# Patient Record
Sex: FEMALE | Race: WHITE | Employment: OTHER | URBAN - NONMETROPOLITAN AREA
[De-identification: names, ages, dates, MRNs, and addresses within clinical notes are randomized per-mention and may not be internally consistent; named-entity substitution may affect disease eponyms.]

---

## 2017-08-05 ENCOUNTER — HOSPITAL ENCOUNTER (EMERGENCY)
Age: 67
Discharge: HOME | End: 2017-08-05
Attending: EMERGENCY MEDICINE
Payer: MEDICARE

## 2017-08-05 ENCOUNTER — APPOINTMENT (OUTPATIENT)
Dept: CT IMAGING | Age: 67
End: 2017-08-05
Payer: MEDICARE

## 2017-08-05 ENCOUNTER — APPOINTMENT (OUTPATIENT)
Dept: GENERAL RADIOLOGY | Age: 67
End: 2017-08-05
Payer: MEDICARE

## 2017-08-05 VITALS
RESPIRATION RATE: 20 BRPM | WEIGHT: 271 LBS | DIASTOLIC BLOOD PRESSURE: 78 MMHG | OXYGEN SATURATION: 93 % | HEART RATE: 83 BPM | TEMPERATURE: 98.5 F | SYSTOLIC BLOOD PRESSURE: 167 MMHG | HEIGHT: 60 IN | BODY MASS INDEX: 53.2 KG/M2

## 2017-08-05 DIAGNOSIS — N10 ACUTE PYELONEPHRITIS: Primary | ICD-10-CM

## 2017-08-05 LAB
-: ABNORMAL
ABSOLUTE EOS #: 0.1 K/UL (ref 0–0.4)
ABSOLUTE LYMPH #: 2 K/UL (ref 1–4.8)
ABSOLUTE MONO #: 0.4 K/UL (ref 0–1)
ALBUMIN SERPL-MCNC: 3.8 G/DL (ref 3.5–5.2)
ALBUMIN/GLOBULIN RATIO: 1 (ref 1–2.5)
ALP BLD-CCNC: 162 U/L (ref 35–104)
ALT SERPL-CCNC: 32 U/L (ref 5–33)
AMORPHOUS: ABNORMAL
ANION GAP SERPL CALCULATED.3IONS-SCNC: 16 MMOL/L (ref 9–17)
AST SERPL-CCNC: 29 U/L
BACTERIA: ABNORMAL
BASOPHILS # BLD: 1 %
BASOPHILS ABSOLUTE: 0.1 K/UL (ref 0–0.2)
BILIRUB SERPL-MCNC: 0.38 MG/DL (ref 0.3–1.2)
BILIRUBIN URINE: NEGATIVE
BUN BLDV-MCNC: 25 MG/DL (ref 8–23)
BUN/CREAT BLD: 28 (ref 9–20)
CALCIUM SERPL-MCNC: 8.9 MG/DL (ref 8.6–10.4)
CASTS UA: ABNORMAL /LPF
CHLORIDE BLD-SCNC: 94 MMOL/L (ref 98–107)
CO2: 23 MMOL/L (ref 20–31)
COLOR: YELLOW
COMMENT UA: ABNORMAL
CREAT SERPL-MCNC: 0.9 MG/DL (ref 0.5–0.9)
CRYSTALS, UA: ABNORMAL /HPF
DIFFERENTIAL TYPE: ABNORMAL
EOSINOPHILS RELATIVE PERCENT: 2 %
EPITHELIAL CELLS UA: ABNORMAL /HPF (ref 0–25)
GFR AFRICAN AMERICAN: >60 ML/MIN
GFR NON-AFRICAN AMERICAN: >60 ML/MIN
GFR SERPL CREATININE-BSD FRML MDRD: ABNORMAL ML/MIN/{1.73_M2}
GFR SERPL CREATININE-BSD FRML MDRD: ABNORMAL ML/MIN/{1.73_M2}
GLUCOSE BLD-MCNC: 288 MG/DL (ref 70–99)
GLUCOSE URINE: ABNORMAL
HCT VFR BLD CALC: 35.2 % (ref 36–46)
HEMOGLOBIN: 11.9 G/DL (ref 12–16)
KETONES, URINE: NEGATIVE
LEUKOCYTE ESTERASE, URINE: ABNORMAL
LIPASE: 55 U/L (ref 13–60)
LYMPHOCYTES # BLD: 35 %
MCH RBC QN AUTO: 27.7 PG (ref 26–34)
MCHC RBC AUTO-ENTMCNC: 33.8 G/DL (ref 31–37)
MCV RBC AUTO: 82 FL (ref 80–100)
MONOCYTES # BLD: 7 %
MUCUS: ABNORMAL
NITRITE, URINE: POSITIVE
OTHER OBSERVATIONS UA: ABNORMAL
PDW BLD-RTO: 15.9 % (ref 12.1–15.2)
PH UA: 5.5 (ref 5–9)
PLATELET # BLD: 141 K/UL (ref 140–450)
PLATELET ESTIMATE: ABNORMAL
PMV BLD AUTO: 9.7 FL (ref 6–12)
POTASSIUM SERPL-SCNC: 3.9 MMOL/L (ref 3.7–5.3)
PROTEIN UA: NEGATIVE
RBC # BLD: 4.29 M/UL (ref 4–5.2)
RBC # BLD: ABNORMAL 10*6/UL
RBC UA: ABNORMAL /HPF (ref 0–2)
RENAL EPITHELIAL, UA: ABNORMAL /HPF
SEG NEUTROPHILS: 55 %
SEGMENTED NEUTROPHILS ABSOLUTE COUNT: 3.2 K/UL (ref 1.8–7.7)
SODIUM BLD-SCNC: 133 MMOL/L (ref 135–144)
SPECIFIC GRAVITY UA: 1.01 (ref 1.01–1.02)
TOTAL PROTEIN: 7.7 G/DL (ref 6.4–8.3)
TRICHOMONAS: ABNORMAL
TURBIDITY: CLEAR
URINE HGB: ABNORMAL
UROBILINOGEN, URINE: NORMAL
WBC # BLD: 5.8 K/UL (ref 3.5–11)
WBC # BLD: ABNORMAL 10*3/UL
WBC UA: ABNORMAL /HPF (ref 0–5)
YEAST: ABNORMAL

## 2017-08-05 PROCEDURE — 99284 EMERGENCY DEPT VISIT MOD MDM: CPT

## 2017-08-05 PROCEDURE — 96365 THER/PROPH/DIAG IV INF INIT: CPT

## 2017-08-05 PROCEDURE — 6360000002 HC RX W HCPCS: Performed by: EMERGENCY MEDICINE

## 2017-08-05 PROCEDURE — 80053 COMPREHEN METABOLIC PANEL: CPT

## 2017-08-05 PROCEDURE — 71010 XR CHEST PORTABLE: CPT

## 2017-08-05 PROCEDURE — 87086 URINE CULTURE/COLONY COUNT: CPT

## 2017-08-05 PROCEDURE — 94761 N-INVAS EAR/PLS OXIMETRY MLT: CPT

## 2017-08-05 PROCEDURE — 81001 URINALYSIS AUTO W/SCOPE: CPT

## 2017-08-05 PROCEDURE — 2580000003 HC RX 258: Performed by: EMERGENCY MEDICINE

## 2017-08-05 PROCEDURE — 74176 CT ABD & PELVIS W/O CONTRAST: CPT

## 2017-08-05 PROCEDURE — 36415 COLL VENOUS BLD VENIPUNCTURE: CPT

## 2017-08-05 PROCEDURE — 87186 SC STD MICRODIL/AGAR DIL: CPT

## 2017-08-05 PROCEDURE — 87077 CULTURE AEROBIC IDENTIFY: CPT

## 2017-08-05 PROCEDURE — 85025 COMPLETE CBC W/AUTO DIFF WBC: CPT

## 2017-08-05 PROCEDURE — 83690 ASSAY OF LIPASE: CPT

## 2017-08-05 PROCEDURE — 96375 TX/PRO/DX INJ NEW DRUG ADDON: CPT

## 2017-08-05 PROCEDURE — 93005 ELECTROCARDIOGRAM TRACING: CPT

## 2017-08-05 RX ORDER — KETOROLAC TROMETHAMINE 15 MG/ML
15 INJECTION, SOLUTION INTRAMUSCULAR; INTRAVENOUS ONCE
Status: COMPLETED | OUTPATIENT
Start: 2017-08-05 | End: 2017-08-05

## 2017-08-05 RX ORDER — LOSARTAN POTASSIUM 100 MG/1
100 TABLET ORAL DAILY
Status: ON HOLD | COMMUNITY
End: 2021-11-04 | Stop reason: HOSPADM

## 2017-08-05 RX ORDER — FENOFIBRATE 160 MG/1
160 TABLET ORAL DAILY
COMMUNITY

## 2017-08-05 RX ORDER — OMEPRAZOLE 20 MG/1
20 CAPSULE, DELAYED RELEASE ORAL DAILY
COMMUNITY

## 2017-08-05 RX ORDER — INSULIN GLARGINE 100 [IU]/ML
30 INJECTION, SOLUTION SUBCUTANEOUS NIGHTLY
COMMUNITY

## 2017-08-05 RX ORDER — SULFAMETHOXAZOLE AND TRIMETHOPRIM 800; 160 MG/1; MG/1
1 TABLET ORAL 2 TIMES DAILY
Qty: 10 TABLET | Refills: 0 | Status: SHIPPED | OUTPATIENT
Start: 2017-08-05 | End: 2017-08-15

## 2017-08-05 RX ORDER — CLOPIDOGREL BISULFATE 75 MG/1
75 TABLET ORAL DAILY
COMMUNITY

## 2017-08-05 RX ADMIN — CEFTRIAXONE 1 G: 1 INJECTION, POWDER, FOR SOLUTION INTRAMUSCULAR; INTRAVENOUS at 02:09

## 2017-08-05 RX ADMIN — KETOROLAC TROMETHAMINE 15 MG: 15 INJECTION, SOLUTION INTRAMUSCULAR; INTRAVENOUS at 03:23

## 2017-08-05 ASSESSMENT — ENCOUNTER SYMPTOMS
FACIAL SWELLING: 0
ABDOMINAL PAIN: 1
WHEEZING: 0
EYE PAIN: 0
BLOOD IN STOOL: 0
EYE REDNESS: 0
RESPIRATORY NEGATIVE: 1
SINUS PRESSURE: 0
CONSTIPATION: 0
VOMITING: 0
EYES NEGATIVE: 1
DIARRHEA: 1
COUGH: 0
SHORTNESS OF BREATH: 0
NAUSEA: 1
CHEST TIGHTNESS: 0

## 2017-08-05 ASSESSMENT — PAIN DESCRIPTION - ORIENTATION: ORIENTATION: RIGHT

## 2017-08-05 ASSESSMENT — PAIN DESCRIPTION - LOCATION: LOCATION: FLANK

## 2017-08-05 ASSESSMENT — PAIN SCALES - GENERAL: PAINLEVEL_OUTOF10: 9

## 2017-08-05 ASSESSMENT — PAIN DESCRIPTION - DESCRIPTORS: DESCRIPTORS: ACHING

## 2017-08-05 ASSESSMENT — PAIN DESCRIPTION - PAIN TYPE: TYPE: ACUTE PAIN

## 2017-08-09 LAB
CULTURE: ABNORMAL
EKG ATRIAL RATE: 84 BPM
EKG P AXIS: 44 DEGREES
EKG P-R INTERVAL: 166 MS
EKG Q-T INTERVAL: 408 MS
EKG QRS DURATION: 90 MS
EKG QTC CALCULATION (BAZETT): 482 MS
EKG R AXIS: -10 DEGREES
EKG T AXIS: 75 DEGREES
EKG VENTRICULAR RATE: 84 BPM
Lab: ABNORMAL
Lab: ABNORMAL
ORGANISM: ABNORMAL
ORGANISM: ABNORMAL
SPECIMEN DESCRIPTION: ABNORMAL
SPECIMEN DESCRIPTION: ABNORMAL
STATUS: ABNORMAL

## 2021-10-20 PROBLEM — M00.9 SEPTIC ARTHRITIS OF KNEE, BILATERAL (HCC): Status: ACTIVE | Noted: 2021-10-20

## 2021-10-21 ENCOUNTER — APPOINTMENT (OUTPATIENT)
Dept: GENERAL RADIOLOGY | Age: 71
DRG: 464 | End: 2021-10-21
Attending: STUDENT IN AN ORGANIZED HEALTH CARE EDUCATION/TRAINING PROGRAM
Payer: COMMERCIAL

## 2021-10-21 ENCOUNTER — HOSPITAL ENCOUNTER (INPATIENT)
Age: 71
LOS: 14 days | Discharge: HOME | DRG: 464 | End: 2021-11-04
Attending: STUDENT IN AN ORGANIZED HEALTH CARE EDUCATION/TRAINING PROGRAM | Admitting: INTERNAL MEDICINE
Payer: COMMERCIAL

## 2021-10-21 DIAGNOSIS — M00.062 STAPHYLOCOCCAL ARTHRITIS OF LEFT KNEE (HCC): Primary | ICD-10-CM

## 2021-10-21 LAB
ABSOLUTE EOS #: 0.22 K/UL (ref 0–0.44)
ABSOLUTE IMMATURE GRANULOCYTE: 0.25 K/UL (ref 0–0.3)
ABSOLUTE LYMPH #: 1.32 K/UL (ref 1.1–3.7)
ABSOLUTE MONO #: 0.81 K/UL (ref 0.1–1.2)
ANION GAP SERPL CALCULATED.3IONS-SCNC: 15 MMOL/L (ref 9–17)
BASOPHILS # BLD: 1 % (ref 0–2)
BASOPHILS ABSOLUTE: 0.06 K/UL (ref 0–0.2)
BUN BLDV-MCNC: 40 MG/DL (ref 8–23)
BUN/CREAT BLD: ABNORMAL (ref 9–20)
CALCIUM SERPL-MCNC: 8.3 MG/DL (ref 8.6–10.4)
CHLORIDE BLD-SCNC: 100 MMOL/L (ref 98–107)
CO2: 16 MMOL/L (ref 20–31)
CREAT SERPL-MCNC: 1.57 MG/DL (ref 0.5–0.9)
DIFFERENTIAL TYPE: ABNORMAL
EOSINOPHILS RELATIVE PERCENT: 2 % (ref 1–4)
GFR AFRICAN AMERICAN: 39 ML/MIN
GFR NON-AFRICAN AMERICAN: 33 ML/MIN
GFR SERPL CREATININE-BSD FRML MDRD: ABNORMAL ML/MIN/{1.73_M2}
GFR SERPL CREATININE-BSD FRML MDRD: ABNORMAL ML/MIN/{1.73_M2}
GLUCOSE BLD-MCNC: 115 MG/DL (ref 70–99)
HCT VFR BLD CALC: 28.9 % (ref 36.3–47.1)
HEMOGLOBIN: 8.9 G/DL (ref 11.9–15.1)
IMMATURE GRANULOCYTES: 2 %
LYMPHOCYTES # BLD: 13 % (ref 24–43)
MCH RBC QN AUTO: 27 PG (ref 25.2–33.5)
MCHC RBC AUTO-ENTMCNC: 30.8 G/DL (ref 28.4–34.8)
MCV RBC AUTO: 87.6 FL (ref 82.6–102.9)
MONOCYTES # BLD: 8 % (ref 3–12)
NRBC AUTOMATED: 0 PER 100 WBC
PDW BLD-RTO: 14.8 % (ref 11.8–14.4)
PLATELET # BLD: 207 K/UL (ref 138–453)
PLATELET ESTIMATE: ABNORMAL
PMV BLD AUTO: 10.9 FL (ref 8.1–13.5)
POTASSIUM SERPL-SCNC: 4.7 MMOL/L (ref 3.7–5.3)
RBC # BLD: 3.3 M/UL (ref 3.95–5.11)
RBC # BLD: ABNORMAL 10*6/UL
SEG NEUTROPHILS: 74 % (ref 36–65)
SEGMENTED NEUTROPHILS ABSOLUTE COUNT: 7.75 K/UL (ref 1.5–8.1)
SODIUM BLD-SCNC: 131 MMOL/L (ref 135–144)
WBC # BLD: 10.4 K/UL (ref 3.5–11.3)
WBC # BLD: ABNORMAL 10*3/UL

## 2021-10-21 PROCEDURE — 36415 COLL VENOUS BLD VENIPUNCTURE: CPT

## 2021-10-21 PROCEDURE — 73562 X-RAY EXAM OF KNEE 3: CPT

## 2021-10-21 PROCEDURE — 85025 COMPLETE CBC W/AUTO DIFF WBC: CPT

## 2021-10-21 PROCEDURE — 71045 X-RAY EXAM CHEST 1 VIEW: CPT

## 2021-10-21 PROCEDURE — 87040 BLOOD CULTURE FOR BACTERIA: CPT

## 2021-10-21 PROCEDURE — 80048 BASIC METABOLIC PNL TOTAL CA: CPT

## 2021-10-21 PROCEDURE — 1200000000 HC SEMI PRIVATE

## 2021-10-21 RX ORDER — ONDANSETRON 2 MG/ML
4 INJECTION INTRAMUSCULAR; INTRAVENOUS EVERY 6 HOURS PRN
Status: DISCONTINUED | OUTPATIENT
Start: 2021-10-21 | End: 2021-11-04 | Stop reason: HOSPADM

## 2021-10-21 RX ORDER — HEPARIN SODIUM 5000 [USP'U]/ML
5000 INJECTION, SOLUTION INTRAVENOUS; SUBCUTANEOUS EVERY 8 HOURS SCHEDULED
Status: DISCONTINUED | OUTPATIENT
Start: 2021-10-21 | End: 2021-10-26

## 2021-10-21 RX ORDER — ACETAMINOPHEN 325 MG/1
650 TABLET ORAL EVERY 6 HOURS PRN
Status: DISCONTINUED | OUTPATIENT
Start: 2021-10-21 | End: 2021-11-04 | Stop reason: HOSPADM

## 2021-10-21 RX ORDER — SODIUM CHLORIDE 9 MG/ML
25 INJECTION, SOLUTION INTRAVENOUS PRN
Status: DISCONTINUED | OUTPATIENT
Start: 2021-10-21 | End: 2021-10-29

## 2021-10-21 RX ORDER — SODIUM CHLORIDE 0.9 % (FLUSH) 0.9 %
5-40 SYRINGE (ML) INJECTION PRN
Status: DISCONTINUED | OUTPATIENT
Start: 2021-10-21 | End: 2021-10-29

## 2021-10-21 RX ORDER — SODIUM CHLORIDE 0.9 % (FLUSH) 0.9 %
5-40 SYRINGE (ML) INJECTION EVERY 12 HOURS SCHEDULED
Status: DISCONTINUED | OUTPATIENT
Start: 2021-10-21 | End: 2021-10-27

## 2021-10-21 RX ORDER — ACETAMINOPHEN 650 MG/1
650 SUPPOSITORY RECTAL EVERY 6 HOURS PRN
Status: DISCONTINUED | OUTPATIENT
Start: 2021-10-21 | End: 2021-11-04 | Stop reason: HOSPADM

## 2021-10-21 RX ORDER — MORPHINE SULFATE 2 MG/ML
2 INJECTION, SOLUTION INTRAMUSCULAR; INTRAVENOUS ONCE
Status: DISCONTINUED | OUTPATIENT
Start: 2021-10-21 | End: 2021-10-21

## 2021-10-21 RX ORDER — ONDANSETRON 4 MG/1
4 TABLET, ORALLY DISINTEGRATING ORAL EVERY 8 HOURS PRN
Status: DISCONTINUED | OUTPATIENT
Start: 2021-10-21 | End: 2021-11-04 | Stop reason: HOSPADM

## 2021-10-21 RX ORDER — OXYCODONE HYDROCHLORIDE AND ACETAMINOPHEN 5; 325 MG/1; MG/1
1 TABLET ORAL EVERY 4 HOURS PRN
Status: DISCONTINUED | OUTPATIENT
Start: 2021-10-21 | End: 2021-10-28

## 2021-10-21 ASSESSMENT — PAIN SCALES - GENERAL: PAINLEVEL_OUTOF10: 10

## 2021-10-22 ENCOUNTER — APPOINTMENT (OUTPATIENT)
Dept: GENERAL RADIOLOGY | Age: 71
DRG: 464 | End: 2021-10-22
Attending: STUDENT IN AN ORGANIZED HEALTH CARE EDUCATION/TRAINING PROGRAM
Payer: COMMERCIAL

## 2021-10-22 PROBLEM — Z99.89 OSA ON CPAP: Status: ACTIVE | Noted: 2021-10-22

## 2021-10-22 PROBLEM — E11.9 TYPE 2 DIABETES MELLITUS WITHOUT COMPLICATION, WITHOUT LONG-TERM CURRENT USE OF INSULIN (HCC): Status: ACTIVE | Noted: 2021-10-22

## 2021-10-22 PROBLEM — G47.33 OSA ON CPAP: Status: ACTIVE | Noted: 2021-10-22

## 2021-10-22 PROBLEM — I25.10 CAD (CORONARY ARTERY DISEASE): Status: ACTIVE | Noted: 2021-10-22

## 2021-10-22 PROBLEM — D64.9 NORMOCYTIC NORMOCHROMIC ANEMIA: Status: ACTIVE | Noted: 2021-10-22

## 2021-10-22 PROBLEM — I10 PRIMARY HYPERTENSION: Status: ACTIVE | Noted: 2021-10-22

## 2021-10-22 PROBLEM — J44.9 COPD (CHRONIC OBSTRUCTIVE PULMONARY DISEASE) (HCC): Status: ACTIVE | Noted: 2021-10-22

## 2021-10-22 PROBLEM — M00.062 STAPHYLOCOCCAL ARTHRITIS OF LEFT KNEE (HCC): Status: ACTIVE | Noted: 2021-10-20

## 2021-10-22 PROBLEM — N17.9 ACUTE KIDNEY INJURY SUPERIMPOSED ON CKD (HCC): Status: ACTIVE | Noted: 2021-10-22

## 2021-10-22 PROBLEM — N18.9 ACUTE KIDNEY INJURY SUPERIMPOSED ON CKD (HCC): Status: ACTIVE | Noted: 2021-10-22

## 2021-10-22 LAB
-: NORMAL
AMORPHOUS: NORMAL
BACTERIA: NORMAL
BILIRUBIN URINE: NEGATIVE
CASTS UA: NORMAL /LPF (ref 0–2)
COLOR: YELLOW
COMMENT UA: ABNORMAL
CRYSTALS, UA: NORMAL /HPF
EKG ATRIAL RATE: 90 BPM
EKG P AXIS: 0 DEGREES
EKG P-R INTERVAL: 176 MS
EKG Q-T INTERVAL: 376 MS
EKG QRS DURATION: 102 MS
EKG QTC CALCULATION (BAZETT): 459 MS
EKG R AXIS: -11 DEGREES
EKG T AXIS: 56 DEGREES
EKG VENTRICULAR RATE: 90 BPM
EPITHELIAL CELLS UA: NORMAL /HPF (ref 0–5)
GLUCOSE BLD-MCNC: 116 MG/DL (ref 65–105)
GLUCOSE BLD-MCNC: 132 MG/DL (ref 65–105)
GLUCOSE BLD-MCNC: 140 MG/DL (ref 65–105)
GLUCOSE BLD-MCNC: 192 MG/DL (ref 65–105)
GLUCOSE BLD-MCNC: 200 MG/DL (ref 65–105)
GLUCOSE URINE: NEGATIVE
HCT VFR BLD CALC: 30.6 % (ref 36.3–47.1)
HEMOGLOBIN: 8.9 G/DL (ref 11.9–15.1)
KETONES, URINE: NEGATIVE
LACTIC ACID, SEPSIS WHOLE BLOOD: 0.7 MMOL/L (ref 0.5–1.9)
LACTIC ACID, SEPSIS: NORMAL MMOL/L (ref 0.5–1.9)
LEUKOCYTE ESTERASE, URINE: ABNORMAL
MCH RBC QN AUTO: 27.1 PG (ref 25.2–33.5)
MCHC RBC AUTO-ENTMCNC: 29.1 G/DL (ref 28.4–34.8)
MCV RBC AUTO: 93 FL (ref 82.6–102.9)
MUCUS: NORMAL
NITRITE, URINE: NEGATIVE
NRBC AUTOMATED: 0 PER 100 WBC
OTHER OBSERVATIONS UA: NORMAL
PDW BLD-RTO: 14.7 % (ref 11.8–14.4)
PH UA: 5 (ref 5–8)
PLATELET # BLD: 216 K/UL (ref 138–453)
PMV BLD AUTO: 10.4 FL (ref 8.1–13.5)
PROTEIN UA: ABNORMAL
RBC # BLD: 3.29 M/UL (ref 3.95–5.11)
RBC UA: NORMAL /HPF (ref 0–2)
RENAL EPITHELIAL, UA: NORMAL /HPF
SARS-COV-2, RAPID: NOT DETECTED
SPECIFIC GRAVITY UA: 1.01 (ref 1–1.03)
SPECIMEN DESCRIPTION: NORMAL
TRICHOMONAS: NORMAL
TURBIDITY: CLEAR
URINE HGB: ABNORMAL
UROBILINOGEN, URINE: NORMAL
WBC # BLD: 8.9 K/UL (ref 3.5–11.3)
WBC UA: NORMAL /HPF (ref 0–5)
YEAST: NORMAL

## 2021-10-22 PROCEDURE — 99231 SBSQ HOSP IP/OBS SF/LOW 25: CPT | Performed by: ORTHOPAEDIC SURGERY

## 2021-10-22 PROCEDURE — 93005 ELECTROCARDIOGRAM TRACING: CPT | Performed by: STUDENT IN AN ORGANIZED HEALTH CARE EDUCATION/TRAINING PROGRAM

## 2021-10-22 PROCEDURE — 82947 ASSAY GLUCOSE BLOOD QUANT: CPT

## 2021-10-22 PROCEDURE — 83605 ASSAY OF LACTIC ACID: CPT

## 2021-10-22 PROCEDURE — 2580000003 HC RX 258: Performed by: STUDENT IN AN ORGANIZED HEALTH CARE EDUCATION/TRAINING PROGRAM

## 2021-10-22 PROCEDURE — 2580000003 HC RX 258: Performed by: NURSE PRACTITIONER

## 2021-10-22 PROCEDURE — 99222 1ST HOSP IP/OBS MODERATE 55: CPT | Performed by: FAMILY MEDICINE

## 2021-10-22 PROCEDURE — 73552 X-RAY EXAM OF FEMUR 2/>: CPT

## 2021-10-22 PROCEDURE — 6360000002 HC RX W HCPCS: Performed by: NURSE PRACTITIONER

## 2021-10-22 PROCEDURE — 85027 COMPLETE CBC AUTOMATED: CPT

## 2021-10-22 PROCEDURE — 81001 URINALYSIS AUTO W/SCOPE: CPT

## 2021-10-22 PROCEDURE — 6370000000 HC RX 637 (ALT 250 FOR IP): Performed by: NURSE PRACTITIONER

## 2021-10-22 PROCEDURE — APPSS45 APP SPLIT SHARED TIME 31-45 MINUTES: Performed by: NURSE PRACTITIONER

## 2021-10-22 PROCEDURE — 36415 COLL VENOUS BLD VENIPUNCTURE: CPT

## 2021-10-22 PROCEDURE — 99222 1ST HOSP IP/OBS MODERATE 55: CPT | Performed by: INTERNAL MEDICINE

## 2021-10-22 PROCEDURE — 6360000002 HC RX W HCPCS: Performed by: STUDENT IN AN ORGANIZED HEALTH CARE EDUCATION/TRAINING PROGRAM

## 2021-10-22 PROCEDURE — 87635 SARS-COV-2 COVID-19 AMP PRB: CPT

## 2021-10-22 PROCEDURE — 1200000000 HC SEMI PRIVATE

## 2021-10-22 PROCEDURE — 73590 X-RAY EXAM OF LOWER LEG: CPT

## 2021-10-22 RX ORDER — GLUCAGON 1 MG/ML
1 KIT INJECTION PRN
Status: DISCONTINUED | OUTPATIENT
Start: 2021-10-22 | End: 2021-11-04 | Stop reason: HOSPADM

## 2021-10-22 RX ORDER — FENOFIBRATE 160 MG/1
160 TABLET ORAL DAILY
Status: DISCONTINUED | OUTPATIENT
Start: 2021-10-22 | End: 2021-10-26

## 2021-10-22 RX ORDER — PANTOPRAZOLE SODIUM 40 MG/1
40 TABLET, DELAYED RELEASE ORAL
Status: DISCONTINUED | OUTPATIENT
Start: 2021-10-22 | End: 2021-11-04 | Stop reason: HOSPADM

## 2021-10-22 RX ORDER — DEXTROSE MONOHYDRATE 25 G/50ML
12.5 INJECTION, SOLUTION INTRAVENOUS PRN
Status: DISCONTINUED | OUTPATIENT
Start: 2021-10-22 | End: 2021-11-04 | Stop reason: HOSPADM

## 2021-10-22 RX ORDER — NICOTINE POLACRILEX 4 MG
15 LOZENGE BUCCAL PRN
Status: DISCONTINUED | OUTPATIENT
Start: 2021-10-22 | End: 2021-11-04 | Stop reason: HOSPADM

## 2021-10-22 RX ORDER — SODIUM CHLORIDE 9 MG/ML
INJECTION, SOLUTION INTRAVENOUS CONTINUOUS
Status: DISCONTINUED | OUTPATIENT
Start: 2021-10-22 | End: 2021-10-25

## 2021-10-22 RX ORDER — FENTANYL CITRATE 50 UG/ML
50 INJECTION, SOLUTION INTRAMUSCULAR; INTRAVENOUS
Status: DISCONTINUED | OUTPATIENT
Start: 2021-10-22 | End: 2021-10-27

## 2021-10-22 RX ORDER — DEXTROSE MONOHYDRATE 50 MG/ML
100 INJECTION, SOLUTION INTRAVENOUS PRN
Status: DISCONTINUED | OUTPATIENT
Start: 2021-10-22 | End: 2021-11-04 | Stop reason: HOSPADM

## 2021-10-22 RX ORDER — LOSARTAN POTASSIUM 50 MG/1
100 TABLET ORAL DAILY
Status: DISCONTINUED | OUTPATIENT
Start: 2021-10-22 | End: 2021-11-04 | Stop reason: HOSPADM

## 2021-10-22 RX ORDER — INSULIN GLARGINE 100 [IU]/ML
50 INJECTION, SOLUTION SUBCUTANEOUS NIGHTLY
Status: DISCONTINUED | OUTPATIENT
Start: 2021-10-22 | End: 2021-10-24

## 2021-10-22 RX ORDER — CLOPIDOGREL BISULFATE 75 MG/1
75 TABLET ORAL DAILY
Status: DISCONTINUED | OUTPATIENT
Start: 2021-10-22 | End: 2021-10-26

## 2021-10-22 RX ORDER — INSULIN GLARGINE 100 [IU]/ML
50 INJECTION, SOLUTION SUBCUTANEOUS NIGHTLY
Status: DISCONTINUED | OUTPATIENT
Start: 2021-10-22 | End: 2021-10-22

## 2021-10-22 RX ADMIN — HEPARIN SODIUM 5000 UNITS: 5000 INJECTION INTRAVENOUS; SUBCUTANEOUS at 23:10

## 2021-10-22 RX ADMIN — SODIUM CHLORIDE: 9 INJECTION, SOLUTION INTRAVENOUS at 05:16

## 2021-10-22 RX ADMIN — INSULIN GLARGINE 50 UNITS: 100 INJECTION, SOLUTION SUBCUTANEOUS at 23:08

## 2021-10-22 RX ADMIN — SODIUM CHLORIDE, PRESERVATIVE FREE 10 ML: 5 INJECTION INTRAVENOUS at 00:11

## 2021-10-22 RX ADMIN — INSULIN LISPRO 1 UNITS: 100 INJECTION, SOLUTION INTRAVENOUS; SUBCUTANEOUS at 23:06

## 2021-10-22 RX ADMIN — OXYCODONE HYDROCHLORIDE AND ACETAMINOPHEN 1 TABLET: 5; 325 TABLET ORAL at 00:11

## 2021-10-22 RX ADMIN — OXYCODONE HYDROCHLORIDE AND ACETAMINOPHEN 1 TABLET: 5; 325 TABLET ORAL at 19:56

## 2021-10-22 RX ADMIN — OXYCODONE HYDROCHLORIDE AND ACETAMINOPHEN 1 TABLET: 5; 325 TABLET ORAL at 05:23

## 2021-10-22 RX ADMIN — OXYCODONE HYDROCHLORIDE AND ACETAMINOPHEN 1 TABLET: 5; 325 TABLET ORAL at 23:59

## 2021-10-22 RX ADMIN — CEFTRIAXONE SODIUM 2000 MG: 2 INJECTION, POWDER, FOR SOLUTION INTRAMUSCULAR; INTRAVENOUS at 08:23

## 2021-10-22 ASSESSMENT — PAIN DESCRIPTION - ONSET: ONSET: ON-GOING

## 2021-10-22 ASSESSMENT — PAIN SCALES - GENERAL
PAINLEVEL_OUTOF10: 10
PAINLEVEL_OUTOF10: 10
PAINLEVEL_OUTOF10: 6
PAINLEVEL_OUTOF10: 8
PAINLEVEL_OUTOF10: 4
PAINLEVEL_OUTOF10: 8
PAINLEVEL_OUTOF10: 5

## 2021-10-22 ASSESSMENT — PAIN DESCRIPTION - PROGRESSION
CLINICAL_PROGRESSION: NOT CHANGED

## 2021-10-22 ASSESSMENT — PAIN DESCRIPTION - LOCATION: LOCATION: LEG

## 2021-10-22 ASSESSMENT — PAIN DESCRIPTION - ORIENTATION: ORIENTATION: LEFT

## 2021-10-22 ASSESSMENT — PAIN DESCRIPTION - FREQUENCY: FREQUENCY: CONTINUOUS

## 2021-10-22 ASSESSMENT — PAIN DESCRIPTION - PAIN TYPE: TYPE: ACUTE PAIN

## 2021-10-22 ASSESSMENT — PAIN DESCRIPTION - DESCRIPTORS: DESCRIPTORS: ACHING;DISCOMFORT

## 2021-10-22 NOTE — CONSULTS
Orthopedic Surgery Consult  (Dr. Yandy Caraballo)    CC/Reason for consult: Concern for left knee periprosthetic infection    HPI:      The patient is a 80 y.o. female with the above complaint being consulted for further evaluation. Patient reports that she had a left total knee arthroplasty performed 14 years ago by Dr. Patrick Dumont.  Patient states she has a history of infection and has had 2 additional surgeries to her left knee performed by Dr. Antonio Lopez at dates she is unsure of. Patient reports that last Friday she had increased pain started on the left knee to the point that she is unable to ambulate. Patient reports that she has always had a stiff knee to the left side but has been able to ambulate. Patient initially showed up to [facility] where an aspiration was performed of the left knee demonstrating 140,000 white blood cell count with a 94% PMNs. Currently, patient denies any fever/chills. In terms of medical history patient has COPD, diabetes, hypertension, hyperlipidemia denies numb/tingling peer otherwise, patient has no orthopedic complaints at this time. Theadora Aures were reviewed. Patient does not complain of any other orthopedic issues. Past Medical History:    Past Medical History:   Diagnosis Date    Arthritis     Diabetes mellitus ([address]     Hyperlipidemia     Hypertension        Past Surgical History:    Past Surgical History:   Procedure Laterality Date    HYSTERECTOMY         Medications Prior to Admission:   Prior to Admission medications    Medication Sig Start Date End Date Taking?  Authorizing Provider   metFORMIN (GLUCOPHAGE) 1000 MG tablet Take 1,000 mg by mouth 2 times daily (with meals)  Patient not taking: Reported on 10/21/2021    Historical Provider, MD   losartan (COZAAR) 100 MG tablet Take 100 mg by mouth daily    Historical Provider, MD   insulin lispro (HUMALOG) 100 UNIT/ML injection vial Inject into the skin 3 times daily (before meals)    Historical Provider, MD   fenofibrate 160 MG tablet Take 160 mg by mouth daily    Historical Provider, MD   omeprazole (PRILOSEC) 20 MG delayed release capsule Take 20 mg by mouth daily    Historical Provider, MD   clopidogrel (PLAVIX) 75 MG tablet Take 75 mg by mouth daily    Historical Provider, MD   insulin glargine (LANTUS) 100 UNIT/ML injection vial Inject 50 Units into the skin nightly    Historical Provider, MD       Allergies:    Bactrim [sulfamethoxazole-trimethoprim], Codeine, and Lisinopril    Social History:   Social History     Socioeconomic History    Marital status:      Spouse name: Not on file    Number of children: Not on file    Years of education: Not on file    Highest education level: Not on file   Occupational History    Not on file   Tobacco Use    Smoking status: Never Smoker   Substance and Sexual Activity    Alcohol use: No    Drug use: No    Sexual activity: Not on file   Other Topics Concern    Not on file   Social History Narrative    Not on file     Social Determinants of Health     Financial Resource Strain:     Difficulty of Paying Living Expenses:    Food Insecurity:     Worried About Running Out of Food in the Last Year:     [address] in the Last Year:    Transportation Needs:     Lack of Transportation (Medical):  Lack of Transportation (Non-Medical):    Physical Activity:     Days of Exercise per Week:     Minutes of Exercise per Session:    Stress:     Feeling of Stress :    Social Connections:     Frequency of Communication with Friends and Family:     Frequency of Social Gatherings with Friends and Family:     Attends [employer] Services:     Active Member of Clubs or Organizations:     Attends Club or Organization Meetings:     Marital Status:    Intimate Partner Violence:     Fear of Current or Ex-Partner:     Emotionally Abused:     Physically Abused:     Sexually Abused:        Family History:  No family history on file.     REVIEW OF SYSTEMS: Constitutional: Negative for fever and chills. Cardiovascular: Negative for chest pain and palpitations. Musculoskeletal: As described in the HPI. Skin: Negative for itching and rash. PHYSICAL EXAM:  Blood pressure (!) 151/70, pulse 92, temperature 98.5 °F (36.9 °C), temperature source Oral, resp. rate 18, height 5' 4\" (1.626 m), weight 293 lb 12.8 oz (133.3 kg). Gen: -Alert, cooperative      Chest: Non labored breathing. Cardiovascular: Regular rate, no dependent edema, distal pulses 2+    Respiratory: Chest symmetric, no accessory muscle use, normal respirations    LLE: Edema noted diffusely along the left leg. Erythema noted along the distal portion of the leg with warmth to touch. Tenderness palpation along the left knee and the leg diffusely. Minimal range of motion to the knee secondary to pain. No open lesion is present. Difficult to palpate for effusion secondary to patient body habitus. Compartments soft. EHL/FHL/TA/GS complex motor intact. Sural, saphenous, superificial/deep peroneal, and plantar nerve distribution SILT. Dorsalis pedis/posterior tibial pulses 2+ with BCR. LABS:  Recent Labs     10/21/21  2310   WBC 10.4   HGB 8.9*   HCT 28.9*      *   K 4.7   BUN 40*   CREATININE 1.57*   GLUCOSE 115*        Radiology:   X-ray of the left knee demonstrating soft tissue swelling and previous total knee arthroplasty hardware placement. No obvious signs of loosening is seen. Impression 80 y.o. female being seen after consultation for the following problems:  1) left knee periprosthetic joint infection    Plan  - Aspiration performed at [address] [place] [address] facility demonstrates a WBC count of 140 2K which is consistent with a left knee periprosthetic infection. Patient require operative intervention in the form of an explant and static spacer placement.  -Follow-up knee aspirate culture performed at [address] [place] [address] which initially shows group B strep.   - Patient is scheduled to go to the OR on 10/25/2021 with Dr. Eloisa Tamez.  - Weight bearing status: Weight-bear as tolerated to left lower extremity  - Informed consent obtained, operative extremity marked  -N.p.o. at midnight on the day of surgery  -Appreciate clearance from primary team  - Pain control per primary  - Ice (20 minutes on and off 1 hour) and elevate above the level of the heart  to reduce swelling and throbbing pain.   - DVT ppx: Please hold the night before surgery  - Please page Ortho with any questions or concerns    Serafin Adhikari DO  Orthopedic Surgery Resident, PGY-3  [address]

## 2021-10-22 NOTE — CONSULTS
[facility] - Initial Consult Note  Today's Date and Time: 10/22/2021, 3:35 PM    Impression :   Lt knee periprosthetic infection with Streptococcus agalactiae  Prior Lt TKA 2007  Prior Lt knee infections x 2  DM 2  Essential HTN  COPD   Leg edema    Recommendations:   Ceftriaxone 2 gm IV q 24 hr  Surgical intervention at the discretion of Orthopedics    Medical Decision Making/Summary/Discussion:10/22/2021       Infection Control Recommendations   [place] Precautions      Antimicrobial Stewardship Recommendations     Simplification of therapy    Coordination of Outpatient Care:   Estimated Length of IV antimicrobials:4 weeks  Patient will need Midline Catheter Insertion: Yes  Patient will need PICC line Insertion:No  Patient will need: Home IV , [place],  SNF,  LTAC: TBD  Patient will need outpatient wound care:Yes    Chief complaint/reason for consultation:   Lt knee pyogenic arthritis      History of Present Illness:   Tahira Villasenor is a 80y.o.-year-old female who was initially admitted on 10/21/2021. Patient seen at the request of . INITIAL HISTORY:    Patient with a Hx of prior Lt TKA  In 2007 at [facility] under Dr Dominguez Moura, She subsequently experienced  Lt knee infections which required additional surgeries x 2. She has underlying DM 2, Essential HTN, CKD,PITA and COPD. [facility] because of acute onset of pain with ambulation. The knee joint was aspirated and grew Strep. Agalactiae. She was started on antibiotics (ceftriaxone) and was awaiting evaluation at tertiary care center because of her Hx of prior surgeries and infections. [facility] refused transfer. She presented to [facility] because of ongoing severe pain, erythema and inflammation. Ortho has evaluated and plan intervention on 10-25-21.     Labs, X rays reviewed: 10/22/2021    BUN: 40  Cr: 1.57    WBC: 10.4  Hb:8.9  Plat: 207    Cultures:  Urine:    Blood:    Sputum :    Wound:  Knee aspirate: Strep agalactiae        Discussed with patient, RN, IM. I have personally reviewed the past medical history, past surgical history, medications, social history, and family history, and I have updated the database accordingly. Past Medical History:     Past Medical History:   Diagnosis Date    Arthritis     Diabetes mellitus ([facility] [address]     Hyperlipidemia     Hypertension        Past Surgical  History:     Past Surgical History:   Procedure Laterality Date    HYSTERECTOMY         Medications:      [Held by provider] clopidogrel  75 mg Oral Daily    [Held by provider] fenofibrate  160 mg Oral Daily    [Held by provider] losartan  100 mg Oral Daily    [Held by provider] pantoprazole  40 mg Oral QAM AC    [Held by provider] insulin glargine  50 Units SubCUTAneous Nightly    insulin lispro  0-12 Units SubCUTAneous TID WC    cefTRIAXone (ROCEPHIN) IV  2,000 mg IntraVENous Q24H    ceFAZolin  2,000 mg IntraVENous On Call to OR    sodium chloride flush  5-40 mL IntraVENous 2 times per day    heparin (porcine)  5,000 Units SubCUTAneous 3 times per day       Social History:     Social History     Socioeconomic History    Marital status:      Spouse name: Not on file    Number of children: Not on file    Years of education: Not on file    Highest education level: Not on file   Occupational History    Not on file   Tobacco Use    Smoking status: Never Smoker   Substance and Sexual Activity    Alcohol use: No    Drug use: No    Sexual activity: Not on file   Other Topics Concern    Not on file   Social History Narrative    Not on file     Social Determinants of Health     Financial Resource Strain:     Difficulty of Paying Living Expenses:    Food Insecurity:     Worried About Running Out of Food in the Last Year:     [address] in the Last Year:    Transportation Needs:     Lack of Transportation (Medical):      Lack of Transportation (Non-Medical):    Physical Activity:     Days of Exercise per Week:     Minutes of Exercise per Session:    Stress:     Feeling of Stress :    Social Connections:     Frequency of Communication with Friends and Family:     Frequency of Social Gatherings with Friends and Family:     Attends [employer] Services:     Active Member of Clubs or Organizations:     Attends Club or Organization Meetings:     Marital Status:    Intimate Partner Violence:     Fear of Current or Ex-Partner:     Emotionally Abused:     Physically Abused:     Sexually Abused:        Family History:   No family history on file. Allergies:   Bactrim [sulfamethoxazole-trimethoprim], Codeine, and Lisinopril     Review of Systems:   Constitutional: No fevers or chills. No systemic complaints  Head: No headaches  Eyes: No double vision or blurry vision. No conjunctival inflammation. ENT: No sore throat or runny nose. . No hearing loss, tinnitus or vertigo. Cardiovascular: No chest pain or palpitations. No shortness of breath. No FRANCES  Lung: No shortness of breath or cough. No sputum production  Abdomen: No nausea, vomiting, diarrhea, or abdominal pain. Maria De Jesus Fanning No cramps. Genitourinary: No increased urinary frequency, or dysuria. No hematuria. No suprapubic or CVA pain  Musculoskeletal: No muscle aches or pains. No joint effusions, swelling or deformities. Lt knee effusion  Hematologic: No bleeding or bruising. Neurologic: No headache, weakness, numbness, or tingling. Integument: No rash, no ulcers. Lt knee erythema  Psychiatric: No depression. Endocrine: No polyuria, no polydipsia, no polyphagia. Physical Examination :   No data found. General Appearance: Awake, alert, and in no apparent distress  Head:  Normocephalic, no trauma  Eyes: Pupils equal, round, reactive to light and accommodation; extraocular movements intact; sclera anicteric; conjunctivae pink. No embolic phenomena.   ENT: Oropharynx clear, without erythema, exudate, or thrush. No tenderness of sinuses. Mouth/throat: mucosa pink and moist. No lesions. Dentition in good repair. Neck:Supple, without lymphadenopathy. Thyroid normal, No bruits. Pulmonary/Chest: Clear to auscultation, without wheezes, rales, or rhonchi. No dullness to percussion. Cardiovascular: Regular rate and rhythm without murmurs, rubs, or gallops. Abdomen: Soft, non tender. Bowel sounds normal. No organomegaly  All four Extremities: No cyanosis, clubbing, edema. Lt knee effusion and erythema. Neurologic: No gross sensory or motor deficits. Skin: Warm and dry with good turgor. No signs of peripheral arterial or venous insufficiency. No ulcerations. No open wounds. Medical Decision Making -Laboratory:   I have independently reviewed/ordered the following labs:    CBC with Differential:   Recent Labs     10/21/21  2310   WBC 10.4   HGB 8.9*   HCT 28.9*      LYMPHOPCT 13*   MONOPCT 8     BMP:   Recent Labs     10/21/21  2310   *   K 4.7      CO2 16*   BUN 40*   CREATININE 1.57*     Hepatic Function Panel: No results for input(s): PROT, LABALBU, BILIDIR, IBILI, BILITOT, ALKPHOS, ALT, AST in the last 72 hours. No results for input(s): RPR in the last 72 hours. No results for input(s): HIV in the last 72 hours. No results for input(s): BC in the last 72 hours. Lab Results   Component Value Date    MUCUS NOT REPORTED 10/22/2021    RBC 3.30 10/21/2021    TRICHOMONAS NOT REPORTED 10/22/2021    WBC 10.4 10/21/2021    YEAST NOT REPORTED 10/22/2021    TURBIDITY Clear 10/22/2021     Lab Results   Component Value Date    CREATININE 1.57 10/21/2021    GLUCOSE 115 10/21/2021       Medical Decision Making-Imaging:     EXAMINATION:   2 X-RAY VIEWS OF THE LEFT TIBIA AND FIBULA; 2 X-RAY VIEWS OF THE LEFT FEMUR.    RULER WAS USED.       10/22/2021 9:46 am       COMPARISON:   Left knee radiographs dated 10/21/2021       HISTORY:   ORDERING SYSTEM PROVIDED HISTORY: leg length eval TECHNOLOGIST PROVIDED HISTORY:   leg length eval   Reason for Exam: leg length eval; Best possible images at this time due to pt   condition . -JEK/GW       FINDINGS:   Left femur: Left femoral head is not visualized due to overlying soft tissue   attenuation.  The superior tip of the greater trochanter is used as a   landmark.  Femoral length from the superior tip of the greater trochanter to   the medial femoral condyle is approximately 39.2 cm.       Left total knee arthroplasty is in place.  No acute fracture of the left   femur.  No evidence of hardware loosening.       Left leg: Distance from the medial femoral condyle to the mid tibial plafond   is 36 cm, which includes the polyethylene liner in the knee arthroplasty. (Total leg length of approximately 75.2 cm from superior tip of greater   trochanter to mid tibial plafond).  The liner measures up to 2 cm in   thickness.       No acute fracture involving the tibia or fibula.  No periprosthetic lucency   in the proximal tibia.  Ankle mortise alignment is preserved.  Scattered   dystrophic calcifications in the leg.           Impression   1.  No acute osseous abnormality in the left femur or tibia/fibula.       2.  Left knee arthroplasty in place without evidence for hardware loosening.       3.  Leg length measurements provided above.  Please note limitations and   landmarks used.         CT extremity lower left with contrast    Result Date: 10/18/2021  History: Acute left lower leg and ankle redness Exam/Technique: Thin axial images through the left lower extremity were obtained from the superior aspect of the acetabulum to the left foot following the intravenous demonstration of 100 mL Omnipaque 300. Study was supplemented by sagittal and coronal reconstructed images. Comparison: None Findings: There is a left knee arthroplasty in place. Full evaluation of the area of the knee is compromised by extensive metallic artifact.  There is a joint effusion of the left knee with fluid seen in the supra patellar bursa. There is no evidence for an acute osseous abnormality. No lytic or blastic processes are seen. No evidence of osteolysis demonstrated. No soft tissue masses or fluid collections are identified. IMPRESSION: 1. Left knee joint effusion with a left knee arthroplasty in place. 2. Otherwise normal CT of the left lower extremity. Workstation:[id number] Finalized by Hernán Velasco MD on 10/18/2021     Medical Decision Valtff-Gthohaxx-Kivqu:   Microbiology Results   Procedure Component Value Units Date/Time   Body fluid culture includes gram stain [[id number]] (Abnormal) Collected: 10/18/21 1210   Specimen: Fluid Updated: 10/19/21 1252   Gram Stain Result WHITE BLOOD CELLS PRESENT   FEW GRAM POSITIVE COCCI   QUANTITY NOT SUFFICIENT TO CONCENTRATE INTERPRET RESULTS WITH CAUTION   A Negative report does not exclude the possibility of infection because results are dependent on adequate specimen collection. Culture RARE STREPTOCOCCUS AGALACTIAE (GROUP B)   SARS COV 2 (COVID-19) [employer] ID Onsite Test [[id number]] Collected: 10/18/21 0253   Specimen: Nasopharynx Updated: 10/18/21 0335   Specimen Naso Pharynx   Sent to Testing to be performed at [address] [place] [address]. COVID-19 [facility] Labs Report to Follow. SARS CoV 2 [[id number]] Collected: 10/18/21 0253   Specimen: Nasopharynx Updated: 10/18/21 0336   First Test? UNKNOWN   Employed in Healthcare? UNKNOWN   Symptoms Defined by CDC? NO   Symptom Onset? U^UNKNOWN   Hospitalized for Covid? UNKNOWN   ICU for Covid? UNKNOWN   Congregate Setting? UNKNOWN   Pregnant?  NO   Specimen Type Naso Pharynx   SARS COV 2 Presumptive Negative     Medical Decision Making-Other:     Note:  Labs, medications, radiologic studies were reviewed with personal review of films  Moderate Large amounts of data were reviewed  Discussed with nursing Staff, Discharge planner  Infection Control and Prevention measures reviewed  All prior entries were reviewed  Administer medications as ordered  Prognosis: Guarded  Discharge planning reviewed  Follow up as outpatient. Thank you for allowing us to participate in the care of this patient. Please call with questions.     Jennifer Cazares MD  Pager: [phone] - Office: [phone]

## 2021-10-22 NOTE — PROGRESS NOTES
Patient/ family concerned that she has not gone to surgery yet. Writer to contact Ortho service and get an updated plan. Message sent to Orthopedic resident, Dr. Sofia Jimenez, to determine if patient is having surgery today as she is NPO and consent was done earlier. 1540  Received reply that no surgery planned today, patient is ok for a diet. Patient and daughter, June given this update. Opal  Message sent to Ortho requesting  Clarification if patient needs a Cardiology consult for surgical clearance as patient and daughter express frustration that they were told that cardiology needed to see her and have not been in.  Awaiting response from Ortho

## 2021-10-22 NOTE — PROGRESS NOTES
Oxygen Weaning Assessment     Starting O2: 3L  Starting SpO2: 98%     Weaned O2: 2L  Weaned SpO2: 97%     Pt placed on 2L after weaning assessment. Pt's home O2 level is 2L.

## 2021-10-22 NOTE — PROGRESS NOTES
Michael Yusuf radiology called about the PICC placement and said PICC needs repositioned. JOSE LUIS Montero notified, told to leave PICC in and have PICC team assess tomorrow.  Currently using the left AC peripheral IV

## 2021-10-22 NOTE — CARE COORDINATION
Case Management Initial Discharge Plan  Pascual Singh,             Met with:patient and dtr at bedside to discuss discharge plans. Information verified: address, contacts, phone number, , insurance Yes  Insurance Provider: [facility] Dual    Emergency Contact/Next of Kin name & number: Tate Montoya [phone]  Who are involved in patient's support system? dtr     PCP: Katherin Aggarwal  Date of last visit: yesterday      Discharge Planning    Living Arrangements:  Alone     Home has 1 stories  5 stairs to climb to get into front door, 0stairs to climb to reach second floor, also has a ramped entrance  Location of bedroom/bathroom in home main level    Patient able to perform ADL's:Independent    Current Services (outpatient & in home)    DME equipment: walker, cane, CPAP, transport chair, electric scooter  DME provider:      Is patient receiving oral anticoagulation therapy? No    If indicated:   Physician managing anticoagulation treatment:    Where does patient obtain lab work for ATC treatment? Potential Assistance Needed:  N/A    Patient agreeable to home care: No  [place] of choice provided:  n/a    Prior SNF/Rehab Placement and Facility:    Agreeable to SNF/Rehab: Yes  [place] of choice provided: yes     Evaluation: no    Expected Discharge date:  10/23/21    Patient expects to be discharged to: If home: is the family and/or caregiver wiling & able to provide support at home? yes  Who will be providing this support? dtr     Follow Up Appointment: Best Day/ Time: Monday AM    Transportation provider: has a ride  Transportation arrangements needed for discharge: No     Readmission Risk              Risk of Unplanned Readmission:  17             Does patient have a readmission risk score greater than 14?: Yes  If yes, follow-up appointment must be made within 7 days of discharge.      Goals of Care:       Educated patient and dtr on transitional options, provided freedom of choice and are agreeable with plan      Discharge Plan: ARU vs SNf, gave lists and will need to get choices          Electronically signed by Rita Gerber RN on 10/22/21 at 9:33 AM EDT

## 2021-10-22 NOTE — PROGRESS NOTES
[facility])  Occupational Therapy Not Seen Note    DATE: 10/22/2021    NAME: Aneta Lemon  MRN: [id number]   : 1950      Patient not seen this date for Occupational Therapy due to:    Surgery/Procedure: \"Patient require operative intervention in the form of an explant and static spacer placement\" scheduled 10/25/21 with Dr. Kalia Choi.     Next Scheduled Treatment: 10/25/21    Electronically signed by Mahin Garza OT on 10/22/2021 at 11:56 AM

## 2021-10-22 NOTE — H&P
[facility]  Office: [address], DO, Bing Monterroso DO, Sabina Valdez, DO, Maribell Lyons, DO, Wendy Oglesby MD, Ania Patel MD, Mark Castillo MD, Ana Funk MD, Arturo Bustamante MD, Nash Mays MD, Monse Michael MD, Sudhakar Caicedo MD, Mat Montes, DO, Shelby Strickland DO, Keira Sosa MD,  Sunitha Royal, DO, Rudolph Burns MD, Raiza Mckinnon MD, Sri Pugh MD, Columba Moctezuma MD, Maine Ryan MD, Abhi Victroia MD, Emeterio Hong [facility], [facility], CNP, Good Cotto, CNP, Thomas Lira, CNS, Aggie Weaver, CNP, Сергей Finley, CNP, Gloria Montano, CNP, Rocio Villalpando, CNP, Amparo Romero, CNP, Lulú Gavin, CNP, Vernon Quesada PA-C, Luiz Edwards, [facility], Delilah Ferreira, CNP, Samantha Casey, CNP, Johan Lynn, CNP, Skyler Mcnair, CNP, Zelda Brown, CNP, Goldy Weber, CNP, Srinath Pride, CNP         [address]    HISTORY AND PHYSICAL EXAMINATION            Date:   10/22/2021  Patient name:  Cameron Romero  Date of admission:  10/21/2021 10:51 PM  MRN:   [id number]  Account:  [de-identified]  YOB: 1950  PCP:    Rianna Diaz  Room:   4488/7674-25  Code Status:    Full Code    Chief Complaint:     Knee pain    History Obtained From:     patient, electronic medical record    History of Present Illness:     Cameron Romero is a 80 y.o. Non- / non  female who presents with No chief complaint on file. and is admitted to the hospital for the management of Staphylococcal arthritis of left knee ([address] This is a 80 yr old female with underlying history of HTN, CAD, CKD, COPD, Hx: DVT, PITA, GERD DMII and HLD who presents initially to outside hospital on 10/18 with acute onset left LE pain and burning sensation to the knee with ambulating/bearing weight. Pain has become so severe she has infact been bed bound 2 days prior to seeking medical attention.  Of note, patient does have history of TKA x2 (last in 2007) to the left knee as well done at [facility]. Imaging of the affected extremity demonstrated: No evidence of DVT, left knee joint effusion with left knee arthroplasty in place. Patient was started on broad spectrum antibiotics and ID was consulted at the outlying facility. Initial cultures of synovial fluid growing strep agalactiae-- titrated to 2g Ceftriaxone QD. Patient has been awaiting transfer to tertiary center for several days for further orthopedic evaluation. On my evaluation, patient is in obvious discomfort and Ortho is at bedside. RLE is erythematous and warm to the touch from the left ankle up to the left knee. Discomfort noted on light palpation. Patient rates pain 10/10 and mild improvement with pain medication. She denies any CP, SOB, ABD pain, n/v/d, dysuria/hematuria, HA/light headedness or fevers. Past Medical History:     Past Medical History:   Diagnosis Date    Arthritis     Diabetes mellitus ([facility]     Hyperlipidemia     Hypertension         Past Surgical History:     Past Surgical History:   Procedure Laterality Date    HYSTERECTOMY          Medications Prior to Admission:     Prior to Admission medications    Medication Sig Start Date End Date Taking?  Authorizing Provider   metFORMIN (GLUCOPHAGE) 1000 MG tablet Take 1,000 mg by mouth 2 times daily (with meals)  Patient not taking: Reported on 10/21/2021    Historical Provider, MD   losartan (COZAAR) 100 MG tablet Take 100 mg by mouth daily    Historical Provider, MD   insulin lispro (HUMALOG) 100 UNIT/ML injection vial Inject into the skin 3 times daily (before meals)    Historical Provider, MD   fenofibrate 160 MG tablet Take 160 mg by mouth daily    Historical Provider, MD   omeprazole (PRILOSEC) 20 MG delayed release capsule Take 20 mg by mouth daily    Historical Provider, MD   clopidogrel (PLAVIX) 75 MG tablet Take 75 mg by mouth daily    Historical Provider, MD   insulin glargine (LANTUS) 100 UNIT/ML injection vial Inject 50 Units into the skin nightly    Historical Provider, MD        Allergies:     Bactrim [sulfamethoxazole-trimethoprim], Codeine, and Lisinopril    Social History:     Tobacco:    reports that she has never smoked. She does not have any smokeless tobacco history on file. Alcohol:      reports no history of alcohol use. Drug Use:  reports no history of drug use. Family History:     No family history on file. Review of Systems:     Positive and Negative as described in HPI. Review of Systems   Unable to perform ROS: Other (patient in significant discomfort)       Physical Exam:   BP (!) 151/70   Pulse 92   Temp 98.5 °F (36.9 °C) (Oral)   Resp 18   Ht 5' 4\" (1.626 m)   Wt 293 lb 8 oz (133.1 kg)   BMI 50.38 kg/m²   Temp (24hrs), Av.5 °F (36.9 °C), Min:98.5 °F (36.9 °C), Max:98.5 °F (36.9 °C)    No results for input(s): POCGLU in the last 72 hours. No intake or output data in the 24 hours ending 10/22/21 0506    Physical Exam  Vitals and nursing note reviewed. Constitutional:       General: She is in acute distress. Appearance: She is obese. She is ill-appearing. She is not toxic-appearing or diaphoretic. HENT:      Head: Normocephalic and atraumatic. Right Ear: External ear normal.      Left Ear: External ear normal.      Nose: Nose normal. No congestion or rhinorrhea. Mouth/Throat:      Mouth: Mucous membranes are dry. Pharynx: Oropharynx is clear. Eyes:      Extraocular Movements: Extraocular movements intact. Conjunctiva/sclera: Conjunctivae normal.      Pupils: Pupils are equal, round, and reactive to light. Cardiovascular:      Rate and Rhythm: Normal rate and regular rhythm. Pulses: Normal pulses. Heart sounds: Normal heart sounds. No murmur heard. No friction rub. No gallop. Pulmonary:      Effort: Pulmonary effort is normal. No respiratory distress. Breath sounds: Normal breath sounds. No wheezing or rales.    Abdominal: General: Bowel sounds are normal. There is no distension. Palpations: Abdomen is soft. There is no mass. Tenderness: There is no abdominal tenderness. Musculoskeletal:         General: Swelling and tenderness present. Cervical back: Normal range of motion and neck supple. No rigidity or tenderness. Right lower leg: Edema present. Left lower leg: Edema present. Skin:     General: Skin is warm. Coloration: Skin is not jaundiced. Findings: Erythema present. No bruising or lesion. Neurological:      General: No focal deficit present. Mental Status: She is alert and oriented to person, place, and time. Mental status is at baseline. Cranial Nerves: No cranial nerve deficit. Sensory: No sensory deficit. Motor: No weakness. Psychiatric:         Mood and Affect: Mood normal.         Behavior: Behavior normal.         Thought Content: Thought content normal.         Judgment: Judgment normal.         Investigations:      Laboratory Testing:  Recent Results (from the past 24 hour(s))   Culture, Blood 1    Collection Time: 10/21/21 11:00 PM    Specimen: Blood   Result Value Ref Range    Specimen Description . BLOOD     Special Requests RT 4ML     Culture NO GROWTH 1 HOUR    CBC auto differential    Collection Time: 10/21/21 11:10 PM   Result Value Ref Range    WBC 10.4 3.5 - 11.3 k/uL    RBC 3.30 (L) 3.95 - 5.11 m/uL    Hemoglobin 8.9 (L) 11.9 - 15.1 g/dL    Hematocrit 28.9 (L) 36.3 - 47.1 %    MCV 87.6 82.6 - 102.9 fL    MCH 27.0 25.2 - 33.5 pg    MCHC 30.8 28.4 - 34.8 g/dL    RDW 14.8 (H) 11.8 - 14.4 %    Platelets 691 901 - 751 k/uL    MPV 10.9 8.1 - 13.5 fL    NRBC Automated 0.0 0.0 per 100 WBC    Differential Type NOT REPORTED     Seg Neutrophils 74 (H) 36 - 65 %    Lymphocytes 13 (L) 24 - 43 %    Monocytes 8 3 - 12 %    Eosinophils % 2 1 - 4 %    Basophils 1 0 - 2 %    Immature Granulocytes 2 (H) 0 %    Segs Absolute 7.75 1.50 - 8.10 k/uL    Absolute Lymph # 1.32 1.10 - 3.70 k/uL    Absolute Mono # 0.81 0.10 - 1.20 k/uL    Absolute Eos # 0.22 0.00 - 0.44 k/uL    Basophils Absolute 0.06 0.00 - 0.20 k/uL    Absolute Immature Granulocyte 0.25 0.00 - 0.30 k/uL    WBC Morphology NOT REPORTED     RBC Morphology ANISOCYTOSIS PRESENT     Platelet Estimate NOT REPORTED    Basic Metabolic Panel w/ Reflex to MG    Collection Time: 10/21/21 11:10 PM   Result Value Ref Range    Glucose 115 (H) 70 - 99 mg/dL    BUN 40 (H) 8 - 23 mg/dL    CREATININE 1.57 (H) 0.50 - 0.90 mg/dL    Bun/Cre Ratio NOT REPORTED 9 - 20    Calcium 8.3 (L) 8.6 - 10.4 mg/dL    Sodium 131 (L) 135 - 144 mmol/L    Potassium 4.7 3.7 - 5.3 mmol/L    Chloride 100 98 - 107 mmol/L    CO2 16 (L) 20 - 31 mmol/L    Anion Gap 15 9 - 17 mmol/L    GFR Non-African American 33 (L) >60 mL/min    GFR  39 (L) >60 mL/min    GFR Comment          GFR Staging NOT REPORTED    EKG 12 lead    Collection Time: 10/22/21  1:35 AM   Result Value Ref Range    Ventricular Rate 90 BPM    Atrial Rate 90 BPM    P-R Interval 176 ms    QRS Duration 102 ms    Q-T Interval 376 ms    QTc Calculation (Bazett) 459 ms    P Axis 0 degrees    R Axis -11 degrees    T Axis 56 degrees   Urinalysis, Routine    Collection Time: 10/22/21  1:54 AM   Result Value Ref Range    Color, UA Yellow Yellow    Turbidity UA Clear Clear    Glucose, Ur NEGATIVE NEGATIVE    Bilirubin Urine NEGATIVE NEGATIVE    Ketones, Urine NEGATIVE NEGATIVE    Specific Gravity, UA 1.013 1.005 - 1.030    Urine Hgb SMALL (A) NEGATIVE    pH, UA 5.0 5.0 - 8.0    Protein, UA 2+ (A) NEGATIVE    Urobilinogen, Urine Normal Normal    Nitrite, Urine NEGATIVE NEGATIVE    Leukocyte Esterase, Urine TRACE (A) NEGATIVE    Urinalysis Comments NOT REPORTED        Imaging/Diagnostics:  XR KNEE LEFT (3 VIEWS)    Result Date: 10/22/2021  1. Soft tissue edema and effusion. 2. No acute osseous abnormality. XR CHEST PORTABLE    Result Date: 10/22/2021  1.  Aberrant positioning of the right upper extremity PICC line. Tip is either within the distal left subclavian vein, or directed into the azygous vein. Repositioning is recommended 2.  Report was marked to be called to a licensed caregiver       Assessment :      Hospital Problems         Last Modified POA    * (Principal) Staphylococcal arthritis of left knee ([address] 10/22/2021 Yes    Type 2 diabetes mellitus without complication, without long-term current use of insulin ([address] 10/22/2021 Yes    PITA on CPAP 10/22/2021 Yes    CAD (coronary artery disease) 10/22/2021 Yes    COPD (chronic obstructive pulmonary disease) ([address] 10/22/2021 Yes    Primary hypertension 10/22/2021 Yes    Acute kidney injury superimposed on CKD ([address] 10/22/2021 Yes    Normocytic normochromic anemia 10/22/2021 Yes          Plan:     Patient status inpatient in the Progressive Unit/Step down    Suspected septic arthritis: Ortho and ID consulted and appreciate further evaluation and recommendations, will resume IV Ceftriaxone 2gm QD as previously ordered otherwise adjustments at ID discretion, prn pain control  DHRUV on CKD: creatinine remains elevated but is down trending, continue gentle IV fluids and follow daily labs, avoid nephrotoxic medications and pharmacy consult for renal dosing of medications  Hyponatremia: check urine na and urine osm  NCNC Anemia: HGb stable, check iron studies and trend hgb with daily cbc  Type II DM: resume home dose lantus at HS and ISS, check glucose ACHS and monitor hypoglycemia protocol  COPD: no evidence of acute exacerbation  HTN: currently controlled, resume antihypertensives  CAD s/p CABG: on plavix, no acute ischemic changes on EKG  Routine labs, home medications resumed but held for NPO status  DVT prophylaxis: SC Heparin  Full Code    Consultations:   IP CONSULT TO SOCIAL WORK  IP CONSULT TO INFECTIOUS DISEASES  IP CONSULT TO ORTHOPEDIC SURGERY    Patient is admitted as inpatient status because of co-morbidities listed above, severity of signs and symptoms as outlined, requirement for current medical therapies and most importantly because of direct risk to patient if care not provided in a hospital setting. Expected length of stay > 48 hours.     DINESH Lara NP  10/22/2021  5:06 AM    Copy sent to Dr. Desiree Mcintosh

## 2021-10-23 LAB
ABSOLUTE EOS #: 0.19 K/UL (ref 0–0.44)
ABSOLUTE IMMATURE GRANULOCYTE: 0.28 K/UL (ref 0–0.3)
ABSOLUTE LYMPH #: 0.99 K/UL (ref 1.1–3.7)
ABSOLUTE MONO #: 0.53 K/UL (ref 0.1–1.2)
ANION GAP SERPL CALCULATED.3IONS-SCNC: 14 MMOL/L (ref 9–17)
BASOPHILS # BLD: 1 % (ref 0–2)
BASOPHILS ABSOLUTE: 0.04 K/UL (ref 0–0.2)
BUN BLDV-MCNC: 33 MG/DL (ref 8–23)
BUN/CREAT BLD: ABNORMAL (ref 9–20)
CALCIUM SERPL-MCNC: 7.9 MG/DL (ref 8.6–10.4)
CHLORIDE BLD-SCNC: 103 MMOL/L (ref 98–107)
CO2: 17 MMOL/L (ref 20–31)
CREAT SERPL-MCNC: 1.15 MG/DL (ref 0.5–0.9)
DIFFERENTIAL TYPE: ABNORMAL
EOSINOPHILS RELATIVE PERCENT: 2 % (ref 1–4)
FERRITIN: 372 UG/L (ref 13–150)
GFR AFRICAN AMERICAN: 57 ML/MIN
GFR NON-AFRICAN AMERICAN: 47 ML/MIN
GFR SERPL CREATININE-BSD FRML MDRD: ABNORMAL ML/MIN/{1.73_M2}
GFR SERPL CREATININE-BSD FRML MDRD: ABNORMAL ML/MIN/{1.73_M2}
GLUCOSE BLD-MCNC: 159 MG/DL (ref 70–99)
GLUCOSE BLD-MCNC: 171 MG/DL (ref 65–105)
GLUCOSE BLD-MCNC: 175 MG/DL (ref 65–105)
GLUCOSE BLD-MCNC: 201 MG/DL (ref 65–105)
GLUCOSE BLD-MCNC: 228 MG/DL (ref 65–105)
GLUCOSE BLD-MCNC: 242 MG/DL (ref 65–105)
HCT VFR BLD CALC: 28.9 % (ref 36.3–47.1)
HEMOGLOBIN: 8.8 G/DL (ref 11.9–15.1)
IMMATURE GRANULOCYTES: 4 %
IRON SATURATION: 9 % (ref 20–55)
IRON: 15 UG/DL (ref 37–145)
LYMPHOCYTES # BLD: 13 % (ref 24–43)
MCH RBC QN AUTO: 28.4 PG (ref 25.2–33.5)
MCHC RBC AUTO-ENTMCNC: 30.4 G/DL (ref 28.4–34.8)
MCV RBC AUTO: 93.2 FL (ref 82.6–102.9)
MONOCYTES # BLD: 7 % (ref 3–12)
NRBC AUTOMATED: 0 PER 100 WBC
OSMOLALITY URINE: 403 MOSM/KG (ref 80–1300)
PDW BLD-RTO: 14.8 % (ref 11.8–14.4)
PLATELET # BLD: 318 K/UL (ref 138–453)
PLATELET ESTIMATE: ABNORMAL
PMV BLD AUTO: 11 FL (ref 8.1–13.5)
POTASSIUM SERPL-SCNC: 4.7 MMOL/L (ref 3.7–5.3)
RBC # BLD: 3.1 M/UL (ref 3.95–5.11)
RBC # BLD: ABNORMAL 10*6/UL
SEG NEUTROPHILS: 73 % (ref 36–65)
SEGMENTED NEUTROPHILS ABSOLUTE COUNT: 5.81 K/UL (ref 1.5–8.1)
SODIUM BLD-SCNC: 134 MMOL/L (ref 135–144)
SODIUM,UR: 63 MMOL/L
TOTAL IRON BINDING CAPACITY: 168 UG/DL (ref 250–450)
UNSATURATED IRON BINDING CAPACITY: 153 UG/DL (ref 112–347)
WBC # BLD: 7.8 K/UL (ref 3.5–11.3)
WBC # BLD: ABNORMAL 10*3/UL

## 2021-10-23 PROCEDURE — 6360000002 HC RX W HCPCS: Performed by: NURSE PRACTITIONER

## 2021-10-23 PROCEDURE — 99232 SBSQ HOSP IP/OBS MODERATE 35: CPT | Performed by: INTERNAL MEDICINE

## 2021-10-23 PROCEDURE — 83540 ASSAY OF IRON: CPT

## 2021-10-23 PROCEDURE — 82947 ASSAY GLUCOSE BLOOD QUANT: CPT

## 2021-10-23 PROCEDURE — 82728 ASSAY OF FERRITIN: CPT

## 2021-10-23 PROCEDURE — 6370000000 HC RX 637 (ALT 250 FOR IP): Performed by: STUDENT IN AN ORGANIZED HEALTH CARE EDUCATION/TRAINING PROGRAM

## 2021-10-23 PROCEDURE — 2580000003 HC RX 258: Performed by: NURSE PRACTITIONER

## 2021-10-23 PROCEDURE — 6370000000 HC RX 637 (ALT 250 FOR IP): Performed by: NURSE PRACTITIONER

## 2021-10-23 PROCEDURE — 83935 ASSAY OF URINE OSMOLALITY: CPT

## 2021-10-23 PROCEDURE — 99232 SBSQ HOSP IP/OBS MODERATE 35: CPT | Performed by: FAMILY MEDICINE

## 2021-10-23 PROCEDURE — 85025 COMPLETE CBC W/AUTO DIFF WBC: CPT

## 2021-10-23 PROCEDURE — 36415 COLL VENOUS BLD VENIPUNCTURE: CPT

## 2021-10-23 PROCEDURE — 1200000000 HC SEMI PRIVATE

## 2021-10-23 PROCEDURE — 83550 IRON BINDING TEST: CPT

## 2021-10-23 PROCEDURE — 6360000002 HC RX W HCPCS: Performed by: STUDENT IN AN ORGANIZED HEALTH CARE EDUCATION/TRAINING PROGRAM

## 2021-10-23 PROCEDURE — 80048 BASIC METABOLIC PNL TOTAL CA: CPT

## 2021-10-23 PROCEDURE — 6370000000 HC RX 637 (ALT 250 FOR IP)

## 2021-10-23 PROCEDURE — 84300 ASSAY OF URINE SODIUM: CPT

## 2021-10-23 RX ORDER — POLYETHYLENE GLYCOL 3350 17 G/17G
POWDER, FOR SOLUTION ORAL
Status: COMPLETED
Start: 2021-10-23 | End: 2021-10-23

## 2021-10-23 RX ADMIN — HEPARIN SODIUM 5000 UNITS: 5000 INJECTION INTRAVENOUS; SUBCUTANEOUS at 22:14

## 2021-10-23 RX ADMIN — OXYCODONE HYDROCHLORIDE AND ACETAMINOPHEN 1 TABLET: 5; 325 TABLET ORAL at 04:40

## 2021-10-23 RX ADMIN — OXYCODONE HYDROCHLORIDE AND ACETAMINOPHEN 1 TABLET: 5; 325 TABLET ORAL at 17:33

## 2021-10-23 RX ADMIN — HEPARIN SODIUM 5000 UNITS: 5000 INJECTION INTRAVENOUS; SUBCUTANEOUS at 13:22

## 2021-10-23 RX ADMIN — MAGNESIUM HYDROXIDE 30 ML: 400 SUSPENSION ORAL at 19:17

## 2021-10-23 RX ADMIN — INSULIN GLARGINE 50 UNITS: 100 INJECTION, SOLUTION SUBCUTANEOUS at 22:14

## 2021-10-23 RX ADMIN — OXYCODONE HYDROCHLORIDE AND ACETAMINOPHEN 1 TABLET: 5; 325 TABLET ORAL at 09:02

## 2021-10-23 RX ADMIN — INSULIN LISPRO 2 UNITS: 100 INJECTION, SOLUTION INTRAVENOUS; SUBCUTANEOUS at 22:16

## 2021-10-23 RX ADMIN — CEFTRIAXONE SODIUM 2000 MG: 2 INJECTION, POWDER, FOR SOLUTION INTRAMUSCULAR; INTRAVENOUS at 09:02

## 2021-10-23 RX ADMIN — POLYETHYLENE GLYCOL 3350 17 G: 17 POWDER, FOR SOLUTION ORAL at 17:33

## 2021-10-23 RX ADMIN — OXYCODONE HYDROCHLORIDE AND ACETAMINOPHEN 1 TABLET: 5; 325 TABLET ORAL at 22:14

## 2021-10-23 RX ADMIN — HEPARIN SODIUM 5000 UNITS: 5000 INJECTION INTRAVENOUS; SUBCUTANEOUS at 06:20

## 2021-10-23 RX ADMIN — ONDANSETRON 4 MG: 2 INJECTION INTRAMUSCULAR; INTRAVENOUS at 19:12

## 2021-10-23 RX ADMIN — OXYCODONE HYDROCHLORIDE AND ACETAMINOPHEN 1 TABLET: 5; 325 TABLET ORAL at 13:21

## 2021-10-23 ASSESSMENT — PAIN SCALES - GENERAL
PAINLEVEL_OUTOF10: 6
PAINLEVEL_OUTOF10: 7
PAINLEVEL_OUTOF10: 3
PAINLEVEL_OUTOF10: 3
PAINLEVEL_OUTOF10: 9
PAINLEVEL_OUTOF10: 9
PAINLEVEL_OUTOF10: 3
PAINLEVEL_OUTOF10: 6
PAINLEVEL_OUTOF10: 6
PAINLEVEL_OUTOF10: 5
PAINLEVEL_OUTOF10: 6

## 2021-10-23 ASSESSMENT — PAIN DESCRIPTION - FREQUENCY: FREQUENCY: CONTINUOUS

## 2021-10-23 ASSESSMENT — PAIN DESCRIPTION - PROGRESSION

## 2021-10-23 ASSESSMENT — PAIN DESCRIPTION - ORIENTATION: ORIENTATION: LEFT

## 2021-10-23 ASSESSMENT — ENCOUNTER SYMPTOMS
EYE PAIN: 0
SHORTNESS OF BREATH: 0
APNEA: 0
ABDOMINAL DISTENTION: 0
COLOR CHANGE: 1

## 2021-10-23 ASSESSMENT — PAIN DESCRIPTION - LOCATION: LOCATION: LEG

## 2021-10-23 ASSESSMENT — PAIN DESCRIPTION - PAIN TYPE: TYPE: ACUTE PAIN

## 2021-10-23 ASSESSMENT — PAIN DESCRIPTION - DESCRIPTORS: DESCRIPTORS: ACHING;DISCOMFORT

## 2021-10-23 ASSESSMENT — PAIN DESCRIPTION - ONSET: ONSET: ON-GOING

## 2021-10-23 NOTE — CARE COORDINATION
Transitional Planning    Spoke to patient about plan for discharge. Okay to send referrals to [facility] and [facility] in [place]. Talked about ARU, patient is not sure she could do 3 hours of therapy per day.  Referrals sent to SNFs

## 2021-10-23 NOTE — PROGRESS NOTES
[facility]  Office: [address], DO, Veronica Castle, DO, Daisy Jeffries, DO, Lesli Montero Blood, DO, Rivka Spangler MD, Hosea De Guzman MD, Toni Hammond MD, Claudean Millard, MD, Sonia Ricks MD, Marcelle Archer MD, Nisha Burgos MD, Artem Segura MD, Emir Alcazar, DO, Sandra Saucedo DO, Lizzie Dixon MD,  Mitch Truong DO, Migel Morales MD, Carissa Sin MD, Janel Villaseñor MD, Deyanira Roberts MD, Eder Chand MD, Precious Montiel MD, Melanie Garcia, [facility], [facility], CNP, Radha Vieyra, CNP, Juventino Contreras, CNS, Bree Katz, CNP, Ondina Hood, CNP, Emma Ahuja, CNP, Mc David, CNP, Mackenzie Pittman, CNP, Alisson Garcia, CNP, Nora Wilson PA-C, Parris Zhou, [facility], Kayy Hubbard, CNP, Moris Blanton, CNP, Brenda Ibrahim CNP, Abdias Louis CNP, Kassandra Cleary CNP, Renuka Wise, [facility], Tim Saint, [address] [place] [address]    Progress Note    10/23/2021    7:03 AM    Name:   Shazia Salas  MRN:     [id number]     Acct:      [de-identified]   Room:   [address] [place] [address] Day:  2  Admit Date:  10/21/2021 10:51 PM    PCP:   Desiree Main  Code Status:  Full Code    Subjective:     C/C: No chief complaint on file. Interval History Status: not changed. Pt was seen and examined this mroning  Complaints of left knee pain   Otherwise resting at this time   No acute events overnight          Review of Systems:     12 point ROS performed and negative for anything other than what was stated in subjective    Medications: Allergies:     Allergies   Allergen Reactions    Bactrim [Sulfamethoxazole-Trimethoprim]     Codeine     Lisinopril Other (See Comments)     cough       Current Meds:   Scheduled Meds:    [Held by provider] clopidogrel  75 mg Oral Daily    [Held by provider] fenofibrate  160 mg Oral Daily    [Held by provider] losartan  100 mg Oral Daily    [Held by provider] pantoprazole  40 mg Oral QAM AC    insulin lispro 0-12 Units SubCUTAneous TID WC    cefTRIAXone (ROCEPHIN) IV  2,000 mg IntraVENous Q24H    insulin lispro  0-6 Units SubCUTAneous Nightly    insulin glargine  50 Units SubCUTAneous Nightly    sodium chloride flush  5-40 mL IntraVENous 2 times per day    heparin (porcine)  5,000 Units SubCUTAneous 3 times per day     Continuous Infusions:    sodium chloride 75 mL/hr at 10/22/21 0516    dextrose      sodium chloride       PRN Meds: fentanNYL, glucose, dextrose, glucagon (rDNA), dextrose, sodium chloride flush, sodium chloride, ondansetron **OR** ondansetron, magnesium hydroxide, acetaminophen **OR** acetaminophen, oxyCODONE-acetaminophen    Data:     Past Medical History:   has a past medical history of Arthritis, Diabetes mellitus ([address] Hyperlipidemia, and Hypertension. Social History:   reports that she has never smoked. She does not have any smokeless tobacco history on file. She reports that she does not drink alcohol and does not use drugs. Family History: No family history on file. Vitals:  BP (!) 157/56   Pulse 82   Temp 98.2 °F (36.8 °C) (Oral)   Resp 18   Ht 5' 4\" (1.626 m)   Wt 296 lb 4.8 oz (134.4 kg)   SpO2 99%   BMI 50.86 kg/m²   Temp (24hrs), Av.5 °F (36.9 °C), Min:98.2 °F (36.8 °C), Max:98.8 °F (37.1 °C)    Recent Labs     10/22/21  1250 10/22/21  1624 10/22/21  2042 10/22/21  2259   POCGLU 140* 132* 200* 192*       I/O (24Hr):   No intake or output data in the 24 hours ending 10/23/21 0703    Labs:  Hematology:  Recent Labs     10/21/21  2310 10/22/21  2228 10/23/21  0504   WBC 10.4 8.9 7.8   RBC 3.30* 3.29* 3.10*   HGB 8.9* 8.9* 8.8*   HCT 28.9* 30.6* 28.9*   MCV 87.6 93.0 93.2   MCH 27.0 27.1 28.4   MCHC 30.8 29.1 30.4   RDW 14.8* 14.7* 14.8*    216 318   MPV 10.9 10.4 11.0     Chemistry:  Recent Labs     10/21/21  2310 10/23/21  0504   * 134*   K 4.7 4.7    103   CO2 16* 17*   GLUCOSE 115* 159*   BUN 40* 33*   CREATININE 1.57* 1.15*   ANIONGAP 15 14 LABGLOM 33* 47*   GFRAA 39* 57*   CALCIUM 8.3* 7.9*     Recent Labs     10/22/21  0631 10/22/21  1250 10/22/21  1624 10/22/21  2042 10/22/21  2259   POCGLU 116* 140* 132* 200* 192*     ABG:No results found for: POCPH, PHART, PH, POCPCO2, WNB1OXW, PCO2, POCPO2, PO2ART, PO2, POCHCO3, GZQ9QLU, HCO3, NBEA, PBEA, BEART, BE, THGBART, THB, XNY7ZCX, SJKS5XXD, L3FPUZTK, O2SAT, FIO2  Lab Results   Component Value Date/Time    SPECIAL RT 4ML 10/21/2021 11:00 PM     Lab Results   Component Value Date/Time    CULTURE NO GROWTH 1 DAY 10/21/2021 11:00 PM       Radiology:  XR FEMUR LEFT (MIN 2 VIEWS)    Result Date: 10/22/2021  1. No acute osseous abnormality in the left femur or tibia/fibula. 2.  Left knee arthroplasty in place without evidence for hardware loosening. 3.  Leg length measurements provided above. Please note limitations and landmarks used. XR KNEE LEFT (3 VIEWS)    Result Date: 10/22/2021  1. Soft tissue edema and effusion. 2. No acute osseous abnormality. XR TIBIA FIBULA LEFT (2 VIEWS)    Result Date: 10/22/2021  1. No acute osseous abnormality in the left femur or tibia/fibula. 2.  Left knee arthroplasty in place without evidence for hardware loosening. 3.  Leg length measurements provided above. Please note limitations and landmarks used. XR CHEST PORTABLE    Result Date: 10/22/2021  1. Aberrant positioning of the right upper extremity PICC line. Tip is either within the distal left subclavian vein, or directed into the azygous vein. Repositioning is recommended 2.  Report was marked to be called to a licensed caregiver       Physical Examination:        General appearance:  alert, cooperative and no distress  Mental Status:  oriented to person, place and time and normal affect  Lungs:  clear to auscultation bilaterally, normal effort  Heart:  regular rate and rhythm, no murmur  Abdomen:  soft, nontender, nondistended, normal bowel sounds  Extremities: left knee edema and TTP  Skin:  no gross lesions, rashes, induration    Assessment:        Hospital Problems         Last Modified POA    * (Principal) Staphylococcal arthritis of left knee ([facility] 10/22/2021 Yes    Type 2 diabetes mellitus without complication, without long-term current use of insulin ([facility] 10/22/2021 Yes    PITA on CPAP 10/22/2021 Yes    CAD (coronary artery disease) 10/22/2021 Yes    COPD (chronic obstructive pulmonary disease) ([facility] 10/22/2021 Yes    Primary hypertension 10/22/2021 Yes    Acute kidney injury superimposed on CKD ([facility] [facility] 10/22/2021 Yes    Normocytic normochromic anemia 10/22/2021 Yes          Plan:        1. Suspected septic arthritis:   - Ortho and ID consulted    - will resume IV Ceftriaxone 2gm QD   - per ortho, Aspiration performed at [address] [place] [address] demonstrates a WBC count of 140 2K which is consistent with a left knee periprosthetic infection.    - Patient require operative intervention in the form of an explant and static spacer placement. - plan for OR on 10/25  - cardiology consulted for cardiac clearance    2. DHRUV on CKD:   - renal function improved  - renally dose all meds  - avoid nephrotoxic agents    3. Hyponatremia:   - improved  - continue to monitor     4. NCNC Anemia:   - HGb stable    5. Type II DM:   - accu checks ac/hs with ISS  - continuie home lantus     6. COPD:  - no evidence of acute exacerbation    7. HTN:   - controlled  - v/s per unit protocol   - continue home anti hypertensives    8. CAD s/p CABG:  - on plavix, no acute ischemic changes on EKG     9.  DVT prophylaxis:   - SC Heparin    Jena Mei MD  10/23/2021  7:03 AM

## 2021-10-23 NOTE — CONSULTS
[facility]  CONSULT NOTE                  Date:   10/23/2021  Patient name: Ge Bartholomew  Date of admission:  10/21/2021 10:51 PM  MRN:   [id number]  YOB: 1950    Reason for Admission: preop risk stratification     CHIEF COMPLAINT:   Left knee periprosthetic infection     History Obtained From:  Patient and chart review     HISTORY OF PRESENT ILLNESS:      30-year-old female with no prior cardiac history, admitted with left periprosthetic knee infection, scheduled for explant and spacer placement on Monday. Cardiology consulted for preoperative stratification. Patient has no prior history of CAD/CHF or arrhythmia however she is on insulin insulin therapy and previous history of TIA. Her functional ability is limited because of COPD. She is oxygen dependent at home. Unable to achieve more than 4 metabolic declines on daily basis. Did not have any cardiac work-up in the recent past.      Past Medical History:   has a past medical history of Arthritis, Diabetes mellitus ([address] Hyperlipidemia, and Hypertension. Past Surgical History:   has a past surgical history that includes Hysterectomy. Home Medications:    Prior to Admission medications    Medication Sig Start Date End Date Taking?  Authorizing Provider   metFORMIN (GLUCOPHAGE) 1000 MG tablet Take 1,000 mg by mouth 2 times daily (with meals)  Patient not taking: Reported on 10/21/2021    Historical Provider, MD   losartan (COZAAR) 100 MG tablet Take 100 mg by mouth daily    Historical Provider, MD   insulin lispro (HUMALOG) 100 UNIT/ML injection vial Inject into the skin 3 times daily (before meals)    Historical Provider, MD   fenofibrate 160 MG tablet Take 160 mg by mouth daily    Historical Provider, MD   omeprazole (PRILOSEC) 20 MG delayed release capsule Take 20 mg by mouth daily    Historical Provider, MD   clopidogrel (PLAVIX) 75 MG tablet Take 75 mg by mouth daily    Historical Provider, MD   insulin glargine (LANTUS) 100 UNIT/ML injection vial Inject 50 Units into the skin nightly    Historical Provider, MD       Allergies:  Bactrim [sulfamethoxazole-trimethoprim], Codeine, and Lisinopril    Social History:   reports that she has never smoked. She does not have any smokeless tobacco history on file. She reports that she does not drink alcohol and does not use drugs. Family History:    for early CAD    REVIEW OF SYSTEMS:    · Constitutional: there has been no unanticipated weight loss. No change in functional capacity. · Eyes: No visual changes or diplopia. · ENT: No Headaches, hearing loss or vertigo. No mouth sores or sore throat. · Cardiovascular: No chest pain, positive for exertional dyspnea, negative for orthopnea, positive for lower extremity edema  · Respiratory: No hx of productive cough, pleuritic chest pain   · Gastrointestinal: No abdominal pain, appetite loss, blood in stools. No change in bowel habits. · Genitourinary: No dysuria, trouble voiding, or hematuria. · Musculoskeletal: Positive for left knee pain  · Integumentary: No rash or pruritis. · Neurological: No headache, weakness, numbness or tingling. No change in gait, balance, coordination. · Psychiatric: No anxiety, or depression. · Endocrine: No temperature intolerance. No excessive thirst, fluid intake, or urination. No tremor. · Hematologic/Lymphatic: No abnormal bruising or bleeding, blood clots or swollen lymph nodes. · Allergic/Immunologic: No nasal congestion or hives. PHYSICAL EXAM:    Physical Examination:    BP (!) 144/97   Pulse 82   Temp 98.1 °F (36.7 °C) (Oral)   Resp 18   Ht 5' 4\" (1.626 m)   Wt 296 lb 4.8 oz (134.4 kg)   SpO2 98%   BMI 50.86 kg/m²    Constitutional and General Appearance: alert, cooperative, in no distress   HEENT: PERRL, no cervical lymphadenopathy.  Normal oral mucosa  Respiratory:  · Normal excursion and expansion without use of accessory muscles  · Resp Auscultation: Good respiratory pressure control      Lucy Gross MD, [facility]

## 2021-10-23 NOTE — PROGRESS NOTES
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  · Left nee cellulitis  · Prior Lt TKA 2007  · Prior Lt knee infections x 2  · DM 2  · Essential HTN  · COPD   · Leg edema    Recommendations:   · Ceftriaxone 2 gm IV q 24 hr  · Surgical intervention at the discretion of Orthopedics- 10/25  · Watch ;left knee redness     Medical Decision Making/Summary/Discussion:10/23/2021     ·   Infection Control Recommendations   · Universal Precautions  ·     Antimicrobial Stewardship Recommendations     · Simplification of therapy    Coordination of Outpatient Care:   · Estimated Length of IV antimicrobials:4 weeks  · Patient will need Midline Catheter Insertion: Yes  · Patient will need PICC line Insertion:No  · Patient will need: Home IV , [place],  SNF,  LTAC: TBD  · Patient will need outpatient wound care:Yes    Chief complaint/reason for consultation:   · Lt knee pyogenic arthritis      History of Present Illness:   Susana Hinojosa is a 80y.o.-year-old female who was initially admitted on 10/21/2021. Patient seen at the request of . INITIAL HISTORY:    Patient with a Hx of prior Lt TKA  In 2007 at [facility] under Dr Oliver Crain, She subsequently experienced  Lt knee infections which required additional surgeries x 2. She has underlying DM 2, Essential HTN, CKD,PITA and COPD. [facility] because of acute onset of pain with ambulation. The knee joint was aspirated and grew Strep. Agalactiae. She was started on antibiotics (ceftriaxone) and was awaiting evaluation at tertiary care center because of her Hx of prior surgeries and infections. [facility] refused transfer. She presented to [facility] because of ongoing severe pain, erythema and inflammation. Ortho has evaluated and plan intervention on 10-25-21.     interval changes 10/23/21 No Fever chills  Left knee still red and very lightly tender  No abd  Pain   X-ray of the neck showed no osteomyelitis    Labs reviewed. Labs, X rays reviewed: 10/23/2021  Cr: 1.57  WBC: 10.4 - 7.8   Plat: 207    Cultures:  · Knee aspirate: Strep agalactiae    Xray leg  1. No acute osseous abnormality in the left femur or tibia/fibula. 2.  Left knee arthroplasty in place without evidence for hardware loosening. 3.  Leg length measurements provided above. Please note limitations and landmarks used. I have personally reviewed the past medical history, past surgical history, medications, social history, and family history, and I have updated the database accordingly. Past Medical History:     Past Medical History:   Diagnosis Date    Arthritis     Diabetes mellitus ([facility] [address]     Hyperlipidemia     Hypertension        Past Surgical  History:     Past Surgical History:   Procedure Laterality Date    HYSTERECTOMY         Medications:      [Held by provider] clopidogrel  75 mg Oral Daily    [Held by provider] fenofibrate  160 mg Oral Daily    [Held by provider] losartan  100 mg Oral Daily    [Held by provider] pantoprazole  40 mg Oral QAM AC    insulin lispro  0-12 Units SubCUTAneous TID WC    cefTRIAXone (ROCEPHIN) IV  2,000 mg IntraVENous Q24H    insulin lispro  0-6 Units SubCUTAneous Nightly    insulin glargine  50 Units SubCUTAneous Nightly    sodium chloride flush  5-40 mL IntraVENous 2 times per day    heparin (porcine)  5,000 Units SubCUTAneous 3 times per day       Social History:     Social History     Socioeconomic History    Marital status:       Spouse name: Not on file    Number of children: Not on file    Years of education: Not on file    Highest education level: Not on file   Occupational History    Not on file   Tobacco Use    Smoking status: Never Smoker   Substance and Sexual Activity    Alcohol use: No    Drug use: No    Sexual activity: Not on file   Other Topics Concern    Not on file   Social History Narrative    Not on file     Social Determinants of Health     Financial Resource Strain:     Difficulty of Paying Living Expenses:    Food Insecurity:     Worried About Running Out of Food in the Last Year:     [address] in the Last Year:    Transportation Needs:     Lack of Transportation (Medical):  Lack of Transportation (Non-Medical):    Physical Activity:     Days of Exercise per Week:     Minutes of Exercise per Session:    Stress:     Feeling of Stress :    Social Connections:     Frequency of Communication with Friends and Family:     Frequency of Social Gatherings with Friends and Family:     Attends [employer] Services:     Active Member of Clubs or Organizations:     Attends Club or Organization Meetings:     Marital Status:    Intimate Partner Violence:     Fear of Current or Ex-Partner:     Emotionally Abused:     Physically Abused:     Sexually Abused:        Family History:   No family history on file. Allergies:   Bactrim [sulfamethoxazole-trimethoprim], Codeine, and Lisinopril     Review of Systems:     Review of Systems   Constitutional: Positive for activity change. HENT: Negative for congestion. Eyes: Negative for pain. Respiratory: Negative for apnea and shortness of breath. Cardiovascular: Negative for chest pain. Gastrointestinal: Negative for abdominal distention. Endocrine: Negative for polydipsia. Genitourinary: Negative for dysuria. Musculoskeletal: Positive for arthralgias. Skin: Positive for color change. Allergic/Immunologic: Negative for immunocompromised state. Neurological: Negative for dizziness. Hematological: Negative for adenopathy. Psychiatric/Behavioral: Negative for agitation.           Physical Examination :     BP (!) 144/97   Pulse 82   Temp 98.1 °F (36.7 °C) (Oral)   Resp 18   Ht 5' 4\" (1.626 m)   Wt 296 lb 4.8 oz (134.4 kg)   SpO2 98%   BMI 50.86 kg/m² Physical Exam  Constitutional:       Appearance: Normal appearance. She is obese. HENT:      Head: Normocephalic and atraumatic. Nose: Nose normal.      Mouth/Throat:      Mouth: Mucous membranes are moist.   Eyes:      General: No scleral icterus. Conjunctiva/sclera: Conjunctivae normal.   Cardiovascular:      Rate and Rhythm: Normal rate and regular rhythm. Heart sounds: Normal heart sounds. No murmur heard. Pulmonary:      Effort: No respiratory distress. Breath sounds: Normal breath sounds. Abdominal:      General: There is no distension. Palpations: Abdomen is soft. Genitourinary:     Comments: No arce  Musculoskeletal:         General: No swelling or deformity. Cervical back: Neck supple. No rigidity. Skin:     General: Skin is dry. Coloration: Skin is not jaundiced. Neurological:      General: No focal deficit present. Mental Status: She is alert and oriented to person, place, and time. Psychiatric:         Mood and Affect: Mood normal.         Thought Content: Thought content normal.          Medical Decision Making -Laboratory:   I have independently reviewed/ordered the following labs:    CBC with Differential:   Recent Labs     10/21/21  2310 10/21/21  2310 10/22/21  2228 10/23/21  0504   WBC 10.4   < > 8.9 7.8   HGB 8.9*   < > 8.9* 8.8*   HCT 28.9*   < > 30.6* 28.9*      < > 216 318   LYMPHOPCT 13*  --   --  13*   MONOPCT 8  --   --  7    < > = values in this interval not displayed. BMP:   Recent Labs     10/21/21  2310 10/23/21  0504   * 134*   K 4.7 4.7    103   CO2 16* 17*   BUN 40* 33*   CREATININE 1.57* 1.15*     Hepatic Function Panel: No results for input(s): PROT, LABALBU, BILIDIR, IBILI, BILITOT, ALKPHOS, ALT, AST in the last 72 hours. No results for input(s): RPR in the last 72 hours. No results for input(s): HIV in the last 72 hours. No results for input(s): BC in the last 72 hours.   Lab Results   Component Value Date    MUCUS NOT REPORTED 10/22/2021    RBC 3.10 10/23/2021    TRICHOMONAS NOT REPORTED 10/22/2021    WBC 7.8 10/23/2021    YEAST NOT REPORTED 10/22/2021    TURBIDITY Clear 10/22/2021     Lab Results   Component Value Date    CREATININE 1.15 10/23/2021    GLUCOSE 159 10/23/2021         Tana Jain MD. Infectious Diseases

## 2021-10-23 NOTE — PROGRESS NOTES
Orthopedic Progress Note    Patient:  Grant Elliott  YOB: 1950     80 y.o. female    Subjective:  Patient seen and examined  No complaints or concerns  No issue overnight  Pain controlled  Denies fever, HA, CP, SOB    Vitals reviewed, afebrile    Objective:   Vitals:    10/23/21 0709   BP: (!) 144/97   Pulse: 82   Resp: 18   Temp: 98.1 °F (36.7 °C)   SpO2: 98%     Gen: NAD, cooperative     Cardiovascular: regular rate, no dependent edema    Respiratory: No acute respiratory distress    LLE: Edema throughout the leg. Erythema and warmth at the distal aspect of the tibia with associated TTP. Knee effusion noted; with limited ROM secondary to pain. Compartments soft. 2+ DP pulse. TA/EHL/FHL/GS motor intact. Deep and Superficial Peroneal/Saphenous/Sural SILT.     Recent Labs     10/23/21  0504   WBC 7.8   HGB 8.8*   HCT 28.9*      *   K 4.7   BUN 33*   CREATININE 1.15*   GLUCOSE 159*      Meds: Rocephin   See rec for complete list    Impression 80 y.o. female being seen for    - Left knee periprosthetic joint infection    Plan    - Plan for OR 10/25 for explant and static spacer placement  - Appreciate cardiac and primary clearance for surgery  - F/u cultures (GBS)   - WBAT to LLE  - Encourage IS and mobilization  - Continue with abx per primary recs  - DVT ppx; please hold the night prior to surgery    Electronically signed by Blayne Nunez DO 7:55 AM 10/23/2021

## 2021-10-24 LAB
ABSOLUTE EOS #: 0.13 K/UL (ref 0–0.44)
ABSOLUTE IMMATURE GRANULOCYTE: 0.25 K/UL (ref 0–0.3)
ABSOLUTE LYMPH #: 1.22 K/UL (ref 1.1–3.7)
ABSOLUTE MONO #: 0.58 K/UL (ref 0.1–1.2)
ANION GAP SERPL CALCULATED.3IONS-SCNC: 12 MMOL/L (ref 9–17)
BASOPHILS # BLD: 0 % (ref 0–2)
BASOPHILS ABSOLUTE: 0.03 K/UL (ref 0–0.2)
BUN BLDV-MCNC: 30 MG/DL (ref 8–23)
BUN/CREAT BLD: ABNORMAL (ref 9–20)
C-REACTIVE PROTEIN: 176.1 MG/L (ref 0–5)
CALCIUM SERPL-MCNC: 8.5 MG/DL (ref 8.6–10.4)
CHLORIDE BLD-SCNC: 104 MMOL/L (ref 98–107)
CO2: 21 MMOL/L (ref 20–31)
CREAT SERPL-MCNC: 1 MG/DL (ref 0.5–0.9)
DIFFERENTIAL TYPE: ABNORMAL
EOSINOPHILS RELATIVE PERCENT: 1 % (ref 1–4)
GFR AFRICAN AMERICAN: >60 ML/MIN
GFR NON-AFRICAN AMERICAN: 55 ML/MIN
GFR SERPL CREATININE-BSD FRML MDRD: ABNORMAL ML/MIN/{1.73_M2}
GFR SERPL CREATININE-BSD FRML MDRD: ABNORMAL ML/MIN/{1.73_M2}
GLUCOSE BLD-MCNC: 136 MG/DL (ref 65–105)
GLUCOSE BLD-MCNC: 163 MG/DL (ref 70–99)
GLUCOSE BLD-MCNC: 172 MG/DL (ref 65–105)
GLUCOSE BLD-MCNC: 194 MG/DL (ref 65–105)
GLUCOSE BLD-MCNC: 249 MG/DL (ref 65–105)
HCT VFR BLD CALC: 29.1 % (ref 36.3–47.1)
HEMOGLOBIN: 8.7 G/DL (ref 11.9–15.1)
IMMATURE GRANULOCYTES: 3 %
LYMPHOCYTES # BLD: 13 % (ref 24–43)
MAGNESIUM: 2.2 MG/DL (ref 1.6–2.6)
MCH RBC QN AUTO: 26.9 PG (ref 25.2–33.5)
MCHC RBC AUTO-ENTMCNC: 29.9 G/DL (ref 28.4–34.8)
MCV RBC AUTO: 90.1 FL (ref 82.6–102.9)
MONOCYTES # BLD: 6 % (ref 3–12)
NRBC AUTOMATED: 0 PER 100 WBC
PDW BLD-RTO: 14.8 % (ref 11.8–14.4)
PLATELET # BLD: 256 K/UL (ref 138–453)
PLATELET ESTIMATE: ABNORMAL
PMV BLD AUTO: 10.4 FL (ref 8.1–13.5)
POTASSIUM SERPL-SCNC: 5 MMOL/L (ref 3.7–5.3)
RBC # BLD: 3.23 M/UL (ref 3.95–5.11)
RBC # BLD: ABNORMAL 10*6/UL
SEG NEUTROPHILS: 77 % (ref 36–65)
SEGMENTED NEUTROPHILS ABSOLUTE COUNT: 7.14 K/UL (ref 1.5–8.1)
SODIUM BLD-SCNC: 137 MMOL/L (ref 135–144)
WBC # BLD: 9.4 K/UL (ref 3.5–11.3)
WBC # BLD: ABNORMAL 10*3/UL

## 2021-10-24 PROCEDURE — 1200000000 HC SEMI PRIVATE

## 2021-10-24 PROCEDURE — 94761 N-INVAS EAR/PLS OXIMETRY MLT: CPT

## 2021-10-24 PROCEDURE — 99232 SBSQ HOSP IP/OBS MODERATE 35: CPT | Performed by: FAMILY MEDICINE

## 2021-10-24 PROCEDURE — 2700000000 HC OXYGEN THERAPY PER DAY

## 2021-10-24 PROCEDURE — 6370000000 HC RX 637 (ALT 250 FOR IP): Performed by: NURSE PRACTITIONER

## 2021-10-24 PROCEDURE — 36415 COLL VENOUS BLD VENIPUNCTURE: CPT

## 2021-10-24 PROCEDURE — 82947 ASSAY GLUCOSE BLOOD QUANT: CPT

## 2021-10-24 PROCEDURE — 80048 BASIC METABOLIC PNL TOTAL CA: CPT

## 2021-10-24 PROCEDURE — 6360000002 HC RX W HCPCS: Performed by: NURSE PRACTITIONER

## 2021-10-24 PROCEDURE — 2580000003 HC RX 258: Performed by: NURSE PRACTITIONER

## 2021-10-24 PROCEDURE — 6360000002 HC RX W HCPCS: Performed by: STUDENT IN AN ORGANIZED HEALTH CARE EDUCATION/TRAINING PROGRAM

## 2021-10-24 PROCEDURE — 85025 COMPLETE CBC W/AUTO DIFF WBC: CPT

## 2021-10-24 PROCEDURE — 86140 C-REACTIVE PROTEIN: CPT

## 2021-10-24 PROCEDURE — 83735 ASSAY OF MAGNESIUM: CPT

## 2021-10-24 PROCEDURE — 6370000000 HC RX 637 (ALT 250 FOR IP): Performed by: FAMILY MEDICINE

## 2021-10-24 RX ORDER — INSULIN GLARGINE 100 [IU]/ML
30 INJECTION, SOLUTION SUBCUTANEOUS NIGHTLY
Status: DISCONTINUED | OUTPATIENT
Start: 2021-10-24 | End: 2021-10-28

## 2021-10-24 RX ORDER — AMLODIPINE BESYLATE 5 MG/1
5 TABLET ORAL DAILY
Status: DISCONTINUED | OUTPATIENT
Start: 2021-10-24 | End: 2021-10-25

## 2021-10-24 RX ADMIN — HEPARIN SODIUM 5000 UNITS: 5000 INJECTION INTRAVENOUS; SUBCUTANEOUS at 21:43

## 2021-10-24 RX ADMIN — OXYCODONE HYDROCHLORIDE AND ACETAMINOPHEN 1 TABLET: 5; 325 TABLET ORAL at 06:15

## 2021-10-24 RX ADMIN — HEPARIN SODIUM 5000 UNITS: 5000 INJECTION INTRAVENOUS; SUBCUTANEOUS at 14:55

## 2021-10-24 RX ADMIN — OXYCODONE HYDROCHLORIDE AND ACETAMINOPHEN 1 TABLET: 5; 325 TABLET ORAL at 14:55

## 2021-10-24 RX ADMIN — AMLODIPINE BESYLATE 5 MG: 5 TABLET ORAL at 10:48

## 2021-10-24 RX ADMIN — OXYCODONE HYDROCHLORIDE AND ACETAMINOPHEN 1 TABLET: 5; 325 TABLET ORAL at 19:11

## 2021-10-24 RX ADMIN — HEPARIN SODIUM 5000 UNITS: 5000 INJECTION INTRAVENOUS; SUBCUTANEOUS at 06:16

## 2021-10-24 RX ADMIN — OXYCODONE HYDROCHLORIDE AND ACETAMINOPHEN 1 TABLET: 5; 325 TABLET ORAL at 10:47

## 2021-10-24 RX ADMIN — CEFTRIAXONE SODIUM 2000 MG: 2 INJECTION, POWDER, FOR SOLUTION INTRAMUSCULAR; INTRAVENOUS at 10:48

## 2021-10-24 RX ADMIN — INSULIN GLARGINE 30 UNITS: 100 INJECTION, SOLUTION SUBCUTANEOUS at 21:42

## 2021-10-24 RX ADMIN — INSULIN LISPRO 2 UNITS: 100 INJECTION, SOLUTION INTRAVENOUS; SUBCUTANEOUS at 21:42

## 2021-10-24 ASSESSMENT — PAIN SCALES - GENERAL
PAINLEVEL_OUTOF10: 5
PAINLEVEL_OUTOF10: 6
PAINLEVEL_OUTOF10: 3
PAINLEVEL_OUTOF10: 4
PAINLEVEL_OUTOF10: 6
PAINLEVEL_OUTOF10: 6

## 2021-10-24 ASSESSMENT — PAIN DESCRIPTION - LOCATION: LOCATION: KNEE

## 2021-10-24 ASSESSMENT — PAIN DESCRIPTION - PROGRESSION
CLINICAL_PROGRESSION: NOT CHANGED

## 2021-10-24 ASSESSMENT — PAIN DESCRIPTION - DESCRIPTORS: DESCRIPTORS: ACHING;DISCOMFORT

## 2021-10-24 ASSESSMENT — PAIN DESCRIPTION - PAIN TYPE: TYPE: ACUTE PAIN

## 2021-10-24 ASSESSMENT — PAIN DESCRIPTION - ORIENTATION: ORIENTATION: LEFT

## 2021-10-24 NOTE — PROGRESS NOTES
Orthopedic Progress Note    Patient:  Analilia Pike  YOB: 1950     80 y.o. female    Subjective:  Patient seen and examined  No complaints or concerns  No issue overnight  Pain controlled  Denies fever, HA, CP, SOB    Vitals reviewed, afebrile    Objective:   Vitals:    10/24/21 0707   BP: (!) 171/75   Pulse: 88   Resp: 18   Temp: 98.9 °F (37.2 °C)   SpO2: 100%     Gen: NAD, cooperative     Cardiovascular: regular rate, no dependent edema    Respiratory: No acute respiratory distress    LLE: Edema throughout the leg. Erythema and warmth at the distal aspect of the tibia with associated TTP, erythema appears worsened. Knee effusion noted; with limited ROM secondary to pain. Compartments soft. 2+ DP pulse. TA/EHL/FHL/GS motor intact. Deep and Superficial Peroneal/Saphenous/Sural SILT.     Recent Labs     10/24/21  0455   WBC 9.4   HGB 8.7*   HCT 29.1*         K 5.0   BUN 30*   CREATININE 1.00*   GLUCOSE 163*      Meds: Rocephin   See rec for complete list    Impression 80 y.o. female being seen for    - Left knee periprosthetic joint infection    Plan    - Plan for OR 10/25 for explant and static spacer placement  - Appreciate cardiac and primary clearance for surgery  - F/u cultures (GBS)   - WBAT to LLE  - Encourage IS and mobilization  - Continue with abx per primary recs  - DVT ppx; please hold the night prior to surgery    Del , DO  Orthopedic Surgery Resident, PGY-3  [address]

## 2021-10-24 NOTE — PROGRESS NOTES
[facility]  Office: [address], DO, Diego Paz, DO, Denise Fine, DO, Pedro Klein [facility], DO, Sulma Hyman MD, Ebonie Christianson MD, Kennis Osler, MD, Alena Barahona MD, Graham Hensley MD, Irving Caicedo MD, Epifanio Glynn MD, Felicia Christie MD, Annabel Foss, DO, Dirk Becker DO, Cheli Wells MD,  Mark Colón DO, Rj Hung MD, Cruz Robison MD, Rick Jaimes MD, Maria L Best MD, Kya Lambert MD, Michael Leung MD, Ediat Spence [facility], [facility], CNP, Veronica Ho, CNP, Kayleigh Carvajal, CNS, Miguel A Fernandes, CNP, Micah Cervantes, CNP, Dali Taveras, CNP, Ashley Good, CNP, Ben Stokes, CNP, Raj Hernandez, CNP, Mihir Vaca PA-C, Tima Quigley, [facility], Chuck Agustin, CNP, Shae Amado, CNP, Valeria Hope, CNP, Ally Whitley, CNP, Sailaja Bautista, CNP, Je Neves, CNP, Sagrario Espinoza, [address] [place] [address]    Progress Note    10/24/2021    7:33 AM    Name:   Cl Mcdonald  MRN:     [id number]     Acct:      [de-identified]   Room:   [address] [place] [address] Day:  3  Admit Date:  10/21/2021 10:51 PM    PCP:   Adrián Johnson  Code Status:  Full Code    Subjective:     C/C: left knee pain      Interval History Status: not changed. Pt was seen and examined this mroning  No acute events overnight   No new complaints at this time     Review of Systems:     12 point ROS performed and negative for anything other than what was stated in subjective    Medications: Allergies:     Allergies   Allergen Reactions    Bactrim [Sulfamethoxazole-Trimethoprim]     Codeine     Lisinopril Other (See Comments)     cough       Current Meds:   Scheduled Meds:    insulin glargine  30 Units SubCUTAneous Nightly    [Held by provider] clopidogrel  75 mg Oral Daily    [Held by provider] fenofibrate  160 mg Oral Daily    [Held by provider] losartan  100 mg Oral Daily    [Held by provider] pantoprazole  40 mg Oral QAM AC    insulin lispro  0-12 Units SubCUTAneous TID WC    cefTRIAXone (ROCEPHIN) IV  2,000 mg IntraVENous Q24H    insulin lispro  0-6 Units SubCUTAneous Nightly    sodium chloride flush  5-40 mL IntraVENous 2 times per day    heparin (porcine)  5,000 Units SubCUTAneous 3 times per day     Continuous Infusions:    sodium chloride 75 mL/hr at 10/22/21 0516    dextrose      sodium chloride       PRN Meds: fentanNYL, glucose, dextrose, glucagon (rDNA), dextrose, sodium chloride flush, sodium chloride, ondansetron **OR** ondansetron, magnesium hydroxide, acetaminophen **OR** acetaminophen, oxyCODONE-acetaminophen    Data:     Past Medical History:   has a past medical history of Arthritis, Diabetes mellitus ([address] Hyperlipidemia, and Hypertension. Social History:   reports that she has never smoked. She does not have any smokeless tobacco history on file. She reports that she does not drink alcohol and does not use drugs. Family History: No family history on file. Vitals:  BP (!) 171/75   Pulse 88   Temp 98.9 °F (37.2 °C) (Oral)   Resp 18   Ht 5' 4\" (1.626 m)   Wt 298 lb (135.2 kg)   SpO2 100%   BMI 51.15 kg/m²   Temp (24hrs), Av.8 °F (37.1 °C), Min:98.6 °F (37 °C), Max:98.9 °F (37.2 °C)    Recent Labs     10/23/21  1558 10/23/21  2028 10/23/21  2213 10/24/21  0713   POCGLU 201* 228* 242* 136*       I/O (24Hr):     Intake/Output Summary (Last 24 hours) at 10/24/2021 0733  Last data filed at 10/24/2021 0719  Gross per 24 hour   Intake --   Output 1300 ml   Net -1300 ml       Labs:  Hematology:  Recent Labs     10/22/21  2228 10/23/21  0504 10/24/21  0455   WBC 8.9 7.8 9.4   RBC 3.29* 3.10* 3.23*   HGB 8.9* 8.8* 8.7*   HCT 30.6* 28.9* 29.1*   MCV 93.0 93.2 90.1   MCH 27.1 28.4 26.9   MCHC 29.1 30.4 29.9   RDW 14.7* 14.8* 14.8*    318 256   MPV 10.4 11.0 10.4     Chemistry:  Recent Labs     10/21/21  2310 10/23/21  0504 10/24/21  0455   * 134* 137   K 4.7 4.7 5.0    103 104   CO2 16* 17* 21 GLUCOSE 115* 159* 163*   BUN 40* 33* 30*   CREATININE 1.57* 1.15* 1.00*   ANIONGAP 15 14 12   LABGLOM 33* 47* 55*   GFRAA 39* 57* >60   CALCIUM 8.3* 7.9* 8.5*     Recent Labs     10/23/21  0701 10/23/21  1126 10/23/21  1558 10/23/21  2028 10/23/21  2213 10/24/21  0713   POCGLU 171* 175* 201* 228* 242* 136*     ABG:No results found for: POCPH, PHART, PH, POCPCO2, KNT8RGY, PCO2, POCPO2, PO2ART, PO2, POCHCO3, ZEX0FRO, HCO3, NBEA, PBEA, BEART, BE, THGBART, THB, VDV3NEJ, TOBM1NEK, A7MLEPDT, O2SAT, FIO2  Lab Results   Component Value Date/Time    SPECIAL RT 4ML 10/21/2021 11:00 PM     Lab Results   Component Value Date/Time    CULTURE NO GROWTH 2 DAYS 10/21/2021 11:00 PM       Radiology:  XR FEMUR LEFT (MIN 2 VIEWS)    Result Date: 10/22/2021  1. No acute osseous abnormality in the left femur or tibia/fibula. 2.  Left knee arthroplasty in place without evidence for hardware loosening. 3.  Leg length measurements provided above. Please note limitations and landmarks used. XR KNEE LEFT (3 VIEWS)    Result Date: 10/22/2021  1. Soft tissue edema and effusion. 2. No acute osseous abnormality. XR TIBIA FIBULA LEFT (2 VIEWS)    Result Date: 10/22/2021  1. No acute osseous abnormality in the left femur or tibia/fibula. 2.  Left knee arthroplasty in place without evidence for hardware loosening. 3.  Leg length measurements provided above. Please note limitations and landmarks used. XR CHEST PORTABLE    Result Date: 10/22/2021  1. Aberrant positioning of the right upper extremity PICC line. Tip is either within the distal left subclavian vein, or directed into the azygous vein. Repositioning is recommended 2.  Report was marked to be called to a licensed caregiver       Physical Examination:        General appearance:  alert, cooperative and no distress  Mental Status:  oriented to person, place and time and normal affect  Lungs:  clear to auscultation bilaterally, normal effort  Heart:  regular rate and rhythm, no murmur  Abdomen:  soft, nontender, nondistended, normal bowel sounds  Extremities: left knee edema and TTP  Skin:  no gross lesions, rashes, induration    Assessment:        Hospital Problems         Last Modified POA    * (Principal) Staphylococcal arthritis of left knee ([facility] [address] 10/22/2021 Yes    Type 2 diabetes mellitus without complication, without long-term current use of insulin ([address] 10/22/2021 Yes    PITA on CPAP 10/22/2021 Yes    CAD (coronary artery disease) 10/22/2021 Yes    COPD (chronic obstructive pulmonary disease) ([address] 10/22/2021 Yes    Primary hypertension 10/22/2021 Yes    Acute kidney injury superimposed on CKD ([address] 10/22/2021 Yes    Normocytic normochromic anemia 10/22/2021 Yes          Plan:        1. Suspected septic arthritis:   - Ortho and ID consulted    - will resume IV Ceftriaxone 2gm QD   - per ortho, Aspiration performed at [address] [place] [address] demonstrates a WBC count of 140 2K which is consistent with a left knee periprosthetic infection.    - Patient require operative intervention in the form of an explant and static spacer placement. - plan for OR on 10/25 for explant and static spacer placement  - cardiology consulted for cardiac clearance. Recommend stress test for further risk stratification      2. DHRUV on CKD:   - renal function improved  - renally dose all meds  - avoid nephrotoxic agents     3. Hyponatremia:   - improved  - continue to monitor      4. NCNC Anemia:   - HGb stable     5. Type II DM:   - accu checks ac/hs with ISS  - continuie home lantus      6. COPD:  - no evidence of acute exacerbation     7. HTN:   - controlled  - v/s per unit protocol   - continue home anti hypertensives  - losartan held due to DHRUV   - started on norvasc      8. CAD s/p CABG:  - on plavix, no acute ischemic changes on EKG     9.  DVT prophylaxis:   - SC Heparin    Migdalia Bang MD  10/24/2021  7:33 AM

## 2021-10-24 NOTE — PROGRESS NOTES
[facility]  Progress Note                   Date:   10/24/2021  Patient name: Cl Mcdonald  Date of admission:  10/21/2021 10:51 PM  MRN:   [id number]  YOB: 1950  PCP: Adrián Johnson    Reason for Admission: Septic arthritis of knee, bilateral ([facility] [address] [M00.9]    Subjective:       Clinical Changes /Abnormalities:  Patient seen and examined in bed in room with family present. Discussed with RN. Denies chest pain. Ongoing SOB at baseline on 2 L NC at home with use of BiPaP. Cardiac risk stratification requested per ortho for possible surgery tomorrow. STRESS recommended per Dr Satish Paulino. Patient agrees to proceed. NPO at midnight for stress in AM.  Not on tele   Will order tele. Review of Systems    Medications:   Scheduled Meds:   insulin glargine  30 Units SubCUTAneous Nightly    [Held by provider] clopidogrel  75 mg Oral Daily    [Held by provider] fenofibrate  160 mg Oral Daily    [Held by provider] losartan  100 mg Oral Daily    [Held by provider] pantoprazole  40 mg Oral QAM AC    insulin lispro  0-12 Units SubCUTAneous TID WC    cefTRIAXone (ROCEPHIN) IV  2,000 mg IntraVENous Q24H    insulin lispro  0-6 Units SubCUTAneous Nightly    sodium chloride flush  5-40 mL IntraVENous 2 times per day    heparin (porcine)  5,000 Units SubCUTAneous 3 times per day     Continuous Infusions:   sodium chloride 75 mL/hr at 10/22/21 0516    dextrose      sodium chloride       CBC:   Recent Labs     10/22/21  2228 10/23/21  0504 10/24/21  0455   WBC 8.9 7.8 9.4   HGB 8.9* 8.8* 8.7*    318 256     BMP:    Recent Labs     10/21/21  2310 10/23/21  0504 10/24/21  0455   * 134* 137   K 4.7 4.7 5.0    103 104   CO2 16* 17* 21   BUN 40* 33* 30*   CREATININE 1.57* 1.15* 1.00*   GLUCOSE 115* 159* 163*     Hepatic:No results for input(s): AST, ALT, ALB, BILITOT, ALKPHOS in the last 72 hours. Troponin: No results for input(s): TROPHS in the last 72 hours.   BNP: No results for input(s): BNP in the last 72 hours. Lipids: No results for input(s): CHOL, HDL in the last 72 hours. Invalid input(s): LDLCALCU  INR: No results for input(s): INR in the last 72 hours. DATA:    Diagnostics:       EKG: NSR, NL ECG     STRESS ORDERED/PENDING      Objective:   Vitals: BP (!) 171/75   Pulse 88   Temp 98.9 °F (37.2 °C) (Oral)   Resp 18   Ht 5' 4\" (1.626 m)   Wt 298 lb (135.2 kg)   SpO2 95%   BMI 51.15 kg/m²   General appearance: alert and cooperative with exam  HEENT: Head: Normocephalic, no lesions, without obvious abnormality. Neck:no JVD, trachea midline, no adenopathy  Lungs: Clear to auscultation  NC oxygen 1.5-2 L  Heart: Regular rate and rhythm, s1/s2 auscultated, no murmurs. Not on tele. 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  · Hx of Lt TKA 2007  · Prior Lt knee infections x 2  · DM 2  · Essential HTN  · COPD     Patient Active Problem List:     Staphylococcal arthritis of left knee (HCC)     Type 2 diabetes mellitus without complication, without long-term current use of insulin (HCC)     PITA on CPAP     CAD (coronary artery disease)     COPD (chronic obstructive pulmonary disease) ([facility] [address]     Primary hypertension     Acute kidney injury superimposed on CKD ([facility] [address]     Normocytic normochromic anemia      Plan of Treatment:   1. Stress on Monday. NPO at midnight. Preop cardiovascular evaluation for Ortho surgery. 2. HTN elevated prior to AM medications. Continue Norvasc. Losartan on hold d/t elevated creatinine   3. Not on Tele. Will order telemetry. 4. LE edema. Will order bilateral LE ace wraps. 5. Keep K > 4.0 and Mag > 2.0 K 5.0 and Mag 2.2 today.    6. Rest of care managed per Primary Team.     Electronically signed by DINESH Whitaker NP on 10/24/2021 at 9:23 AM  [address]  [phone]

## 2021-10-25 ENCOUNTER — APPOINTMENT (OUTPATIENT)
Dept: NUCLEAR MEDICINE | Age: 71
DRG: 464 | End: 2021-10-25
Attending: STUDENT IN AN ORGANIZED HEALTH CARE EDUCATION/TRAINING PROGRAM
Payer: COMMERCIAL

## 2021-10-25 ENCOUNTER — APPOINTMENT (OUTPATIENT)
Dept: CARDIAC CATH/INVASIVE PROCEDURES | Age: 71
DRG: 464 | End: 2021-10-25
Attending: STUDENT IN AN ORGANIZED HEALTH CARE EDUCATION/TRAINING PROGRAM
Payer: COMMERCIAL

## 2021-10-25 LAB
ABSOLUTE EOS #: 0.19 K/UL (ref 0–0.44)
ABSOLUTE IMMATURE GRANULOCYTE: 0.28 K/UL (ref 0–0.3)
ABSOLUTE LYMPH #: 1.38 K/UL (ref 1.1–3.7)
ABSOLUTE MONO #: 0.55 K/UL (ref 0.1–1.2)
ANION GAP SERPL CALCULATED.3IONS-SCNC: 8 MMOL/L (ref 9–17)
BASOPHILS # BLD: 1 % (ref 0–2)
BASOPHILS ABSOLUTE: 0.05 K/UL (ref 0–0.2)
BLOOD BANK SPECIMEN: NORMAL
BUN BLDV-MCNC: 23 MG/DL (ref 8–23)
BUN/CREAT BLD: ABNORMAL (ref 9–20)
CALCIUM SERPL-MCNC: 8.2 MG/DL (ref 8.6–10.4)
CHLORIDE BLD-SCNC: 99 MMOL/L (ref 98–107)
CO2: 21 MMOL/L (ref 20–31)
CREAT SERPL-MCNC: 0.91 MG/DL (ref 0.5–0.9)
DIFFERENTIAL TYPE: ABNORMAL
EOSINOPHILS RELATIVE PERCENT: 2 % (ref 1–4)
GFR AFRICAN AMERICAN: >60 ML/MIN
GFR NON-AFRICAN AMERICAN: >60 ML/MIN
GFR SERPL CREATININE-BSD FRML MDRD: ABNORMAL ML/MIN/{1.73_M2}
GFR SERPL CREATININE-BSD FRML MDRD: ABNORMAL ML/MIN/{1.73_M2}
GLUCOSE BLD-MCNC: 128 MG/DL (ref 65–105)
GLUCOSE BLD-MCNC: 130 MG/DL (ref 70–99)
GLUCOSE BLD-MCNC: 181 MG/DL (ref 65–105)
HCT VFR BLD CALC: 29.8 % (ref 36.3–47.1)
HEMOGLOBIN: 8.9 G/DL (ref 11.9–15.1)
IMMATURE GRANULOCYTES: 4 %
LV EF: 55 %
LVEF MODALITY: NORMAL
LYMPHOCYTES # BLD: 18 % (ref 24–43)
MCH RBC QN AUTO: 26.9 PG (ref 25.2–33.5)
MCHC RBC AUTO-ENTMCNC: 29.9 G/DL (ref 28.4–34.8)
MCV RBC AUTO: 90 FL (ref 82.6–102.9)
MONOCYTES # BLD: 7 % (ref 3–12)
NRBC AUTOMATED: 0 PER 100 WBC
PDW BLD-RTO: 14.6 % (ref 11.8–14.4)
PLATELET # BLD: 266 K/UL (ref 138–453)
PLATELET ESTIMATE: ABNORMAL
PMV BLD AUTO: 9.9 FL (ref 8.1–13.5)
POTASSIUM SERPL-SCNC: 5.2 MMOL/L (ref 3.7–5.3)
RBC # BLD: 3.31 M/UL (ref 3.95–5.11)
RBC # BLD: ABNORMAL 10*6/UL
SEG NEUTROPHILS: 68 % (ref 36–65)
SEGMENTED NEUTROPHILS ABSOLUTE COUNT: 5.44 K/UL (ref 1.5–8.1)
SODIUM BLD-SCNC: 128 MMOL/L (ref 135–144)
WBC # BLD: 7.9 K/UL (ref 3.5–11.3)
WBC # BLD: ABNORMAL 10*3/UL

## 2021-10-25 PROCEDURE — 2709999900 HC NON-CHARGEABLE SUPPLY

## 2021-10-25 PROCEDURE — 4A023N8 MEASUREMENT OF CARDIAC SAMPLING AND PRESSURE, BILATERAL, PERCUTANEOUS APPROACH: ICD-10-PCS | Performed by: INTERNAL MEDICINE

## 2021-10-25 PROCEDURE — 93017 CV STRESS TEST TRACING ONLY: CPT

## 2021-10-25 PROCEDURE — 86850 RBC ANTIBODY SCREEN: CPT

## 2021-10-25 PROCEDURE — 6360000002 HC RX W HCPCS: Performed by: NURSE PRACTITIONER

## 2021-10-25 PROCEDURE — 82947 ASSAY GLUCOSE BLOOD QUANT: CPT

## 2021-10-25 PROCEDURE — C1894 INTRO/SHEATH, NON-LASER: HCPCS

## 2021-10-25 PROCEDURE — 93458 L HRT ARTERY/VENTRICLE ANGIO: CPT | Performed by: INTERNAL MEDICINE

## 2021-10-25 PROCEDURE — 86901 BLOOD TYPING SEROLOGIC RH(D): CPT

## 2021-10-25 PROCEDURE — 6360000004 HC RX CONTRAST MEDICATION

## 2021-10-25 PROCEDURE — 99232 SBSQ HOSP IP/OBS MODERATE 35: CPT | Performed by: FAMILY MEDICINE

## 2021-10-25 PROCEDURE — 86920 COMPATIBILITY TEST SPIN: CPT

## 2021-10-25 PROCEDURE — 86900 BLOOD TYPING SEROLOGIC ABO: CPT

## 2021-10-25 PROCEDURE — B2111ZZ FLUOROSCOPY OF MULTIPLE CORONARY ARTERIES USING LOW OSMOLAR CONTRAST: ICD-10-PCS | Performed by: INTERNAL MEDICINE

## 2021-10-25 PROCEDURE — 99232 SBSQ HOSP IP/OBS MODERATE 35: CPT | Performed by: INTERNAL MEDICINE

## 2021-10-25 PROCEDURE — 6370000000 HC RX 637 (ALT 250 FOR IP): Performed by: NURSE PRACTITIONER

## 2021-10-25 PROCEDURE — 1200000000 HC SEMI PRIVATE

## 2021-10-25 PROCEDURE — B2151ZZ FLUOROSCOPY OF LEFT HEART USING LOW OSMOLAR CONTRAST: ICD-10-PCS | Performed by: INTERNAL MEDICINE

## 2021-10-25 PROCEDURE — 85025 COMPLETE CBC W/AUTO DIFF WBC: CPT

## 2021-10-25 PROCEDURE — 78452 HT MUSCLE IMAGE SPECT MULT: CPT

## 2021-10-25 PROCEDURE — 10701147 HC NON-CHARGEABLE SUPPLY

## 2021-10-25 PROCEDURE — 2580000003 HC RX 258: Performed by: NURSE PRACTITIONER

## 2021-10-25 PROCEDURE — 80048 BASIC METABOLIC PNL TOTAL CA: CPT

## 2021-10-25 PROCEDURE — A9500 TC99M SESTAMIBI: HCPCS | Performed by: NURSE PRACTITIONER

## 2021-10-25 PROCEDURE — 6370000000 HC RX 637 (ALT 250 FOR IP): Performed by: FAMILY MEDICINE

## 2021-10-25 PROCEDURE — 2500000003 HC RX 250 WO HCPCS

## 2021-10-25 PROCEDURE — 6360000002 HC RX W HCPCS

## 2021-10-25 PROCEDURE — 3430000000 HC RX DIAGNOSTIC RADIOPHARMACEUTICAL: Performed by: NURSE PRACTITIONER

## 2021-10-25 PROCEDURE — 76937 US GUIDE VASCULAR ACCESS: CPT

## 2021-10-25 PROCEDURE — B2131ZZ FLUOROSCOPY OF MULTIPLE CORONARY ARTERY BYPASS GRAFTS USING LOW OSMOLAR CONTRAST: ICD-10-PCS | Performed by: INTERNAL MEDICINE

## 2021-10-25 PROCEDURE — 36415 COLL VENOUS BLD VENIPUNCTURE: CPT

## 2021-10-25 RX ORDER — ATROPINE SULFATE 0.1 MG/ML
0.5 INJECTION INTRAVENOUS EVERY 5 MIN PRN
Status: DISCONTINUED | OUTPATIENT
Start: 2021-10-25 | End: 2021-10-25

## 2021-10-25 RX ORDER — SODIUM CHLORIDE 0.9 % (FLUSH) 0.9 %
5-40 SYRINGE (ML) INJECTION PRN
Status: DISCONTINUED | OUTPATIENT
Start: 2021-10-25 | End: 2021-10-25

## 2021-10-25 RX ORDER — SODIUM CHLORIDE 9 MG/ML
25 INJECTION, SOLUTION INTRAVENOUS PRN
Status: DISCONTINUED | OUTPATIENT
Start: 2021-10-25 | End: 2021-10-29

## 2021-10-25 RX ORDER — AMLODIPINE BESYLATE 10 MG/1
10 TABLET ORAL DAILY
Status: DISCONTINUED | OUTPATIENT
Start: 2021-10-25 | End: 2021-11-04 | Stop reason: HOSPADM

## 2021-10-25 RX ORDER — SODIUM CHLORIDE 0.9 % (FLUSH) 0.9 %
5-40 SYRINGE (ML) INJECTION PRN
Status: DISCONTINUED | OUTPATIENT
Start: 2021-10-25 | End: 2021-10-29

## 2021-10-25 RX ORDER — SODIUM CHLORIDE 0.9 % (FLUSH) 0.9 %
5-40 SYRINGE (ML) INJECTION EVERY 12 HOURS SCHEDULED
Status: DISCONTINUED | OUTPATIENT
Start: 2021-10-25 | End: 2021-11-04 | Stop reason: HOSPADM

## 2021-10-25 RX ORDER — ALBUTEROL SULFATE 90 UG/1
2 AEROSOL, METERED RESPIRATORY (INHALATION) PRN
Status: DISCONTINUED | OUTPATIENT
Start: 2021-10-25 | End: 2021-10-25

## 2021-10-25 RX ORDER — SODIUM CHLORIDE 9 MG/ML
500 INJECTION, SOLUTION INTRAVENOUS CONTINUOUS PRN
Status: DISCONTINUED | OUTPATIENT
Start: 2021-10-25 | End: 2021-10-25

## 2021-10-25 RX ORDER — METOPROLOL TARTRATE 5 MG/5ML
5 INJECTION INTRAVENOUS EVERY 5 MIN PRN
Status: DISCONTINUED | OUTPATIENT
Start: 2021-10-25 | End: 2021-10-25

## 2021-10-25 RX ORDER — SODIUM CHLORIDE 9 MG/ML
INJECTION, SOLUTION INTRAVENOUS PRN
Status: COMPLETED | OUTPATIENT
Start: 2021-10-25 | End: 2021-10-26

## 2021-10-25 RX ORDER — SODIUM CHLORIDE 0.9 % (FLUSH) 0.9 %
10 SYRINGE (ML) INJECTION PRN
Status: DISCONTINUED | OUTPATIENT
Start: 2021-10-25 | End: 2021-10-29

## 2021-10-25 RX ORDER — AMINOPHYLLINE DIHYDRATE 25 MG/ML
50 INJECTION, SOLUTION INTRAVENOUS PRN
Status: DISCONTINUED | OUTPATIENT
Start: 2021-10-25 | End: 2021-10-25

## 2021-10-25 RX ORDER — CARVEDILOL 3.12 MG/1
3.12 TABLET ORAL 2 TIMES DAILY WITH MEALS
Status: DISCONTINUED | OUTPATIENT
Start: 2021-10-25 | End: 2021-11-04 | Stop reason: HOSPADM

## 2021-10-25 RX ORDER — SODIUM CHLORIDE 9 MG/ML
INJECTION, SOLUTION INTRAVENOUS CONTINUOUS
Status: DISCONTINUED | OUTPATIENT
Start: 2021-10-25 | End: 2021-10-27

## 2021-10-25 RX ORDER — NITROGLYCERIN 0.4 MG/1
0.4 TABLET SUBLINGUAL EVERY 5 MIN PRN
Status: DISCONTINUED | OUTPATIENT
Start: 2021-10-25 | End: 2021-10-25

## 2021-10-25 RX ORDER — ACETAMINOPHEN 325 MG/1
650 TABLET ORAL EVERY 4 HOURS PRN
Status: DISCONTINUED | OUTPATIENT
Start: 2021-10-25 | End: 2021-11-04 | Stop reason: HOSPADM

## 2021-10-25 RX ADMIN — CARVEDILOL 3.12 MG: 3.12 TABLET, FILM COATED ORAL at 17:00

## 2021-10-25 RX ADMIN — SODIUM CHLORIDE, PRESERVATIVE FREE 10 ML: 5 INJECTION INTRAVENOUS at 07:36

## 2021-10-25 RX ADMIN — OXYCODONE HYDROCHLORIDE AND ACETAMINOPHEN 1 TABLET: 5; 325 TABLET ORAL at 18:16

## 2021-10-25 RX ADMIN — FENTANYL CITRATE 50 MCG: 50 INJECTION, SOLUTION INTRAMUSCULAR; INTRAVENOUS at 17:00

## 2021-10-25 RX ADMIN — SODIUM CHLORIDE, PRESERVATIVE FREE 10 ML: 5 INJECTION INTRAVENOUS at 10:47

## 2021-10-25 RX ADMIN — SODIUM CHLORIDE, PRESERVATIVE FREE 10 ML: 5 INJECTION INTRAVENOUS at 10:51

## 2021-10-25 RX ADMIN — SODIUM CHLORIDE, PRESERVATIVE FREE 10 ML: 5 INJECTION INTRAVENOUS at 10:46

## 2021-10-25 RX ADMIN — LOSARTAN POTASSIUM 100 MG: 50 TABLET, FILM COATED ORAL at 17:06

## 2021-10-25 RX ADMIN — AMLODIPINE BESYLATE 10 MG: 10 TABLET ORAL at 17:06

## 2021-10-25 RX ADMIN — SODIUM CHLORIDE, PRESERVATIVE FREE 10 ML: 5 INJECTION INTRAVENOUS at 10:33

## 2021-10-25 RX ADMIN — REGADENOSON 0.4 MG: 0.08 INJECTION, SOLUTION INTRAVENOUS at 10:45

## 2021-10-25 RX ADMIN — OXYCODONE HYDROCHLORIDE AND ACETAMINOPHEN 1 TABLET: 5; 325 TABLET ORAL at 00:57

## 2021-10-25 RX ADMIN — INSULIN GLARGINE 30 UNITS: 100 INJECTION, SOLUTION SUBCUTANEOUS at 20:36

## 2021-10-25 RX ADMIN — OXYCODONE HYDROCHLORIDE AND ACETAMINOPHEN 1 TABLET: 5; 325 TABLET ORAL at 06:51

## 2021-10-25 RX ADMIN — SODIUM CHLORIDE: 9 INJECTION, SOLUTION INTRAVENOUS at 01:00

## 2021-10-25 RX ADMIN — TETRAKIS(2-METHOXYISOBUTYLISOCYANIDE)COPPER(I) TETRAFLUOROBORATE 44 MILLICURIE: 1 INJECTION, POWDER, LYOPHILIZED, FOR SOLUTION INTRAVENOUS at 10:46

## 2021-10-25 RX ADMIN — AMINOPHYLLINE 50 MG: 25 INJECTION, SOLUTION INTRAVENOUS at 10:49

## 2021-10-25 RX ADMIN — TETRAKIS(2-METHOXYISOBUTYLISOCYANIDE)COPPER(I) TETRAFLUOROBORATE 19.8 MILLICURIE: 1 INJECTION, POWDER, LYOPHILIZED, FOR SOLUTION INTRAVENOUS at 07:36

## 2021-10-25 RX ADMIN — CEFTRIAXONE SODIUM 2000 MG: 2 INJECTION, POWDER, FOR SOLUTION INTRAMUSCULAR; INTRAVENOUS at 17:13

## 2021-10-25 RX ADMIN — OXYCODONE HYDROCHLORIDE AND ACETAMINOPHEN 1 TABLET: 5; 325 TABLET ORAL at 22:50

## 2021-10-25 RX ADMIN — INSULIN LISPRO 1 UNITS: 100 INJECTION, SOLUTION INTRAVENOUS; SUBCUTANEOUS at 20:36

## 2021-10-25 ASSESSMENT — PAIN SCALES - GENERAL
PAINLEVEL_OUTOF10: 6
PAINLEVEL_OUTOF10: 6
PAINLEVEL_OUTOF10: 8
PAINLEVEL_OUTOF10: 3
PAINLEVEL_OUTOF10: 10
PAINLEVEL_OUTOF10: 6
PAINLEVEL_OUTOF10: 6
PAINLEVEL_OUTOF10: 5

## 2021-10-25 ASSESSMENT — PAIN DESCRIPTION - ORIENTATION: ORIENTATION: LEFT

## 2021-10-25 ASSESSMENT — PAIN DESCRIPTION - PROGRESSION: CLINICAL_PROGRESSION: NOT CHANGED

## 2021-10-25 ASSESSMENT — PAIN DESCRIPTION - PAIN TYPE: TYPE: ACUTE PAIN

## 2021-10-25 ASSESSMENT — PAIN DESCRIPTION - FREQUENCY: FREQUENCY: CONTINUOUS

## 2021-10-25 ASSESSMENT — PAIN DESCRIPTION - LOCATION: LOCATION: LEG

## 2021-10-25 ASSESSMENT — PAIN DESCRIPTION - DESCRIPTORS: DESCRIPTORS: ACHING;DISCOMFORT

## 2021-10-25 ASSESSMENT — PAIN DESCRIPTION - ONSET: ONSET: ON-GOING

## 2021-10-25 NOTE — PROGRESS NOTES
[facility]  Office: [address], DO, Link Benne, DO, Grace Coco, DO, Joseph Palmaer Blood, DO, Freedom De León MD, Earnest Ramires MD, Trice Antoine MD, Fritz Salgado MD, Dianna Sellers MD, Ashley Valentino MD, Penny James MD, Glo Moritz, MD, [place], DO, Husam Fontaine DO, Ector Martin MD,  Yue Umaña, DO, Sacha Quiroz MD, Contreras English MD, José Antonio Malik MD, Cristóbal Martinez MD, Marguerite Da Silva MD, Lurdes Delatorre MD, Paola Aranda, [employer], [facility], CNP, Lanny Portillo, CNP, Bird Balderrama, CNS, Marcos Farias, CNP, Eliseo Gentile, CNP, Venita Lara, CNP, Dilcia Thomas, CNP, Reanna Musa, CNP, Aleja Hayden, CNP, Marta Foss PA-C, Killian White, [facility], Onel Recio, CNP, Tate Arrieta, CNP, Rosenda Lr, CNP, Colonel Go, CNP, Susan So, CNP, Familia Nava, CNP, Homa Linares, [address] [place] [address]    Progress Note    10/25/2021    7:31 AM    Name:   Melvin Dobson  MRN:     [id number]     Acct:      [de-identified]   Room:   [address] [place] [address] Day:  4  Admit Date:  10/21/2021 10:51 PM    PCP:   Jassi Goode  Code Status:  Full Code    Subjective:     C/C: No chief complaint on file. Interval History Status: not changed. Pt seen and examined this morning  No acute events overnight  No new complaints     Brief History:     80 y.o. female being seen for a left knee periprosthetic joint infection. Patient overall feeling well aside from pain in left knee. Plan for OR today for explantation and antibiotic spacer. Review of Systems:     12 point ROS performed and negative for anything other than what was stated in subjective    Medications: Allergies:     Allergies   Allergen Reactions    Bactrim [Sulfamethoxazole-Trimethoprim]     Codeine     Lisinopril Other (See Comments)     cough       Current Meds:   Scheduled Meds:    amLODIPine  10 mg Oral Daily    insulin glargine  30 Units SubCUTAneous Nightly    ceFAZolin  2,000 mg IntraVENous On Call to [address] [place] [address] Avenue by provider] clopidogrel  75 mg Oral Daily    [Held by provider] fenofibrate  160 mg Oral Daily    losartan  100 mg Oral Daily    pantoprazole  40 mg Oral QAM AC    insulin lispro  0-12 Units SubCUTAneous TID WC    cefTRIAXone (ROCEPHIN) IV  2,000 mg IntraVENous Q24H    insulin lispro  0-6 Units SubCUTAneous Nightly    sodium chloride flush  5-40 mL IntraVENous 2 times per day    heparin (porcine)  5,000 Units SubCUTAneous 3 times per day     Continuous Infusions:    sodium chloride      sodium chloride      dextrose      sodium chloride       PRN Meds: regadenoson, sodium chloride flush, sodium chloride, albuterol sulfate HFA, atropine, nitroGLYCERIN, metoprolol, aminophylline, sodium chloride, fentanNYL, glucose, dextrose, glucagon (rDNA), dextrose, sodium chloride flush, sodium chloride, ondansetron **OR** ondansetron, magnesium hydroxide, acetaminophen **OR** acetaminophen, oxyCODONE-acetaminophen    Data:     Past Medical History:   has a past medical history of Arthritis, Diabetes mellitus ([address] Hyperlipidemia, and Hypertension. Social History:   reports that she has never smoked. She does not have any smokeless tobacco history on file. She reports that she does not drink alcohol and does not use drugs. Family History: No family history on file. Vitals:  BP (!) 188/77   Pulse 97   Temp 98.2 °F (36.8 °C) (Oral)   Resp 18   Ht 5' 4\" (1.626 m)   Wt 298 lb (135.2 kg)   SpO2 94%   BMI 51.15 kg/m²   Temp (24hrs), Av.5 °F (36.9 °C), Min:98.2 °F (36.8 °C), Max:98.7 °F (37.1 °C)    Recent Labs     10/24/21  0713 10/24/21  1118 10/24/21  1528 10/24/21  2130   POCGLU 136* 172* 194* 249*       I/O (24Hr):     Intake/Output Summary (Last 24 hours) at 10/25/2021 0731  Last data filed at 10/25/2021 0659  Gross per 24 hour   Intake --   Output 1250 ml   Net -1250 ml       Labs:  Hematology:  Recent Labs 10/23/21  0504 10/24/21  0455 10/25/21  0504   WBC 7.8 9.4 7.9   RBC 3.10* 3.23* 3.31*   HGB 8.8* 8.7* 8.9*   HCT 28.9* 29.1* 29.8*   MCV 93.2 90.1 90.0   MCH 28.4 26.9 26.9   MCHC 30.4 29.9 29.9   RDW 14.8* 14.8* 14.6*    256 266   MPV 11.0 10.4 9.9   CRP  --  176.1*  --      Chemistry:  Recent Labs     10/23/21  0504 10/24/21  0455 10/25/21  0504   * 137 128*   K 4.7 5.0 5.2    104 99   CO2 17* 21 21   GLUCOSE 159* 163* 130*   BUN 33* 30* 23   CREATININE 1.15* 1.00* 0.91*   MG  --  2.2  --    ANIONGAP 14 12 8*   LABGLOM 47* 55* >60   GFRAA 57* >60 >60   CALCIUM 7.9* 8.5* 8.2*     Recent Labs     10/23/21  2028 10/23/21  2213 10/24/21  0713 10/24/21  1118 10/24/21  1528 10/24/21  2130   POCGLU 228* 242* 136* 172* 194* 249*     ABG:No results found for: POCPH, PHART, PH, POCPCO2, TFO8RHT, PCO2, POCPO2, PO2ART, PO2, POCHCO3, JTO3PTJ, HCO3, NBEA, PBEA, BEART, BE, THGBART, THB, EUL5PMR, AGRO6WIP, M4ITJRNF, O2SAT, FIO2  Lab Results   Component Value Date/Time    SPECIAL RT 4ML 10/21/2021 11:00 PM     Lab Results   Component Value Date/Time    CULTURE NO GROWTH 4 DAYS 10/21/2021 11:00 PM       Radiology:  XR FEMUR LEFT (MIN 2 VIEWS)    Result Date: 10/22/2021  1. No acute osseous abnormality in the left femur or tibia/fibula. 2.  Left knee arthroplasty in place without evidence for hardware loosening. 3.  Leg length measurements provided above. Please note limitations and landmarks used. XR KNEE LEFT (3 VIEWS)    Result Date: 10/22/2021  1. Soft tissue edema and effusion. 2. No acute osseous abnormality. XR TIBIA FIBULA LEFT (2 VIEWS)    Result Date: 10/22/2021  1. No acute osseous abnormality in the left femur or tibia/fibula. 2.  Left knee arthroplasty in place without evidence for hardware loosening. 3.  Leg length measurements provided above. Please note limitations and landmarks used. XR CHEST PORTABLE    Result Date: 10/22/2021  1.  Aberrant positioning of the right upper extremity PICC line. Tip is either within the distal left subclavian vein, or directed into the azygous vein. Repositioning is recommended 2. Report was marked to be called to a licensed caregiver       Physical Examination:        General appearance:  alert, cooperative and no distress  Mental Status:  oriented to person, place and time and normal affect  Lungs:  clear to auscultation bilaterally, normal effort  Heart:  regular rate and rhythm, no murmur  Abdomen:  soft, nontender, nondistended, normal bowel sounds  Extremities: left knee edema and TTP  Skin:  no gross lesions, rashes, induration    Assessment:        Hospital Problems         Last Modified POA    * (Principal) Staphylococcal arthritis of left knee ([address] 10/22/2021 Yes    Type 2 diabetes mellitus without complication, without long-term current use of insulin ([address] 10/22/2021 Yes    PITA on CPAP 10/22/2021 Yes    CAD (coronary artery disease) 10/22/2021 Yes    COPD (chronic obstructive pulmonary disease) ([address] 10/22/2021 Yes    Primary hypertension 10/22/2021 Yes    Acute kidney injury superimposed on CKD ([address] 10/22/2021 Yes    Normocytic normochromic anemia 10/22/2021 Yes          Plan:        1. Suspected septic arthritis:   - Ortho and ID consulted    - will resume IV Ceftriaxone 2gm QD   - per ortho, Aspiration performed at [address] [place] [address] demonstrates a WBC count of 140 2K which is consistent with a left knee periprosthetic infection.    - Patient require operative intervention in the form of an explant and static spacer placement. - plan for OR today for explant and static spacer placement  - cardiology consulted for cardiac clearance.  Recommend stress test for further risk stratification      2. DHRUV on CKD:   - renal function improved  - renally dose all meds  - avoid nephrotoxic agents     3. Hyponatremia:   - trending down this morning  - pt does have chronic hyponatremia  - will continue to monitor      4. NCNC Anemia:   - HGb stable     5. Type II DM:   - accu checks ac/hs with ISS  - continuie home lantus      6. COPD:  - no evidence of acute exacerbation     7.  HTN:   - elevated  - v/s per unit protocol   - continue home anti hypertensives  - losartan held due to DHRUV, will restart today   - started on norvasc, will increased dose     8.  CAD s/p CABG:  - on plavix, no acute ischemic changes on EKG     9. DVT prophylaxis:   - SC Heparin     Cat Roland MD  10/25/2021  7:31 AM

## 2021-10-25 NOTE — PROGRESS NOTES
Orthopedic Progress Note    Patient:  Jacob Johnson  YOB: 1950     80 y.o. female    Subjective:  Patient seen and examined at bedside  No complaints or concerns  No issue overnight  Pain controlled  Denies fever, HA, CP, SOB, N/V, dysuria    Vitals reviewed, afebrile    Objective:   Vitals:    10/25/21 0312   BP:    Pulse:    Resp: 18   Temp:    SpO2: 95%     Gen: NAD, cooperative      Cardiovascular: regular rate, no dependent edema     Respiratory: No acute respiratory distress     LLE: Edema throughout the leg. Erythema and warmth at the distal aspect of the tibia with associated TTP, with erythema spreading proximally and distally. Knee effusion noted; with limited ROM secondary to pain. Compartments soft. 2+ DP/PT pulse. TA/EHL/FHL/GS motor intact. Deep and Superficial Peroneal/Saphenous/Sural SILT.     Recent Labs     10/24/21  0455   WBC 9.4   HGB 8.7*   HCT 29.1*         K 5.0   BUN 30*   CREATININE 1.00*   GLUCOSE 163*      Chemical AC: Heparin, Plavix  ABx: Ceftriaxone, Ancef  See rec for complete list    Impression/plan: 80 y.o. female with a L TKA 14 years ago being seen for:     - Left knee periprosthetic joint infection    -OR today (10/25) with Dr. Ifrah Landis for explant/spacer placement  -WB status: WBAT LLE  -Please hold chemical AC for surgical intervention today  -Okay to continue chemical AC POD1  -NPO  -Follow-up cultures  -Patient consented and boarded  -Ice (20 min, 1 hour off) for edema/pain control  -Encourage deep breathing and incentive spirometry use  -Please page ortho with any questions    Alena Christine DO  Orthopedic Surgery Resident, PGY-1  [place] [address], [place]        PGY-2 Addendum    Patient seen and examined. Agree with above. 80 y.o. female being seen for a left knee periprosthetic joint infection. Patient overall feeling well aside from pain in left knee. Denies numbness/tingling in the LLE.  Pain with palpation along the knee and distal to the knee. Plan for OR today for explantation and antibiotic spacer. Please page ortho with questions.        Ata Teran DO, PGY-2  Orthopedic Surgery Resident  [phone] \[facility] [place] [facility]\""

## 2021-10-25 NOTE — PROGRESS NOTES
hours.    DATA:    Diagnostics:       EKG: NSR, NL ECG     STRESS ORDERED/PENDING      Objective:   Vitals: BP (!) 187/77   Pulse 97   Temp 98.2 °F (36.8 °C) (Oral)   Resp 18   Ht 5' 4\" (1.626 m)   Wt 298 lb (135.2 kg)   SpO2 94%   BMI 51.15 kg/m²   General appearance: alert and cooperative with exam  HEENT: Head: Normocephalic, no lesions, without obvious abnormality. Neck:no JVD, trachea midline, no adenopathy  Lungs: Clear to auscultation  NC oxygen 1.5-2 L  Heart: Regular rate and rhythm, s1/s2 auscultated, no murmurs. Not on tele. 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  · Hx of Lt TKA 2007  · Prior Lt knee infections x 2  · DM 2  · Essential HTN  · COPD     Patient Active Problem List:     Staphylococcal arthritis of left knee (HCC)     Type 2 diabetes mellitus without complication, without long-term current use of insulin (HCC)     PITA on CPAP     CAD (coronary artery disease)     COPD (chronic obstructive pulmonary disease) ([facility]     Primary hypertension     Acute kidney injury superimposed on CKD ([facility]     Normocytic normochromic anemia      Plan of Treatment:   1. Stress today for pre-op risk stratification. 2. HTN - remains elevated. Continue Norvasc & Losartan and will add Coreg 3.125mg PO BID.     Electronically signed by DINESH Hooper CNP on 10/25/2021 at 9:07 [address] [place] [address].  [phone]

## 2021-10-25 NOTE — PROGRESS NOTES
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 Scheduled explantation and antibiotic spacer device placement 10-25-21. Medical Decision Making/Summary/Discussion:10/25/2021       Infection Control Recommendations   [place] Precautions      Antimicrobial Stewardship Recommendations     Simplification of therapy    Coordination of Outpatient Care:   Estimated Length of IV antimicrobials:4 weeks  Patient will need Midline Catheter Insertion: Yes  Patient will need PICC line Insertion:No  Patient will need: Home IV , [place],  SNF,  LTAC: TBD  Patient will need outpatient wound care:Yes    Chief complaint/reason for consultation:   Lt knee pyogenic arthritis      History of Present Illness:   Tahira Villasenor is a 80y.o.-year-old female who was initially admitted on 10/21/2021. Patient seen at the request of . INITIAL HISTORY:    Patient with a Hx of prior Lt TKA  In 2007 at [facility] under Dr Dominguez Moura, She subsequently experienced  Lt knee infections which required additional surgeries x 2. She has underlying DM 2, Essential HTN, CKD,PITA and COPD. Presented to [facility] because of acute onset of pain with ambulation. The knee joint was aspirated and grew Strep. Agalactiae. She was started on antibiotics (ceftriaxone) and was awaiting evaluation at tertiary care center because of her Hx of prior surgeries and infections. [facility] refused transfer. She presented to [facility] because of ongoing severe pain, erythema and inflammation. Ortho has evaluated and plan intervention on 10-25-21.     CURRENT EVALUATION : 10/25/2021    Afebrile  VS stable    Had pre-op evaluation by Cardiology including stress test.  Presently in Cath lab. Knee explantation depending on Cath lab findings    Labs, X rays reviewed: 10/25/2021    BUN: 40  Cr: 1.57    WBC: 10.4  Hb:8.9  Plat: 207    Cultures:  Urine:    Blood:    Sputum :    Wound:  Knee aspirate: Strep agalactiae        Discussed with patient, RN, MEMO. I have personally reviewed the past medical history, past surgical history, medications, social history, and family history, and I have updated the database accordingly. Past Medical History:     Past Medical History:   Diagnosis Date    Arthritis     Diabetes mellitus ([facility] [address]     Hyperlipidemia     Hypertension        Past Surgical  History:     Past Surgical History:   Procedure Laterality Date    HYSTERECTOMY         Medications:      amLODIPine  10 mg Oral Daily    carvedilol  3.125 mg Oral BID WC    sodium chloride flush  5-40 mL IntraVENous 2 times per day    insulin glargine  30 Units SubCUTAneous Nightly    ceFAZolin  2,000 mg IntraVENous On Call to OR    [Held by provider] clopidogrel  75 mg Oral Daily    [Held by provider] fenofibrate  160 mg Oral Daily    losartan  100 mg Oral Daily    pantoprazole  40 mg Oral QAM AC    insulin lispro  0-12 Units SubCUTAneous TID WC    cefTRIAXone (ROCEPHIN) IV  2,000 mg IntraVENous Q24H    insulin lispro  0-6 Units SubCUTAneous Nightly    sodium chloride flush  5-40 mL IntraVENous 2 times per day    heparin (porcine)  5,000 Units SubCUTAneous 3 times per day       Social History:     Social History     Socioeconomic History    Marital status:       Spouse name: Not on file    Number of children: Not on file    Years of education: Not on file    Highest education level: Not on file   Occupational History    Not on file   Tobacco Use    Smoking status: Never Smoker   Substance and Sexual Activity    Alcohol use: No    Drug use: No    Sexual activity: Not on file   Other Topics Concern  Not on file   Social History Narrative    Not on file     Social Determinants of Health     Financial Resource Strain:     Difficulty of Paying Living Expenses:    Food Insecurity:     Worried About Running Out of Food in the Last Year:     [address] in the Last Year:    Transportation Needs:     Lack of Transportation (Medical):  Lack of Transportation (Non-Medical):    Physical Activity:     Days of Exercise per Week:     Minutes of Exercise per Session:    Stress:     Feeling of Stress :    Social Connections:     Frequency of Communication with Friends and Family:     Frequency of Social Gatherings with Friends and Family:     Attends [employer] Services:     Active Member of Clubs or Organizations:     Attends Club or Organization Meetings:     Marital Status:    Intimate Partner Violence:     Fear of Current or Ex-Partner:     Emotionally Abused:     Physically Abused:     Sexually Abused:        Family History:   No family history on file. Allergies:   Bactrim [sulfamethoxazole-trimethoprim], Codeine, and Lisinopril     Review of Systems:   Constitutional: No fevers or chills. No systemic complaints  Head: No headaches  Eyes: No double vision or blurry vision. No conjunctival inflammation. ENT: No sore throat or runny nose. . No hearing loss, tinnitus or vertigo. Cardiovascular: No chest pain or palpitations. No shortness of breath. No FRANCES  Lung: No shortness of breath or cough. No sputum production  Abdomen: No nausea, vomiting, diarrhea, or abdominal pain. Hermann Eunice No cramps. Genitourinary: No increased urinary frequency, or dysuria. No hematuria. No suprapubic or CVA pain  Musculoskeletal: No muscle aches or pains. No joint effusions, swelling or deformities. Lt knee effusion  Hematologic: No bleeding or bruising. Neurologic: No headache, weakness, numbness, or tingling. Integument: No rash, no ulcers. Lt knee erythema  Psychiatric: No depression.    Endocrine: No polyuria, no polydipsia, no polyphagia. Physical Examination :   No data found. General Appearance: Awake, alert, and in no apparent distress  Head:  Normocephalic, no trauma  Eyes: Pupils equal, round, reactive to light and accommodation; extraocular movements intact; sclera anicteric; conjunctivae pink. No embolic phenomena. ENT: Oropharynx clear, without erythema, exudate, or thrush. No tenderness of sinuses. Mouth/throat: mucosa pink and moist. No lesions. Dentition in good repair. Neck:Supple, without lymphadenopathy. Thyroid normal, No bruits. Pulmonary/Chest: Clear to auscultation, without wheezes, rales, or rhonchi. No dullness to percussion. Cardiovascular: Regular rate and rhythm without murmurs, rubs, or gallops. Abdomen: Soft, non tender. Bowel sounds normal. No organomegaly  All four Extremities: No cyanosis, clubbing, edema. Lt knee effusion and erythema. Neurologic: No gross sensory or motor deficits. Skin: Warm and dry with good turgor. No signs of peripheral arterial or venous insufficiency. No ulcerations. No open wounds. Medical Decision Making -Laboratory:   I have independently reviewed/ordered the following labs:    CBC with Differential:   Recent Labs     10/24/21  0455 10/25/21  0504   WBC 9.4 7.9   HGB 8.7* 8.9*   HCT 29.1* 29.8*    266   LYMPHOPCT 13* 18*   MONOPCT 6 7     BMP:   Recent Labs     10/24/21  0455 10/25/21  0504    128*   K 5.0 5.2    99   CO2 21 21   BUN 30* 23   CREATININE 1.00* 0.91*   MG 2.2  --      Hepatic Function Panel: No results for input(s): PROT, LABALBU, BILIDIR, IBILI, BILITOT, ALKPHOS, ALT, AST in the last 72 hours. No results for input(s): RPR in the last 72 hours. No results for input(s): HIV in the last 72 hours. No results for input(s): BC in the last 72 hours.   Lab Results   Component Value Date    MUCUS NOT REPORTED 10/22/2021    RBC 3.31 10/25/2021    TRICHOMONAS NOT REPORTED 10/22/2021    WBC 7.9 10/25/2021    YEAST NOT REPORTED 10/22/2021    TURBIDITY Clear 10/22/2021     Lab Results   Component Value Date    CREATININE 0.91 10/25/2021    GLUCOSE 130 10/25/2021       Medical Decision Making-Imaging:     EXAMINATION:   2 X-RAY VIEWS OF THE LEFT TIBIA AND FIBULA; 2 X-RAY VIEWS OF THE LEFT FEMUR. RULER WAS USED.       10/22/2021 9:46 am       COMPARISON:   Left knee radiographs dated 10/21/2021       HISTORY:   ORDERING SYSTEM PROVIDED HISTORY: leg length eval   TECHNOLOGIST PROVIDED HISTORY:   leg length eval   Reason for Exam: leg length eval; Best possible images at this time due to pt   condition . -JEK/GW       FINDINGS:   Left femur: Left femoral head is not visualized due to overlying soft tissue   attenuation.  The superior tip of the greater trochanter is used as a   landmark.  Femoral length from the superior tip of the greater trochanter to   the medial femoral condyle is approximately 39.2 cm.       Left total knee arthroplasty is in place.  No acute fracture of the left   femur.  No evidence of hardware loosening.       Left leg: Distance from the medial femoral condyle to the mid tibial plafond   is 36 cm, which includes the polyethylene liner in the knee arthroplasty. (Total leg length of approximately 75.2 cm from superior tip of greater   trochanter to mid tibial plafond).  The liner measures up to 2 cm in   thickness.       No acute fracture involving the tibia or fibula.  No periprosthetic lucency   in the proximal tibia.  Ankle mortise alignment is preserved.  Scattered   dystrophic calcifications in the leg.           Impression   1.  No acute osseous abnormality in the left femur or tibia/fibula.       2.  Left knee arthroplasty in place without evidence for hardware loosening.       3.  Leg length measurements provided above.  Please note limitations and   landmarks used.         CT extremity lower left with contrast    Result Date: 10/18/2021  History: Acute left lower leg and ankle redness Exam/Technique:  Thin axial images through the left lower extremity were obtained from the superior aspect of the acetabulum to the left foot following the intravenous demonstration of 100 mL Omnipaque 300. Study was supplemented by sagittal and coronal reconstructed images. Comparison: None Findings: There is a left knee arthroplasty in place. Full evaluation of the area of the knee is compromised by extensive metallic artifact. There is a joint effusion of the left knee with fluid seen in the supra patellar bursa. There is no evidence for an acute osseous abnormality. No lytic or blastic processes are seen. No evidence of osteolysis demonstrated. No soft tissue masses or fluid collections are identified. IMPRESSION: 1. Left knee joint effusion with a left knee arthroplasty in place. 2. Otherwise normal CT of the left lower extremity. Workstation:[id number] Finalized by Shelton Morales MD on 10/18/2021     Medical Decision Ptlisx-Cyfqygqe-Nekhg:   Microbiology Results   Procedure Component Value Units Date/Time   Body fluid culture includes gram stain [[id number]] (Abnormal) Collected: 10/18/21 1210   Specimen: Fluid Updated: 10/19/21 1252   Gram Stain Result WHITE BLOOD CELLS PRESENT   FEW GRAM POSITIVE COCCI   QUANTITY NOT SUFFICIENT TO CONCENTRATE INTERPRET RESULTS WITH CAUTION   A Negative report does not exclude the possibility of infection because results are dependent on adequate specimen collection. Culture RARE STREPTOCOCCUS AGALACTIAE (GROUP B)   SARS COV 2 (COVID-19) [facility] ID Onsite Test [[id number]] Collected: 10/18/21 0253   Specimen: Nasopharynx Updated: 10/18/21 0335   Specimen Naso Pharynx   Sent to Testing to be performed at [address] [place] [address]. COVID-19 [facility] Report to Follow. SARS CoV 2 [[id number]] Collected: 10/18/21 0253   Specimen: Nasopharynx Updated: 10/18/21 0336   First Test? UNKNOWN   Employed in Healthcare? UNKNOWN   Symptoms Defined by CDC? NO   Symptom Onset? U^UNKNOWN   Hospitalized for Covid?  UNKNOWN ICU for Covid? UNKNOWN   Congregate Setting? UNKNOWN   Pregnant? NO   Specimen Type Naso Pharynx   SARS COV 2 Presumptive Negative     Medical Decision Making-Other:     Note:  Labs, medications, radiologic studies were reviewed with personal review of films  Moderate Large amounts of data were reviewed  Discussed with nursing Staff, Discharge planner  Infection Control and Prevention measures reviewed  All prior entries were reviewed  Administer medications as ordered  Prognosis: Guarded  Discharge planning reviewed  Follow up as outpatient. Thank you for allowing us to participate in the care of this patient. Please call with questions.     Hue Singh MD  Pager: [phone] - Office: [phone]

## 2021-10-25 NOTE — OP NOTE
[facility]    CARDIAC CATHETERIZATION    Date:   10/25/2021  Patient name:  Daniela Augustine  Date of admission:  10/21/2021 10:51 PM  MRN:   [id number]  YOB: 1950    Operators:  Primary:   Laila Williamson MD (Attending Physician)    Assistant/CV fellow: Brad Verdin MD      Procedure performed:     [x] Left Heart Catheterization. [] Graft Angiography. [x] Left Ventriculography. [] Right Heart Catheterization. [x] Coronary Angiography. [] Aortic Valve Studies. [] PCI:      [] Other:         Indication:  [] STEMI      [x] + Stress test  [] ACS      [] + EKG Changes  [] Non Q MI       [] Significant Risk Factors  [] Recurrent Angina             [] Diabetes Mellitus    [] New LBBB      [] Other.  [] CHF / Low EF changes     [] Abnormal CTA / Ca Score      Procedure:  Access:  [] Femoral  [x] Radial  artery       [x] Right  [] Left    Procedure: After informed consent was obtained with explanation of the risks and benefits, patient was brought to the cath lab. The access area was prepped and draped in sterile fashion. 1% lidocaine was used for local block. The artery was cannulated with 6  Fr sheath with brisk arterial blood return. The side port was frequently flushed and aspirated with normal saline. Estimated Blood Loss:  [x] Minimal < 25 cc [] Moderate 25-50 cc  [] >50 cc    Findings:    LMCA: Normal 0% stenosis. LAD: Normal 0% stenosis. LCx: Normal 0% stenosis. RCA: Normal 0% stenosis. Coronary Tree Dominance: Right       LV Analysis LV function assessed as:Normal.   The LV gram was performed in the PARADA 30 position. LVEF: 55%. Conclusions:   Normal coronary arteries    Preserved LV systolic function        Recommendations        Medical treatments    Risk factor modification.   ____________________________________________________________________    History and Risk Factors    [x] Hypertension     [] Family history of CAD  [] Hyperlipidemia     [] Cerebrovascular Disease   [] Prior MI       [] Peripheral Vascular disease   [] Prior PCI              [] Diabetes Mellitus    [] Left Main PCI. [] Currently on Dialysis. [] Prior CABG. [] Currently smoker. [] Cardiac Arrest outside of healthcare facility. [] Yes    [x] No        Witnessed     [] Yes   [] No     Arrest after arrival of EMS  [] Yes   [] No     [] Cardiac Arrest at other Facility. [] Yes   [x] No    Pre-Procedure Information. Heart Failure       [] Yes    [x] No        Class  [] I      [] II  [] III    [] IV. New Diagnosis    [] Yes  [] No    HF Type      [] Systolic   [] Diastolic          [] Unknown. Diagnostic Test:   EKG       [] Normal   [x] Abnormal    New antiarrhythmia medications:    [] Yes   [] No   New onset atrial fibrillation / Flutter     [] Yes   [] No   ECG Abnormalities:      [] V. Fib   [] Ramonita V. Tach           [] NS V. T   [] New LBBB           [] T. Inv  []  ST dev > 0.5 mm         [] PVC's freq  [] PVC's infrequent    Stress Test Performed:      [x] Yes    [] No     Type:     [] Stress Echo   [] Exercise Stress Test (no imaging)      [x] Stress Nuclear  [] Stress Imaging     Results   [] Negative   [x] Positive        [] Indeterminate  [] Unavailable     If Positive/ Risk / Extent of Ischemia:       [] Low  [] Intermediate         [] High  [] Unavailable      Cardiac CTA Performed:     [] Yes    [x] No      Results   [] CAD   [] Non obstructive CAD      [] No CAD   [] Uncertain      [] Unknown   [] Structural Disease.      Pre Procedure Medications:   [x] Yes    [] No         [x] ASA   [] Beta Blockers      [] Nitrate   [] Ca Channel Blockers      [] Ranolazine   [] Statin       [] Plavix/Others antiplatelets      Electronically signed on 10/25/2021 at 4:35 PM by:    Yvan Anderson MD  Fellow, [address]      I have reviewed the case / procedure with resident / fellow  I have examined the patient personally  Patient agree with treatment plan as discussed before, final arrangement based on my evaluation and exam.    Risk and benefit of procedure planned were explained in details. Procedure was performed by me personally, with all aspect of the procedure being done using standard protocol. Note was modified based on my own assessment and treatment.     Jo-Ann Storm MD  [facility]

## 2021-10-25 NOTE — PROCEDURES
[address]                  [facility]                              CARDIAC STRESS TEST    PATIENT NAME: Alcon Mcconnell                     :        1950  MED REC NO:   [id number]                             ROOM:       3311  ACCOUNT NO:   [de-identified]                           ADMIT DATE: 10/21/2021  PROVIDER:     Jah Escobar    DATE OF STUDY:  10/25/2021    ORDERING PROVIDER:  Jose Cesar CNP  PRIMARY CARE PROVIDER:  Gisella Souza DO  INTERPRETING PHYSICIAN:  Jah Escobar MD    LEXISCAN STRESS STUDY:  Indication:  Pre-op, knee surgery    Procedure was explained and consent was given    Medications:  Lexiscan, 0.4 mg; and Aminophylline, 50 mg. in recovery  Resting heart rate:  90 bpm  Resting blood pressure:  154/71 mm/Hg  Infusion heart rate:  102 bpm  Infusion blood pressure: 164/68 mm/Hg  Resting EKG:  Abnormal (nonspecific change)  Stress heart response:  Normal  Stress BP response:  Appropriate  Chest discomfort:  None  Ischemic EKG changes:  None    IMPRESSION:  Electrocardiographically negative Lexiscan stress study. Radio-isotope  results to follow from the department of Nuclear Medicine.           Monica Burden    D: 10/25/2021 13:57:27       T: 10/25/2021 13:58:37     AK/RILEY  Job#: [id number]     Doc#: Unknown    CC:    ()

## 2021-10-25 NOTE — CARE COORDINATION
Transition planning  Spoke with Vanessa from [facility], she states they did not receive referral and do not have access to [address]. Faxed face sheet, H&P, clinicals and MAR to [phone] as requested. OR planned for tomorrow with Dr. Garcia Ibrahim for placement of antibiotic spacer.

## 2021-10-25 NOTE — PLAN OF CARE
Stress test reviewed as below with Dr. Alex Flannery       Perfusion:  Stress-induced reversible defects as above involving 12% of the   left ventricular myocardium at stress.       Function:  Normal left ventricular ejection fraction of 55%.  Mild perfusion   defect in the apex and mid inferior wall.       Risk stratification: HIGH DUE TO PERFUSION DEFECTS. Recommend cardiac cath for further evaluation. Reviewed in detail with patient. I have discussed risks (including but not limited to vascular injury, infection, hematoma, contrast induced kidney dysfunction, CVA and MI), benefits, alternatives in detail. All questions answered. Patient agrees to proceed. has been NPO so will add on for today. Discussed with RN.  Ortho resident updated via [address], APRN - CNP

## 2021-10-26 ENCOUNTER — APPOINTMENT (OUTPATIENT)
Dept: GENERAL RADIOLOGY | Age: 71
DRG: 464 | End: 2021-10-26
Attending: STUDENT IN AN ORGANIZED HEALTH CARE EDUCATION/TRAINING PROGRAM
Payer: COMMERCIAL

## 2021-10-26 ENCOUNTER — ANESTHESIA EVENT (OUTPATIENT)
Dept: OPERATING ROOM | Age: 71
DRG: 464 | End: 2021-10-26
Payer: COMMERCIAL

## 2021-10-26 ENCOUNTER — ANESTHESIA (OUTPATIENT)
Dept: OPERATING ROOM | Age: 71
DRG: 464 | End: 2021-10-26
Payer: COMMERCIAL

## 2021-10-26 VITALS — TEMPERATURE: 98.1 F | DIASTOLIC BLOOD PRESSURE: 109 MMHG | SYSTOLIC BLOOD PRESSURE: 142 MMHG | OXYGEN SATURATION: 97 %

## 2021-10-26 LAB
ABSOLUTE EOS #: 0.2 K/UL (ref 0–0.44)
ABSOLUTE IMMATURE GRANULOCYTE: 0.23 K/UL (ref 0–0.3)
ABSOLUTE LYMPH #: 1.44 K/UL (ref 1.1–3.7)
ABSOLUTE MONO #: 0.58 K/UL (ref 0.1–1.2)
ANION GAP SERPL CALCULATED.3IONS-SCNC: 10 MMOL/L (ref 9–17)
BASOPHILS # BLD: 1 % (ref 0–2)
BASOPHILS ABSOLUTE: 0.05 K/UL (ref 0–0.2)
BUN BLDV-MCNC: 22 MG/DL (ref 8–23)
BUN/CREAT BLD: ABNORMAL (ref 9–20)
C-REACTIVE PROTEIN: 141.8 MG/L (ref 0–5)
CALCIUM SERPL-MCNC: 8.4 MG/DL (ref 8.6–10.4)
CHLORIDE BLD-SCNC: 104 MMOL/L (ref 98–107)
CO2: 21 MMOL/L (ref 20–31)
CREAT SERPL-MCNC: 0.78 MG/DL (ref 0.5–0.9)
DIFFERENTIAL TYPE: ABNORMAL
EOSINOPHILS RELATIVE PERCENT: 3 % (ref 1–4)
GFR AFRICAN AMERICAN: >60 ML/MIN
GFR NON-AFRICAN AMERICAN: >60 ML/MIN
GFR SERPL CREATININE-BSD FRML MDRD: ABNORMAL ML/MIN/{1.73_M2}
GFR SERPL CREATININE-BSD FRML MDRD: ABNORMAL ML/MIN/{1.73_M2}
GLUCOSE BLD-MCNC: 107 MG/DL (ref 65–105)
GLUCOSE BLD-MCNC: 114 MG/DL (ref 70–99)
GLUCOSE BLD-MCNC: 132 MG/DL (ref 65–105)
GLUCOSE BLD-MCNC: 151 MG/DL (ref 65–105)
GLUCOSE BLD-MCNC: 98 MG/DL (ref 65–105)
GLUCOSE BLD-MCNC: 99 MG/DL (ref 65–105)
HCT VFR BLD CALC: 29.1 % (ref 36.3–47.1)
HEMOGLOBIN: 8.5 G/DL (ref 11.9–15.1)
IMMATURE GRANULOCYTES: 3 %
LYMPHOCYTES # BLD: 19 % (ref 24–43)
MCH RBC QN AUTO: 27 PG (ref 25.2–33.5)
MCHC RBC AUTO-ENTMCNC: 29.2 G/DL (ref 28.4–34.8)
MCV RBC AUTO: 92.4 FL (ref 82.6–102.9)
MONOCYTES # BLD: 8 % (ref 3–12)
NRBC AUTOMATED: 0 PER 100 WBC
PDW BLD-RTO: 14.7 % (ref 11.8–14.4)
PLATELET # BLD: 278 K/UL (ref 138–453)
PLATELET ESTIMATE: ABNORMAL
PMV BLD AUTO: 9.8 FL (ref 8.1–13.5)
POTASSIUM SERPL-SCNC: 4.8 MMOL/L (ref 3.7–5.3)
RBC # BLD: 3.15 M/UL (ref 3.95–5.11)
RBC # BLD: ABNORMAL 10*6/UL
SEG NEUTROPHILS: 66 % (ref 36–65)
SEGMENTED NEUTROPHILS ABSOLUTE COUNT: 5.26 K/UL (ref 1.5–8.1)
SODIUM BLD-SCNC: 135 MMOL/L (ref 135–144)
WBC # BLD: 7.8 K/UL (ref 3.5–11.3)
WBC # BLD: ABNORMAL 10*3/UL

## 2021-10-26 PROCEDURE — 0SBD0ZZ EXCISION OF LEFT KNEE JOINT, OPEN APPROACH: ICD-10-PCS | Performed by: ORTHOPAEDIC SURGERY

## 2021-10-26 PROCEDURE — 6360000002 HC RX W HCPCS: Performed by: NURSE PRACTITIONER

## 2021-10-26 PROCEDURE — 2720000010 HC SURG SUPPLY STERILE: Performed by: ORTHOPAEDIC SURGERY

## 2021-10-26 PROCEDURE — 6370000000 HC RX 637 (ALT 250 FOR IP): Performed by: NURSE PRACTITIONER

## 2021-10-26 PROCEDURE — 2580000003 HC RX 258: Performed by: NURSE PRACTITIONER

## 2021-10-26 PROCEDURE — 86140 C-REACTIVE PROTEIN: CPT

## 2021-10-26 PROCEDURE — 3600000014 HC SURGERY LEVEL 4 ADDTL 15MIN: Performed by: ORTHOPAEDIC SURGERY

## 2021-10-26 PROCEDURE — 2500000003 HC RX 250 WO HCPCS: Performed by: ANESTHESIOLOGY

## 2021-10-26 PROCEDURE — 2580000003 HC RX 258: Performed by: ORTHOPAEDIC SURGERY

## 2021-10-26 PROCEDURE — 2580000003 HC RX 258: Performed by: STUDENT IN AN ORGANIZED HEALTH CARE EDUCATION/TRAINING PROGRAM

## 2021-10-26 PROCEDURE — 6370000000 HC RX 637 (ALT 250 FOR IP)

## 2021-10-26 PROCEDURE — 64447 NJX AA&/STRD FEMORAL NRV IMG: CPT | Performed by: ANESTHESIOLOGY

## 2021-10-26 PROCEDURE — 6360000002 HC RX W HCPCS

## 2021-10-26 PROCEDURE — 3600000004 HC SURGERY LEVEL 4 BASE: Performed by: ORTHOPAEDIC SURGERY

## 2021-10-26 PROCEDURE — 0SPD0JZ REMOVAL OF SYNTHETIC SUBSTITUTE FROM LEFT KNEE JOINT, OPEN APPROACH: ICD-10-PCS | Performed by: ORTHOPAEDIC SURGERY

## 2021-10-26 PROCEDURE — 6370000000 HC RX 637 (ALT 250 FOR IP): Performed by: FAMILY MEDICINE

## 2021-10-26 PROCEDURE — 99232 SBSQ HOSP IP/OBS MODERATE 35: CPT | Performed by: INTERNAL MEDICINE

## 2021-10-26 PROCEDURE — 80048 BASIC METABOLIC PNL TOTAL CA: CPT

## 2021-10-26 PROCEDURE — 0SHD08Z INSERTION OF SPACER INTO LEFT KNEE JOINT, OPEN APPROACH: ICD-10-PCS | Performed by: ORTHOPAEDIC SURGERY

## 2021-10-26 PROCEDURE — 6360000002 HC RX W HCPCS: Performed by: ANESTHESIOLOGY

## 2021-10-26 PROCEDURE — 73560 X-RAY EXAM OF KNEE 1 OR 2: CPT

## 2021-10-26 PROCEDURE — 2500000003 HC RX 250 WO HCPCS: Performed by: NURSE ANESTHETIST, CERTIFIED REGISTERED

## 2021-10-26 PROCEDURE — 2709999900 HC NON-CHARGEABLE SUPPLY: Performed by: ORTHOPAEDIC SURGERY

## 2021-10-26 PROCEDURE — 73552 X-RAY EXAM OF FEMUR 2/>: CPT

## 2021-10-26 PROCEDURE — 3700000000 HC ANESTHESIA ATTENDED CARE: Performed by: ORTHOPAEDIC SURGERY

## 2021-10-26 PROCEDURE — 3700000001 HC ADD 15 MINUTES (ANESTHESIA): Performed by: ORTHOPAEDIC SURGERY

## 2021-10-26 PROCEDURE — 6360000002 HC RX W HCPCS: Performed by: NURSE ANESTHETIST, CERTIFIED REGISTERED

## 2021-10-26 PROCEDURE — 0SGD04Z FUSION OF LEFT KNEE JOINT WITH INTERNAL FIXATION DEVICE, OPEN APPROACH: ICD-10-PCS | Performed by: ORTHOPAEDIC SURGERY

## 2021-10-26 PROCEDURE — C1776 JOINT DEVICE (IMPLANTABLE): HCPCS | Performed by: ORTHOPAEDIC SURGERY

## 2021-10-26 PROCEDURE — 82947 ASSAY GLUCOSE BLOOD QUANT: CPT

## 2021-10-26 PROCEDURE — 73590 X-RAY EXAM OF LOWER LEG: CPT

## 2021-10-26 PROCEDURE — 27488 REMOVAL OF KNEE PROSTHESIS: CPT | Performed by: ORTHOPAEDIC SURGERY

## 2021-10-26 PROCEDURE — C1713 ANCHOR/SCREW BN/BN,TIS/BN: HCPCS | Performed by: ORTHOPAEDIC SURGERY

## 2021-10-26 PROCEDURE — 6360000002 HC RX W HCPCS: Performed by: ORTHOPAEDIC SURGERY

## 2021-10-26 PROCEDURE — 7100000000 HC PACU RECOVERY - FIRST 15 MIN: Performed by: ORTHOPAEDIC SURGERY

## 2021-10-26 PROCEDURE — 7100000001 HC PACU RECOVERY - ADDTL 15 MIN: Performed by: ORTHOPAEDIC SURGERY

## 2021-10-26 PROCEDURE — 85025 COMPLETE CBC W/AUTO DIFF WBC: CPT

## 2021-10-26 PROCEDURE — 1200000000 HC SEMI PRIVATE

## 2021-10-26 PROCEDURE — 2580000003 HC RX 258: Performed by: INTERNAL MEDICINE

## 2021-10-26 PROCEDURE — 36415 COLL VENOUS BLD VENIPUNCTURE: CPT

## 2021-10-26 DEVICE — IMPLANTABLE DEVICE: Type: IMPLANTABLE DEVICE | Site: KNEE | Status: FUNCTIONAL

## 2021-10-26 DEVICE — CEMENT BONE 40GM HI VISC PALACOS R: Type: IMPLANTABLE DEVICE | Site: KNEE | Status: FUNCTIONAL

## 2021-10-26 RX ORDER — FENTANYL CITRATE 50 UG/ML
100 INJECTION, SOLUTION INTRAMUSCULAR; INTRAVENOUS ONCE
Status: COMPLETED | OUTPATIENT
Start: 2021-10-26 | End: 2021-10-26

## 2021-10-26 RX ORDER — NEOSTIGMINE METHYLSULFATE 5 MG/5 ML
SYRINGE (ML) INTRAVENOUS PRN
Status: DISCONTINUED | OUTPATIENT
Start: 2021-10-26 | End: 2021-10-26 | Stop reason: SDUPTHER

## 2021-10-26 RX ORDER — FENTANYL CITRATE 50 UG/ML
INJECTION, SOLUTION INTRAMUSCULAR; INTRAVENOUS PRN
Status: DISCONTINUED | OUTPATIENT
Start: 2021-10-26 | End: 2021-10-26 | Stop reason: SDUPTHER

## 2021-10-26 RX ORDER — DEXAMETHASONE SODIUM PHOSPHATE 10 MG/ML
INJECTION INTRAMUSCULAR; INTRAVENOUS PRN
Status: DISCONTINUED | OUTPATIENT
Start: 2021-10-26 | End: 2021-10-26 | Stop reason: SDUPTHER

## 2021-10-26 RX ORDER — TRANEXAMIC ACID 10 MG/ML
INJECTION, SOLUTION INTRAVENOUS PRN
Status: DISCONTINUED | OUTPATIENT
Start: 2021-10-26 | End: 2021-10-26 | Stop reason: SDUPTHER

## 2021-10-26 RX ORDER — BUPIVACAINE HYDROCHLORIDE 5 MG/ML
INJECTION, SOLUTION EPIDURAL; INTRACAUDAL
Status: COMPLETED | OUTPATIENT
Start: 2021-10-26 | End: 2021-10-26

## 2021-10-26 RX ORDER — LIDOCAINE HYDROCHLORIDE 10 MG/ML
INJECTION, SOLUTION EPIDURAL; INFILTRATION; INTRACAUDAL; PERINEURAL PRN
Status: DISCONTINUED | OUTPATIENT
Start: 2021-10-26 | End: 2021-10-26 | Stop reason: SDUPTHER

## 2021-10-26 RX ORDER — VANCOMYCIN HYDROCHLORIDE 1 G/20ML
INJECTION, POWDER, LYOPHILIZED, FOR SOLUTION INTRAVENOUS PRN
Status: DISCONTINUED | OUTPATIENT
Start: 2021-10-26 | End: 2021-10-26 | Stop reason: HOSPADM

## 2021-10-26 RX ORDER — MAGNESIUM HYDROXIDE 1200 MG/15ML
LIQUID ORAL CONTINUOUS PRN
Status: COMPLETED | OUTPATIENT
Start: 2021-10-26 | End: 2021-10-26

## 2021-10-26 RX ORDER — PROPOFOL 10 MG/ML
INJECTION, EMULSION INTRAVENOUS PRN
Status: DISCONTINUED | OUTPATIENT
Start: 2021-10-26 | End: 2021-10-26 | Stop reason: SDUPTHER

## 2021-10-26 RX ORDER — ONDANSETRON 2 MG/ML
INJECTION INTRAMUSCULAR; INTRAVENOUS PRN
Status: DISCONTINUED | OUTPATIENT
Start: 2021-10-26 | End: 2021-10-26 | Stop reason: SDUPTHER

## 2021-10-26 RX ORDER — ROCURONIUM BROMIDE 10 MG/ML
INJECTION, SOLUTION INTRAVENOUS PRN
Status: DISCONTINUED | OUTPATIENT
Start: 2021-10-26 | End: 2021-10-26 | Stop reason: SDUPTHER

## 2021-10-26 RX ORDER — TOBRAMYCIN 1.2 G/30ML
INJECTION, POWDER, LYOPHILIZED, FOR SOLUTION INTRAVENOUS PRN
Status: DISCONTINUED | OUTPATIENT
Start: 2021-10-26 | End: 2021-10-26 | Stop reason: HOSPADM

## 2021-10-26 RX ORDER — MIDAZOLAM HYDROCHLORIDE 2 MG/2ML
2 INJECTION, SOLUTION INTRAMUSCULAR; INTRAVENOUS ONCE
Status: COMPLETED | OUTPATIENT
Start: 2021-10-26 | End: 2021-10-26

## 2021-10-26 RX ORDER — MIDAZOLAM HYDROCHLORIDE 1 MG/ML
INJECTION INTRAMUSCULAR; INTRAVENOUS
Status: COMPLETED
Start: 2021-10-26 | End: 2021-10-26

## 2021-10-26 RX ORDER — GLYCOPYRROLATE 1 MG/5 ML
SYRINGE (ML) INTRAVENOUS PRN
Status: DISCONTINUED | OUTPATIENT
Start: 2021-10-26 | End: 2021-10-26 | Stop reason: SDUPTHER

## 2021-10-26 RX ADMIN — MIDAZOLAM HYDROCHLORIDE 2 MG: 1 INJECTION, SOLUTION INTRAMUSCULAR; INTRAVENOUS at 14:08

## 2021-10-26 RX ADMIN — CARVEDILOL 3.12 MG: 3.12 TABLET, FILM COATED ORAL at 09:18

## 2021-10-26 RX ADMIN — SODIUM CHLORIDE: 9 INJECTION, SOLUTION INTRAVENOUS at 17:02

## 2021-10-26 RX ADMIN — BUPIVACAINE HYDROCHLORIDE 10 ML: 5 INJECTION, SOLUTION EPIDURAL; INTRACAUDAL; PERINEURAL at 14:13

## 2021-10-26 RX ADMIN — HYDROMORPHONE HYDROCHLORIDE 0.5 MG: 1 INJECTION, SOLUTION INTRAMUSCULAR; INTRAVENOUS; SUBCUTANEOUS at 20:22

## 2021-10-26 RX ADMIN — Medication 0.6 MG: at 19:14

## 2021-10-26 RX ADMIN — AMLODIPINE BESYLATE 10 MG: 10 TABLET ORAL at 09:21

## 2021-10-26 RX ADMIN — LOSARTAN POTASSIUM 100 MG: 50 TABLET, FILM COATED ORAL at 09:18

## 2021-10-26 RX ADMIN — LIDOCAINE HYDROCHLORIDE 50 MG: 10 INJECTION, SOLUTION EPIDURAL; INFILTRATION; INTRACAUDAL; PERINEURAL at 17:09

## 2021-10-26 RX ADMIN — HYDROMORPHONE HYDROCHLORIDE 0.5 MG: 1 INJECTION, SOLUTION INTRAMUSCULAR; INTRAVENOUS; SUBCUTANEOUS at 19:55

## 2021-10-26 RX ADMIN — ROCURONIUM BROMIDE 50 MG: 10 INJECTION INTRAVENOUS at 17:09

## 2021-10-26 RX ADMIN — HYDROMORPHONE HYDROCHLORIDE 0.5 MG: 1 INJECTION, SOLUTION INTRAMUSCULAR; INTRAVENOUS; SUBCUTANEOUS at 20:27

## 2021-10-26 RX ADMIN — TRANEXAMIC ACID 1000 MG: 10 INJECTION, SOLUTION INTRAVENOUS at 17:21

## 2021-10-26 RX ADMIN — SODIUM CHLORIDE, PRESERVATIVE FREE 10 ML: 5 INJECTION INTRAVENOUS at 09:30

## 2021-10-26 RX ADMIN — ONDANSETRON 4 MG: 2 INJECTION, SOLUTION INTRAMUSCULAR; INTRAVENOUS at 19:12

## 2021-10-26 RX ADMIN — FENTANYL CITRATE 50 MCG: 50 INJECTION INTRAMUSCULAR; INTRAVENOUS at 14:11

## 2021-10-26 RX ADMIN — DEXAMETHASONE SODIUM PHOSPHATE 4 MG: 10 INJECTION INTRAMUSCULAR; INTRAVENOUS at 17:23

## 2021-10-26 RX ADMIN — MIDAZOLAM HYDROCHLORIDE 2 MG: 2 INJECTION, SOLUTION INTRAMUSCULAR; INTRAVENOUS at 14:08

## 2021-10-26 RX ADMIN — INSULIN LISPRO 1 UNITS: 100 INJECTION, SOLUTION INTRAVENOUS; SUBCUTANEOUS at 23:56

## 2021-10-26 RX ADMIN — PROPOFOL 180 MG: 10 INJECTION, EMULSION INTRAVENOUS at 17:09

## 2021-10-26 RX ADMIN — SODIUM CHLORIDE, PRESERVATIVE FREE 10 ML: 5 INJECTION INTRAVENOUS at 09:31

## 2021-10-26 RX ADMIN — HYDROMORPHONE HYDROCHLORIDE 0.5 MG: 1 INJECTION, SOLUTION INTRAMUSCULAR; INTRAVENOUS; SUBCUTANEOUS at 20:04

## 2021-10-26 RX ADMIN — Medication 4 MG: at 19:14

## 2021-10-26 RX ADMIN — CEFTRIAXONE SODIUM 2000 MG: 2 INJECTION, POWDER, FOR SOLUTION INTRAMUSCULAR; INTRAVENOUS at 09:19

## 2021-10-26 RX ADMIN — SODIUM CHLORIDE: 9 INJECTION, SOLUTION INTRAVENOUS at 18:15

## 2021-10-26 RX ADMIN — INSULIN GLARGINE 30 UNITS: 100 INJECTION, SOLUTION SUBCUTANEOUS at 23:56

## 2021-10-26 RX ADMIN — TRANEXAMIC ACID 1000 MG: 10 INJECTION, SOLUTION INTRAVENOUS at 19:10

## 2021-10-26 RX ADMIN — FENTANYL CITRATE 100 MCG: 50 INJECTION, SOLUTION INTRAMUSCULAR; INTRAVENOUS at 17:09

## 2021-10-26 RX ADMIN — Medication 30 ML: at 14:12

## 2021-10-26 RX ADMIN — OXYCODONE HYDROCHLORIDE AND ACETAMINOPHEN 1 TABLET: 5; 325 TABLET ORAL at 22:24

## 2021-10-26 RX ADMIN — OXYCODONE HYDROCHLORIDE AND ACETAMINOPHEN 1 TABLET: 5; 325 TABLET ORAL at 05:07

## 2021-10-26 ASSESSMENT — PULMONARY FUNCTION TESTS
PIF_VALUE: 20
PIF_VALUE: 30
PIF_VALUE: 31
PIF_VALUE: 29
PIF_VALUE: 26
PIF_VALUE: 0
PIF_VALUE: 29
PIF_VALUE: 27
PIF_VALUE: 1
PIF_VALUE: 30
PIF_VALUE: 32
PIF_VALUE: 25
PIF_VALUE: 33
PIF_VALUE: 29
PIF_VALUE: 28
PIF_VALUE: 32
PIF_VALUE: 30
PIF_VALUE: 30
PIF_VALUE: 21
PIF_VALUE: 32
PIF_VALUE: 31
PIF_VALUE: 6
PIF_VALUE: 33
PIF_VALUE: 32
PIF_VALUE: 30
PIF_VALUE: 29
PIF_VALUE: 33
PIF_VALUE: 29
PIF_VALUE: 21
PIF_VALUE: 31
PIF_VALUE: 29
PIF_VALUE: 33
PIF_VALUE: 28
PIF_VALUE: 31
PIF_VALUE: 27
PIF_VALUE: 31
PIF_VALUE: 30
PIF_VALUE: 28
PIF_VALUE: 20
PIF_VALUE: 32
PIF_VALUE: 29
PIF_VALUE: 18
PIF_VALUE: 32
PIF_VALUE: 2
PIF_VALUE: 31
PIF_VALUE: 3
PIF_VALUE: 24
PIF_VALUE: 32
PIF_VALUE: 30
PIF_VALUE: 28
PIF_VALUE: 20
PIF_VALUE: 31
PIF_VALUE: 32
PIF_VALUE: 31
PIF_VALUE: 32
PIF_VALUE: 30
PIF_VALUE: 28
PIF_VALUE: 31
PIF_VALUE: 23
PIF_VALUE: 30
PIF_VALUE: 18
PIF_VALUE: 32
PIF_VALUE: 31
PIF_VALUE: 28
PIF_VALUE: 29
PIF_VALUE: 27
PIF_VALUE: 20
PIF_VALUE: 29
PIF_VALUE: 29
PIF_VALUE: 27
PIF_VALUE: 32
PIF_VALUE: 20
PIF_VALUE: 33
PIF_VALUE: 33
PIF_VALUE: 32
PIF_VALUE: 33
PIF_VALUE: 20
PIF_VALUE: 1
PIF_VALUE: 6
PIF_VALUE: 28
PIF_VALUE: 20
PIF_VALUE: 20
PIF_VALUE: 30
PIF_VALUE: 32
PIF_VALUE: 20
PIF_VALUE: 26
PIF_VALUE: 5
PIF_VALUE: 32
PIF_VALUE: 26
PIF_VALUE: 27
PIF_VALUE: 28
PIF_VALUE: 20
PIF_VALUE: 27
PIF_VALUE: 0
PIF_VALUE: 30
PIF_VALUE: 29
PIF_VALUE: 29
PIF_VALUE: 33
PIF_VALUE: 27
PIF_VALUE: 20
PIF_VALUE: 29
PIF_VALUE: 20
PIF_VALUE: 31
PIF_VALUE: 26
PIF_VALUE: 3
PIF_VALUE: 20
PIF_VALUE: 29
PIF_VALUE: 35
PIF_VALUE: 27
PIF_VALUE: 33
PIF_VALUE: 35
PIF_VALUE: 19
PIF_VALUE: 31
PIF_VALUE: 20
PIF_VALUE: 27
PIF_VALUE: 30
PIF_VALUE: 9
PIF_VALUE: 30
PIF_VALUE: 18
PIF_VALUE: 29
PIF_VALUE: 30
PIF_VALUE: 32
PIF_VALUE: 29
PIF_VALUE: 27
PIF_VALUE: 29
PIF_VALUE: 30
PIF_VALUE: 26
PIF_VALUE: 32
PIF_VALUE: 33
PIF_VALUE: 31
PIF_VALUE: 28
PIF_VALUE: 1
PIF_VALUE: 27
PIF_VALUE: 28
PIF_VALUE: 30
PIF_VALUE: 31
PIF_VALUE: 28
PIF_VALUE: 32
PIF_VALUE: 33
PIF_VALUE: 29
PIF_VALUE: 25
PIF_VALUE: 30
PIF_VALUE: 32
PIF_VALUE: 28
PIF_VALUE: 23
PIF_VALUE: 31
PIF_VALUE: 30
PIF_VALUE: 29
PIF_VALUE: 27
PIF_VALUE: 32
PIF_VALUE: 1
PIF_VALUE: 41
PIF_VALUE: 32

## 2021-10-26 ASSESSMENT — PAIN SCALES - GENERAL
PAINLEVEL_OUTOF10: 10
PAINLEVEL_OUTOF10: 10
PAINLEVEL_OUTOF10: 3
PAINLEVEL_OUTOF10: 10
PAINLEVEL_OUTOF10: 9
PAINLEVEL_OUTOF10: 4
PAINLEVEL_OUTOF10: 10
PAINLEVEL_OUTOF10: 9
PAINLEVEL_OUTOF10: 7
PAINLEVEL_OUTOF10: 10

## 2021-10-26 ASSESSMENT — PAIN DESCRIPTION - ORIENTATION: ORIENTATION: LEFT

## 2021-10-26 ASSESSMENT — PAIN DESCRIPTION - ONSET: ONSET: ON-GOING

## 2021-10-26 ASSESSMENT — PAIN DESCRIPTION - PAIN TYPE: TYPE: SURGICAL PAIN

## 2021-10-26 ASSESSMENT — PAIN DESCRIPTION - DESCRIPTORS
DESCRIPTORS: ACHING;DISCOMFORT
DESCRIPTORS: DISCOMFORT

## 2021-10-26 ASSESSMENT — PAIN DESCRIPTION - LOCATION: LOCATION: KNEE

## 2021-10-26 ASSESSMENT — PAIN - FUNCTIONAL ASSESSMENT: PAIN_FUNCTIONAL_ASSESSMENT: 0-10

## 2021-10-26 ASSESSMENT — PAIN DESCRIPTION - FREQUENCY: FREQUENCY: CONTINUOUS

## 2021-10-26 NOTE — PROGRESS NOTES
Was asked to risk stratify patient prior to OR    RCRI: 1 (on insulin). Patient did have cardiac catheterization on 10/25 which showed normal coronary arteries. Pt sodium also normal this morning in addition to Scr.  Overall low risk

## 2021-10-26 NOTE — ANESTHESIA PRE PROCEDURE
Department of Anesthesiology  Preprocedure Note       Name:  Cameron Romero   Age:  80 y.o.  :  1950                                          MRN:  [id number]         Date:  10/26/2021      Surgeon: Lonna Frankel):  Naseem Martino DO    Procedure: Procedure(s):  KNEE EXPLANT, IRRIGATION & DEBRIDEMENT, STATIC ANTIBIOTIC SPACER, LINK ORTHO FUSION NAIL SET- DR. ROY TO NOTIFY    Medications prior to admission:   Prior to Admission medications    Medication Sig Start Date End Date Taking?  Authorizing Provider   metFORMIN (GLUCOPHAGE) 1000 MG tablet Take 1,000 mg by mouth 2 times daily (with meals)  Patient not taking: Reported on 10/21/2021    Historical Provider, MD   losartan (COZAAR) 100 MG tablet Take 100 mg by mouth daily    Historical Provider, MD   insulin lispro (HUMALOG) 100 UNIT/ML injection vial Inject into the skin 3 times daily (before meals)    Historical Provider, MD   fenofibrate 160 MG tablet Take 160 mg by mouth daily    Historical Provider, MD   omeprazole (PRILOSEC) 20 MG delayed release capsule Take 20 mg by mouth daily    Historical Provider, MD   clopidogrel (PLAVIX) 75 MG tablet Take 75 mg by mouth daily    Historical Provider, MD   insulin glargine (LANTUS) 100 UNIT/ML injection vial Inject 30 Units into the skin nightly     Historical Provider, MD       Current medications:    Current Facility-Administered Medications   Medication Dose Route Frequency Provider Last Rate Last Admin    [facility] Hold] 0.9 % sodium chloride infusion   IntraVENous PRN Eleni Monroe DO        [MAR Hold] amLODIPine (NORVASC) tablet 10 mg  10 mg Oral Daily Rudolph Burns MD   10 mg at 10/26/21 0921    [MAR Hold] carvedilol (COREG) tablet 3.125 mg  3.125 mg Oral BID DINESH Hernandez CNP   3.125 mg at 10/26/21 0918    [MAR Hold] sodium chloride flush 0.9 % injection 10 mL  10 mL IntraVENous PRN DINESH Webster NP   10 mL at 10/25/21 1046    [MAR Hold] sodium chloride flush 0.9 % injection 10 mL  10 mL IntraVENous PRN Norpedrito Gums, APRN - NP   10 mL at 10/25/21 0736    [MAR Hold] 0.9 % sodium chloride infusion   IntraVENous Continuous Michelle Zepeda MD 75 mL/hr at 10/25/21 1707 Rate Verify at 10/25/21 1707    [MAR Hold] sodium chloride flush 0.9 % injection 5-40 mL  5-40 mL IntraVENous 2 times per day Michelle Zepeda MD   10 mL at 10/26/21 0931    [MAR Hold] sodium chloride flush 0.9 % injection 5-40 mL  5-40 mL IntraVENous PRN Michelle Zepeda MD        [facility] Hold] 0.9 % sodium chloride infusion  25 mL IntraVENous PRN Michelle Zepeda MD        [facility] Hold] sodium chloride flush 0.9 % injection 5-40 mL  5-40 mL IntraVENous 2 times per day Monique Blankenship MD   10 mL at 10/26/21 0930    [MAR Hold] sodium chloride flush 0.9 % injection 5-40 mL  5-40 mL IntraVENous PRN Monique Blankenship MD        [facility] Hold] 0.9 % sodium chloride infusion  25 mL IntraVENous PRN Monique Blankenship MD        [facility] Hold] acetaminophen (TYLENOL) tablet 650 mg  650 mg Oral Q4H PRN Monique Blankenship MD        [facility] Hold] insulin glargine (LANTUS) injection vial 30 Units  30 Units SubCUTAneous Nightly DINESH Reid - CNP   30 Units at 10/25/21 2036    [MAR Hold] fentaNYL (SUBLIMAZE) injection 50 mcg  50 mcg IntraVENous Q3H PRN Romelia Lipoma, APRN - NP   50 mcg at 10/25/21 1700    [Held by provider] clopidogrel (PLAVIX) tablet 75 mg  75 mg Oral Daily Romelia Lipoma, APRN - NP        [Held by provider] fenofibrate (TRIGLIDE) tablet 160 mg  160 mg Oral Daily Romelia Lipoma, APRN - NP        [facility] losartan (COZAAR) tablet 100 mg  100 mg Oral Daily Romelia Lipoma, APRN - NP   100 mg at 10/26/21 0918    [MAR Hold] pantoprazole (PROTONIX) tablet 40 mg  40 mg Oral QAM AC Romelia Lipoma, APRN - NP        [facility] Hold] insulin lispro (HUMALOG) injection vial 0-12 Units  0-12 Units SubCUTAneous TID WC Romelia Lipoma, APRN - NP        [facility] Hold] glucose (GLUTOSE) 40 % oral gel 15 g  15 g Oral PRN Romelia Lipoma, APRN - NP        [facility] Hold] dextrose 50 % IV solution  12.5 g IntraVENous PRN Romelia Lipoma, APRN - NP        [facility] Hold] glucagon injection 1 mg  1 mg IntraMUSCular PRN Romelia Lipoma, APRN - NP        [facility] Hold] dextrose 5 % solution  100 mL/hr IntraVENous PRN Romelia Lipoma, APRN - NP        [facility] Hold] cefTRIAXone (ROCEPHIN) 2000 mg IVPB in D5W 50ml minibag  2,000 mg IntraVENous Q24H Romelia Lipoma, DINESH - NP   Stopped at 10/26/21 1022    [MAR Hold] insulin lispro (HUMALOG) injection vial 0-6 Units  0-6 Units SubCUTAneous Nightly DINESH Reid CNP   1 Units at 10/25/21 2036    [MAR Hold] sodium chloride flush 0.9 % injection 5-40 mL  5-40 mL IntraVENous 2 times per day Diane Dinero MD   10 mL at 10/22/21 0011    [MAR Hold] sodium chloride flush 0.9 % injection 5-40 mL  5-40 mL IntraVENous PRN Diane Dinero MD        [facility] Hold] 0.9 % sodium chloride infusion  25 mL IntraVENous PRN Diane Dinero MD        [facility] Hold] ondansetron (ZOFRAN-ODT) disintegrating tablet 4 mg  4 mg Oral Q8H PRN Diane Dinero MD        Or   Lala Baker [facility] Hold] ondansetron (ZOFRAN) injection 4 mg  4 mg IntraVENous Q6H PRN Diane Dinero MD   4 mg at 10/23/21 1912    [MAR Hold] magnesium hydroxide (MILK OF MAGNESIA) 400 MG/5ML suspension 30 mL  30 mL Oral Daily PRN Diane Dinero MD   30 mL at 10/23/21 1917    [MAR Hold] acetaminophen (TYLENOL) tablet 650 mg  650 mg Oral Q6H PRN Diane Dinero MD        Or   Shantel Glover Hold] acetaminophen (TYLENOL) suppository 650 mg  650 mg Rectal Q6H PRN Diane Dinero MD        [Held by provider] heparin (porcine) injection 5,000 Units  5,000 Units SubCUTAneous 3 times per day Diane Dinero MD   5,000 Units at 10/24/21 2143    [MAR Hold] oxyCODONE-acetaminophen (PERCOCET) 5-325 MG per tablet 1 tablet  1 tablet Oral Q4H PRN DINESH Morales - CNP   1 tablet at 10/26/21 0507       Allergies:     Allergies   Allergen Reactions    Bactrim [Sulfamethoxazole-Trimethoprim]     10/26/2021    MCV 92.4 10/26/2021    RDW 14.7 10/26/2021     10/26/2021       CMP:   Lab Results   Component Value Date     10/26/2021    K 4.8 10/26/2021     10/26/2021    CO2 21 10/26/2021    BUN 22 10/26/2021    CREATININE 0.78 10/26/2021    GFRAA >60 10/26/2021    LABGLOM >60 10/26/2021    GLUCOSE 114 10/26/2021    PROT 7.7 08/05/2017    CALCIUM 8.4 10/26/2021    BILITOT 0.38 08/05/2017    ALKPHOS 162 08/05/2017    AST 29 08/05/2017    ALT 32 08/05/2017       POC Tests:   Recent Labs     10/26/21  1254   POCGLU 99       Coags: No results found for: PROTIME, INR, APTT    HCG (If Applicable): No results found for: PREGTESTUR, PREGSERUM, HCG, HCGQUANT     ABGs: No results found for: PHART, PO2ART, NRR4WUF, TOJ1BQG, BEART, D0PFBNRX     Type & Screen (If Applicable):  No results found for: LABABO, LABRH    Drug/Infectious Status (If Applicable):  No results found for: HIV, HEPCAB    COVID-19 Screening (If Applicable):   Lab Results   Component Value Date    COVID19 Not Detected 10/22/2021           Anesthesia Evaluation  Patient summary reviewed and Nursing notes reviewed no history of anesthetic complications:   Airway: Mallampati: III  TM distance: >3 FB   Neck ROM: full  Mouth opening: > = 3 FB Dental:    (+) edentulous      Pulmonary:normal exam    (+) COPD:  sleep apnea:                             Cardiovascular:  Exercise tolerance: good (>4 METS),   (+) hypertension:, CAD:,     (-) CABG/stent    ECG reviewed  Rhythm: regular  Rate: normal  Echocardiogram reviewed  Stress test reviewed       Beta Blocker:  Not on Beta Blocker         Neuro/Psych:   Negative Neuro/Psych ROS              GI/Hepatic/Renal:   (+) morbid obesity     (-) GERD       Endo/Other:    (+) DiabetesType II DM, , .                 Abdominal:             Vascular: Other Findings:             Anesthesia Plan      general and regional     ASA 3       Induction: intravenous.     MIPS: Postoperative opioids intended and Prophylactic antiemetics administered. Anesthetic plan and risks discussed with patient. Use of blood products discussed with patient whom consented to blood products.    Plan discussed with CRNA and surgical team.                  Melba Alba MD   10/26/2021

## 2021-10-26 NOTE — PROGRESS NOTES
[facility]  Office: [address], DO, Rossana Kevyn, DO, Estela Galloway, DO, Kaylen Maritza Lyons, DO, Kenisha Dao MD, Katiana Zavala MD, Lucero Ramirez MD, Steven Hubbard MD, Velta Bernheim, MD, Daniela Murphy MD, Stephanie Ochoa MD, Cherelle Del Cid MD, Brianen Pak DO, Steph Neely DO, Saumya Qureshi MD,  Janel Houser DO, Rosy Schirmer, MD, Anum Harvey MD, Emerita Leavitt MD, Elisabeth Christianson MD, Karina Rojas MD, Norma Hogan MD, Jannet Noyola [facility], [facility], CNP, Chau Resendiz, CNP, Vincenzo Arriaga, CNS, Edward Wetzel, CNP, Melab Butler, CNP, Mauro De León, CNP, Oneyda Mitchell, CNP, Beth Vidal, CNP, Jonathan Enciso, CNP, Jazmín Avendaño PA-C, Jon Geronimo, [facility], Lin Bullard, CNP, Tosha Mckeon, CNP, Samanta Nava, CNP, Tamra Brunson, CNP, Antolin Adam, CNP, Isabel Borja, CNP, Eryn Osullivan, [address] [place] [address]    Progress Note    10/26/2021    7:49 AM    Name:   Cherylene Gelineau  MRN:     [id number]     Acct:      [de-identified]   Room:   [facility] Day:  5  Admit Date:  10/21/2021 10:51 PM    PCP:   Chavez Holman  Code Status:  Full Code    Subjective:     C/C: arthritis  Interval History Status: not changed. Patient seen and examined bedside. No complaints. Denies any chest pain, shortness of breath or difficulty breathing. Still having knee pain    Brief History:     80 y.o. female being seen for a left knee periprosthetic joint infection. Patient overall feeling well aside from pain in left knee. Plan for OR today for explantation and antibiotic spacer.     Review of Systems:     Constitutional:  negative for chills, fevers, sweats  Respiratory:  negative for cough, dyspnea on exertion, shortness of breath, wheezing  Cardiovascular:  negative for chest pain, chest pressure/discomfort, lower extremity edema, palpitations  Gastrointestinal:  negative for abdominal pain, constipation, diarrhea, nausea, vomiting  Neurological:  negative for dizziness, headache    Medications: Allergies: Allergies   Allergen Reactions    Bactrim [Sulfamethoxazole-Trimethoprim]     Codeine     Lisinopril Other (See Comments)     cough       Current Meds:   Scheduled Meds:    amLODIPine  10 mg Oral Daily    carvedilol  3.125 mg Oral BID WC    sodium chloride flush  5-40 mL IntraVENous 2 times per day    sodium chloride flush  5-40 mL IntraVENous 2 times per day    insulin glargine  30 Units SubCUTAneous Nightly    [Held by provider] clopidogrel  75 mg Oral Daily    [Held by provider] fenofibrate  160 mg Oral Daily    losartan  100 mg Oral Daily    pantoprazole  40 mg Oral QAM AC    insulin lispro  0-12 Units SubCUTAneous TID WC    cefTRIAXone (ROCEPHIN) IV  2,000 mg IntraVENous Q24H    insulin lispro  0-6 Units SubCUTAneous Nightly    sodium chloride flush  5-40 mL IntraVENous 2 times per day    heparin (porcine)  5,000 Units SubCUTAneous 3 times per day     Continuous Infusions:    sodium chloride      sodium chloride 75 mL/hr at 10/25/21 1707    sodium chloride      sodium chloride      dextrose      sodium chloride       PRN Meds: sodium chloride, sodium chloride flush, sodium chloride flush, sodium chloride flush, sodium chloride, sodium chloride flush, sodium chloride, acetaminophen, fentanNYL, glucose, dextrose, glucagon (rDNA), dextrose, sodium chloride flush, sodium chloride, ondansetron **OR** ondansetron, magnesium hydroxide, acetaminophen **OR** acetaminophen, oxyCODONE-acetaminophen    Data:     Past Medical History:   has a past medical history of Arthritis, Diabetes mellitus ([address] Hyperlipidemia, and Hypertension. Social History:   reports that she has never smoked. She does not have any smokeless tobacco history on file. She reports that she does not drink alcohol and does not use drugs. Family History: No family history on file.     Vitals:  BP (!) 145/76   Pulse 80   Temp 98.2 °F (36.8 °C) (Oral)   Resp 16   Ht 5' 4\" (1.626 m)   Wt (!) 300 lb 4.8 oz (136.2 kg)   SpO2 98%   BMI 51.55 kg/m²   Temp (24hrs), Av.4 °F (36.9 °C), Min:98.2 °F (36.8 °C), Max:98.5 °F (36.9 °C)    Recent Labs     10/24/21  1528 10/24/21  2130 10/25/21  0658 10/25/21  2032   POCGLU 194* 249* 128* 181*       I/O (24Hr): Intake/Output Summary (Last 24 hours) at 10/26/2021 0749  Last data filed at 10/26/2021 0659  Gross per 24 hour   Intake --   Output 1800 ml   Net -1800 ml       Labs:  Hematology:  Recent Labs     10/24/21  0455 10/25/21  0504 10/26/21  0443   WBC 9.4 7.9 7.8   RBC 3.23* 3.31* 3.15*   HGB 8.7* 8.9* 8.5*   HCT 29.1* 29.8* 29.1*   MCV 90.1 90.0 92.4   MCH 26.9 26.9 27.0   MCHC 29.9 29.9 29.2   RDW 14.8* 14.6* 14.7*    266 278   MPV 10.4 9.9 9.8   .1*  --  141.8*     Chemistry:  Recent Labs     10/24/21  0455 10/25/21  0504 10/26/21  0443    128* 135   K 5.0 5.2 4.8    99 104   CO2 21 21 21   GLUCOSE 163* 130* 114*   BUN 30* 23 22   CREATININE 1.00* 0.91* 0.78   MG 2.2  --   --    ANIONGAP 12 8* 10   LABGLOM 55* >60 >60   GFRAA >60 >60 >60   CALCIUM 8.5* 8.2* 8.4*     Recent Labs     10/24/21  0713 10/24/21  1118 10/24/21  1528 10/24/21  2130 10/25/21  0658 10/25/21  2032   POCGLU 136* 172* 194* 249* 128* 181*     ABG:No results found for: POCPH, PHART, PH, POCPCO2, FXJ3KPF, PCO2, POCPO2, PO2ART, PO2, POCHCO3, CMU4XRV, HCO3, NBEA, PBEA, BEART, BE, THGBART, THB, XCU9LVL, GMSK3XHG, U2LRQQBM, O2SAT, FIO2  Lab Results   Component Value Date/Time    SPECIAL RT 4ML 10/21/2021 11:00 PM     Lab Results   Component Value Date/Time    CULTURE NO GROWTH 5 DAYS 10/21/2021 11:00 PM       Radiology:  XR FEMUR LEFT (MIN 2 VIEWS)    Result Date: 10/22/2021  1. No acute osseous abnormality in the left femur or tibia/fibula. 2.  Left knee arthroplasty in place without evidence for hardware loosening. 3.  Leg length measurements provided above.   Please note limitations and landmarks used.     XR KNEE LEFT (3 VIEWS)    Result Date: 10/22/2021  1. Soft tissue edema and effusion. 2. No acute osseous abnormality. XR TIBIA FIBULA LEFT (2 VIEWS)    Result Date: 10/22/2021  1. No acute osseous abnormality in the left femur or tibia/fibula. 2.  Left knee arthroplasty in place without evidence for hardware loosening. 3.  Leg length measurements provided above. Please note limitations and landmarks used. XR CHEST PORTABLE    Result Date: 10/22/2021  1. Aberrant positioning of the right upper extremity PICC line. Tip is either within the distal left subclavian vein, or directed into the azygous vein. Repositioning is recommended 2. Report was marked to be called to a licensed caregiver     NM Cardiac Stress Test Nuclear Imaging    Result Date: 10/25/2021  Perfusion:  Stress-induced reversible defects as above involving 12% of the left ventricular myocardium at stress. Function:  Normal left ventricular ejection fraction of 55%. Mild perfusion defect in the apex and mid inferior wall. Risk stratification: HIGH DUE TO PERFUSION DEFECTS. Notes concerning risk stratification: Risk stratification incorporates both clinical history and some testing results. Final risk determination is the responsibility of the ordering provider as other patient information and test results may increase or decrease the risk assessment reported for this examination. Risk stratification criteria are adapted from \"Noninvasive Risk Stratification\" criteria from [employer]. Al, ACC/AATS/AHA/ASE/ASNC/SCAI/SCCT/STS 2017 Appropriate Use Criteria For Coronary Revascularization in Patients With Stable Ischemic Heart Disease [facility] Volume 69, Issue 17, May 2017 High risk (>3% annual death or MI) 1. Severe resting LV dysfunction (LVEF <35%) not readily explained by non coronary causes 2. Resting perfusion abnormalities greater than 10% of the myocardium in patients without prior history or evidence of MI3.  Stress-induced perfusion abnormalities encumbering greater than or equal to 10% myocardium or stress segmental scores indicating multiple vascular territories with abnormalities 4. Stress-induced LV dilatation (TID ratio greater than 1.19 for exercise and greater than 1.39 for regadenoson) Intermediate risk (1% to 3% annual death or MI) 1. Mild/moderate resting LV dysfunction (LVEF 35% to 49%) not readily explained by non coronary causes. 2. Resting perfusion abnormalities in 5%-9.9% of the myocardium in patients without a history or prior evidence of MI 3. Stress-induced perfusion abnormality encumbering 5%-9.9% of the myocardium or stress segmental scores indicating 1 vascular territory with abnormalities but without LV dilation 4. Small wall motion abnormality involving 1-2 segments and only 1 coronary bed. Low Risk (Less than 1% annual death or MI) 1. Normal or small myocardial perfusion defect at rest or with stress encumbering less than 5% of the myocardium.        Physical Examination:        General appearance:  alert, cooperative and no distress  Mental Status:  oriented to person, place and time and normal affect  Lungs:  clear to auscultation bilaterally, normal effort  Heart:  regular rate and rhythm, no murmur  Abdomen:  soft, nontender, nondistended, normal bowel sounds, no masses, hepatomegaly, splenomegaly  Extremities:  no edema, redness, tenderness in the calves  Skin:  no gross lesions, rashes, induration    Assessment:        Hospital Problems         Last Modified POA    * (Principal) Staphylococcal arthritis of left knee ([address] 10/22/2021 Yes    Type 2 diabetes mellitus without complication, without long-term current use of insulin ([address] 10/22/2021 Yes    PTIA on CPAP 10/22/2021 Yes    CAD (coronary artery disease) 10/22/2021 Yes    COPD (chronic obstructive pulmonary disease) ([address] 10/22/2021 Yes    Primary hypertension 10/22/2021 Yes    Acute kidney injury superimposed on CKD ([address] 10/22/2021 Yes    Normocytic normochromic anemia 10/22/2021 Yes          Plan:        Left knee periprosthetic joint infection: Plan for OR today per Orthopedic surgery. Continue Rocephin 2 gm IV every 24 hours. Hold chemical AC per orthopedic surgery. Blood cultures NGDT x1. Acute Kidney injury on Chronic Kidney disease: Resolved. Scr back to baseline  Chronic Hyponatremia: resolved  Normocytic normochromic anemia: functional NOY. Tsat 9%. Consider Iron once acute infection resolves. Monitor Hgb, transfuse prn for Hgb <7. Diabetes mellitus type II with Hyperglycemia: Continue Lantus 30 units QHS, ISS  Morbid Obesity BMI of 51: recommend weight loss and lifestyle modification  Uncontrolled Hypertension: continue home antihypertensive regimen. Continue Coreg 3.125, Norvasc, Losartan.    Suspected PITA: needs outpt sleep study  DVT ppx  PT/OT    Klaudia Valera DO  10/26/2021  7:49 AM

## 2021-10-26 NOTE — PROGRESS NOTES
Orthopedic Progress Note    Patient:  José Antonio Grande  YOB: 1950     80 y.o. female    Subjective:  Patient seen and examined for   Not cleared for OR by cardiology yesterday due to abnormal labs (Na 128)  and + stress test  Taken for cardiac cath yesterday for assessment  No issue overnight  Pain controlled on current regimen  Denies fever, HA, CP, SOB, N/V, dysuria    Vitals reviewed, afebrile    Objective:   Vitals:    10/26/21 0030   BP: (!) 149/59   Pulse:    Resp: 16   Temp: 98.3 °F (36.8 °C)   SpO2:      Gen: NAD, cooperative     Cardiovascular: Regular rate    Respiratory: Chest symmetric, no accessory muscle use, normal respirations, no audible wheezes    LLE: Edema throughout the leg. Erythema and warmth at the distal aspect of the tibia with associated TTP, with erythema spreading proximally and distally. Knee effusion noted; with limited ROM secondary to pain. Compartments soft. 2+ DP/PT pulse. TA/EHL/FHL/GS motor intact. Deep and Superficial Peroneal/Saphenous/Sural SILT. Recent Labs     10/25/21  0504   WBC 7.9   HGB 8.9*   HCT 29.8*      *   K 5.2   BUN 23   CREATININE 0.91*   GLUCOSE 130*      Chemical AC: Heparin  ABx: Ceftriaxone  See rec for complete list     Impression/plan: 71 y.o. female with a L TKA 14 years ago being seen for:     - Left knee periprosthetic joint infection    -OR today with Dr. Mckay Foster for left knee explant/spacer placement pending clearance  -Cardiac clearance needed for OR today. Discussed clearance with cardiology fellow, who suggested no concerns and cleared for surgery. Plan for cardiology team to round on patient this AM and place clearance note.   -Sodium 128 yesterday, although patient with chronic hyponatremia per IM team. New labs scheduled for 0600 today. Will follow up. -NPO  -Please hold chemical AC for OR today.  OK to continue POD1  -WB status: WBAT LLE  -Pain/DVT/ABX per primary  - (10/24), will continue to trend  -Ice (20 min, 1 hour off) for edema/pain control  -Encourage deep breathing and incentive spirometry use  -PT/OT assessment and evaluation after surgery  -Please page ortho with any questions    Giovanni Quarles DO  Orthopedic Surgery Resident, PGY-1  [address], [place]      PGY-2 Addendum    Patient seen and examined. Agree with above. 80 y.o. female being seen with left knee periprosthetic joint infection. Continues to complain of pain in left knee and difficulty with ROM secondary to plan. Plan will be for left knee explant with antibiotic spacer placement pending clearance from cardiology and internal medicine teams. NPO until after surgery. Please hold chemical anticoagulation until POD#1. Page ortho with questions.      Carolina Palencia DO, PGY-2  Orthopedic Surgery Resident  [facility], [facility], [facility]

## 2021-10-26 NOTE — PROGRESS NOTES
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 Scheduled explantation and antibiotic spacer device placement 10-25-21. Medical Decision Making/Summary/Discussion:10/26/2021       Infection Control Recommendations   [place] Precautions      Antimicrobial Stewardship Recommendations     Simplification of therapy    Coordination of Outpatient Care:   Estimated Length of IV antimicrobials:4 weeks  Patient will need Midline Catheter Insertion: Yes  Patient will need PICC line Insertion:No  Patient will need: Home IV , [place],  SNF,  LTAC: TBD  Patient will need outpatient wound care:Yes    Chief complaint/reason for consultation:   Lt knee pyogenic arthritis      History of Present Illness:   Aneta Lemon is a 80y.o.-year-old female who was initially admitted on 10/21/2021. Patient seen at the request of . INITIAL HISTORY:    Patient with a Hx of prior Lt TKA  In 2007 at [facility] under Dr Kyle Fink, She subsequently experienced  Lt knee infections which required additional surgeries x 2. She has underlying DM 2, Essential HTN, CKD,PITA and COPD. Presented to [facility] because of acute onset of pain with ambulation. The knee joint was aspirated and grew Strep. Agalactiae. She was started on antibiotics (ceftriaxone) and was awaiting evaluation at tertiary care center because of her Hx of prior surgeries and infections. [facility] refused transfer. She presented to [facility] because of ongoing severe pain, erythema and inflammation.     Ortho has evaluated and plan intervention on 10-25-21. CURRENT EVALUATION : 10/26/2021    Afebrile  VS stable    Had pre-op evaluation by Cardiology including stress test.    - Not cleared by Cardiology yesterday due to abnormal electrolytes and positive stress test.   S/p left heart cath 10/25: normal coronary arteries, preserved LV systolic function    Orthopedic Surgery following: Plan for OR today for left knee explant/spacer placement; pending cardiac clearance     Cardiology following: intermediate risk for ortho surgery       Labs, X rays reviewed: 10/26/2021    BUN: 40-->22  Cr: 1.57-->0.78    WBC: 10.4-->7.8  Hb:8.9-->8.5  Plat: 207--?278    Cultures:  Urine:    Blood:  10/22/21: no growth  Sputum :    Wound:  Knee aspirate: Strep agalactiae      COVID rapid 10/22/21: negative    Discussed with patient, RN, IM. I have personally reviewed the past medical history, past surgical history, medications, social history, and family history, and I have updated the database accordingly.   Past Medical History:     Past Medical History:   Diagnosis Date    Arthritis     Diabetes mellitus ([facility] [address]     Hyperlipidemia     Hypertension        Past Surgical  History:     Past Surgical History:   Procedure Laterality Date    HYSTERECTOMY         Medications:      amLODIPine  10 mg Oral Daily    carvedilol  3.125 mg Oral BID WC    sodium chloride flush  5-40 mL IntraVENous 2 times per day    sodium chloride flush  5-40 mL IntraVENous 2 times per day    insulin glargine  30 Units SubCUTAneous Nightly    [Held by provider] clopidogrel  75 mg Oral Daily    [Held by provider] fenofibrate  160 mg Oral Daily    losartan  100 mg Oral Daily    pantoprazole  40 mg Oral QAM AC    insulin lispro  0-12 Units SubCUTAneous TID WC    cefTRIAXone (ROCEPHIN) IV  2,000 mg IntraVENous Q24H    insulin lispro  0-6 Units SubCUTAneous Nightly    sodium chloride flush  5-40 mL IntraVENous 2 times per day    [Held by provider] heparin (porcine)  5,000 Units SubCUTAneous 3 times per day       Social History:     Social History     Socioeconomic History    Marital status:      Spouse name: Not on file    Number of children: Not on file    Years of education: Not on file    Highest education level: Not on file   Occupational History    Not on file   Tobacco Use    Smoking status: Never Smoker   Substance and Sexual Activity    Alcohol use: No    Drug use: No    Sexual activity: Not on file   Other Topics Concern    Not on file   Social History Narrative    Not on file     Social Determinants of Health     Financial Resource Strain:     Difficulty of Paying Living Expenses:    Food Insecurity:     Worried About Running Out of Food in the Last Year:     [address] in the Last Year:    Transportation Needs:     Lack of Transportation (Medical):  Lack of Transportation (Non-Medical):    Physical Activity:     Days of Exercise per Week:     Minutes of Exercise per Session:    Stress:     Feeling of Stress :    Social Connections:     Frequency of Communication with Friends and Family:     Frequency of Social Gatherings with Friends and Family:     Attends Rastafari Services:     Active Member of Clubs or Organizations:     Attends Club or Organization Meetings:     Marital Status:    Intimate Partner Violence:     Fear of Current or Ex-Partner:     Emotionally Abused:     Physically Abused:     Sexually Abused:        Family History:   No family history on file. Allergies:   Bactrim [sulfamethoxazole-trimethoprim], Codeine, and Lisinopril     Review of Systems:   Constitutional: No fevers or chills. No systemic complaints  Head: No headaches  Eyes: No double vision or blurry vision. No conjunctival inflammation. ENT: No sore throat or runny nose. . No hearing loss, tinnitus or vertigo. Cardiovascular: No chest pain or palpitations. No shortness of breath. No FRANCES  Lung: No shortness of breath or cough.  No sputum production  Abdomen: No nausea, vomiting, diarrhea, or abdominal pain. Vearl Pippins No cramps. Genitourinary: No increased urinary frequency, or dysuria. No hematuria. No suprapubic or CVA pain  Musculoskeletal: No muscle aches or pains. No joint effusions, swelling or deformities. Lt knee effusion  Hematologic: No bleeding or bruising. Neurologic: No headache, weakness, numbness, or tingling. Integument: No rash, no ulcers. Lt knee erythema  Psychiatric: No depression. Endocrine: No polyuria, no polydipsia, no polyphagia. Physical Examination :     Patient Vitals for the past 8 hrs:   BP Temp Temp src Pulse Resp SpO2 Weight   10/26/21 0710 (!) 145/76 98.2 °F (36.8 °C) Oral 80 16 98 % --   10/26/21 0510 -- -- -- -- -- -- (!) 300 lb 4.8 oz (136.2 kg)   10/26/21 0500 (!) 159/73 98.5 °F (36.9 °C) Oral -- 16 -- --     General Appearance: Awake, alert, and in no apparent distress  Head:  Normocephalic, no trauma  Eyes: Pupils equal, round, reactive to light and accommodation; extraocular movements intact; sclera anicteric; conjunctivae pink. No embolic phenomena. ENT: Oropharynx clear, without erythema, exudate, or thrush. No tenderness of sinuses. Mouth/throat: mucosa pink and moist. No lesions. Dentition in good repair. Neck:Supple, without lymphadenopathy. Thyroid normal, No bruits. Pulmonary/Chest: Clear to auscultation, without wheezes, rales, or rhonchi. No dullness to percussion. Cardiovascular: Regular rate and rhythm without murmurs, rubs, or gallops. Abdomen: Soft, non tender. Bowel sounds normal. No organomegaly  All four Extremities: No cyanosis, clubbing, edema. Lt knee effusion and erythema. Neurologic: No gross sensory or motor deficits. Skin: Warm and dry with good turgor. No signs of peripheral arterial or venous insufficiency. No ulcerations. No open wounds.     Medical Decision Making -Laboratory:   I have independently reviewed/ordered the following labs:    CBC with Differential:   Recent Labs medial femoral condyle to the mid tibial plafond   is 36 cm, which includes the polyethylene liner in the knee arthroplasty. (Total leg length of approximately 75.2 cm from superior tip of greater   trochanter to mid tibial plafond).  The liner measures up to 2 cm in   thickness.       No acute fracture involving the tibia or fibula.  No periprosthetic lucency   in the proximal tibia.  Ankle mortise alignment is preserved.  Scattered   dystrophic calcifications in the leg.           Impression   1.  No acute osseous abnormality in the left femur or tibia/fibula.       2.  Left knee arthroplasty in place without evidence for hardware loosening.       3.  Leg length measurements provided above.  Please note limitations and   landmarks used.         CT extremity lower left with contrast    Result Date: 10/18/2021  History: Acute left lower leg and ankle redness Exam/Technique: Thin axial images through the left lower extremity were obtained from the superior aspect of the acetabulum to the left foot following the intravenous demonstration of 100 mL Omnipaque 300. Study was supplemented by sagittal and coronal reconstructed images. Comparison: None Findings: There is a left knee arthroplasty in place. Full evaluation of the area of the knee is compromised by extensive metallic artifact. There is a joint effusion of the left knee with fluid seen in the supra patellar bursa. There is no evidence for an acute osseous abnormality. No lytic or blastic processes are seen. No evidence of osteolysis demonstrated. No soft tissue masses or fluid collections are identified. IMPRESSION: 1. Left knee joint effusion with a left knee arthroplasty in place. 2. Otherwise normal CT of the left lower extremity.  Workstation:[id number] Finalized by Zeenat Malone MD on 10/18/2021     Medical Decision Agaogm-Dbbrjpso-Prvad:   Microbiology Results   Procedure Component Value Units Date/Time   Body fluid culture includes gram stain [[id number]] (Abnormal) Collected: 10/18/21 1210   Specimen: Fluid Updated: 10/19/21 1252   Gram Stain Result WHITE BLOOD CELLS PRESENT   FEW GRAM POSITIVE COCCI   QUANTITY NOT SUFFICIENT TO CONCENTRATE INTERPRET RESULTS WITH CAUTION   A Negative report does not exclude the possibility of infection because results are dependent on adequate specimen collection. Culture RARE STREPTOCOCCUS AGALACTIAE (GROUP B)   SARS COV 2 (COVID-19) [facility] Onsite Test [[id number]] Collected: 10/18/21 0253   Specimen: Nasopharynx Updated: 10/18/21 0335   Specimen Naso Pharynx   Sent to Testing to be performed at [address] [place] [address] COVID-19 [facility] Report to Follow. SARS CoV 2 [[id number]] Collected: 10/18/21 0253   Specimen: Nasopharynx Updated: 10/18/21 0336   First Test? UNKNOWN   Employed in Healthcare? UNKNOWN   Symptoms Defined by CDC? NO   Symptom Onset? U^UNKNOWN   Hospitalized for Covid? UNKNOWN   ICU for Covid? UNKNOWN   Congregate Setting? UNKNOWN   Pregnant? NO   Specimen Type Naso Pharynx   SARS COV 2 Presumptive Negative     Medical Decision Making-Other:     Note:  Labs, medications, radiologic studies were reviewed with personal review of films   Large amounts of data were reviewed  Discussed with nursing Staff, Discharge planner  Infection Control and Prevention measures reviewed  All prior entries were reviewed  Administer medications as ordered  Prognosis: Guarded  Discharge planning reviewed  Follow up as outpatient. Thank you for allowing us to participate in the care of this patient. Please call with questions. Sierra Carbajal MD, PGY-1  Infectious Disease/ Internal Medicine Resident  [place], [place]    ATTESTATION:    I have discussed the case, including pertinent history and exam findings with the residents and students. I have seen and examined the patient and the key elements of the encounter have been performed by me.  I was present when the student obtained his information or examined the patient. I have reviewed the laboratory data, other diagnostic studies and discussed them with the residents. I have updated the medical record where necessary. I agree with the assessment, plan and orders as documented by the resident/ student.     Watson Andres MD.      Pager: [phone] - Office: [phone]

## 2021-10-26 NOTE — ANESTHESIA PROCEDURE NOTES
Peripheral Block    Patient location during procedure: pre-op  Start time: 10/26/2021 2:12 PM  End time: 10/26/2021 2:14 PM  Staffing  Performed: anesthesiologist   Anesthesiologist: Melba Alba MD  Preanesthetic Checklist  Completed: patient identified, IV checked, site marked, risks and benefits discussed, surgical consent, monitors and equipment checked, pre-op evaluation, timeout performed, anesthesia consent given, oxygen available and patient being monitored  Peripheral Block  Patient position: supine  Prep: ChloraPrep  Patient monitoring: cardiac monitor, continuous pulse ox, frequent blood pressure checks and IV access  Block type: Femoral  Laterality: right  Injection technique: single-shot  Guidance: ultrasound guided  Local infiltration: lidocaine  Infiltration strength: 1 %  Dose: 3 mL  Adductor canal (Low Femoral)  Provider prep: mask and sterile gloves  Local infiltration: lidocaine  Needle  Needle gauge: 21 G  Needle length: 10 cm  Needle localization: ultrasound guidance  Test dose: negative  Assessment  Injection assessment: negative aspiration for heme, no paresthesia on injection and local visualized surrounding nerve on ultrasound  Paresthesia pain: none  Slow fractionated injection: yes  Hemodynamics: stable  Additional Notes  U/S [id number].  (1) Under ultrasound guidance, a 21 gauge needle was inserted and placed in close proximity to the saphenous nerve.  (2) Ultrasound was also used to visualize the spread of the anesthetic in close proximity to the nerve being blocked. (3) The nerve appeared anatomically normal, and (4 there were no apparent abnormal pathological findings on the image that were readily visible and related to the nerve being blocked. (5) A permanent ultrasound image was saved in the patient's record.           In addition to 20ml 0.125% Bupivacaine   Medications Administered  Bupivacaine (MARCAINE) PF injection 0.5%, 10 mL  Reason for block: post-op pain management and at surgeon's request

## 2021-10-26 NOTE — BRIEF OP NOTE
Brief Postoperative Note      Patient: Aaron Peralta  YOB: 1950  MRN: [id number]    Date of Procedure: 10/26/2021    Pre-Op Diagnosis: LEFT KNEE PERIPROSTHETIC INFECTIONS    Post-Op Diagnosis: Same       Procedure(s):  KNEE EXPLANT, IRRIGATION & DEBRIDEMENT, STATIC ANTIBIOTIC SPACER, LINK ORTHO FUSION NAIL SET-    Surgeon(s):  Janet Mayen DO    Assistant:  Resident: Jayda Davidson MD; Mahsa Moreno DO    Anesthesia: General    Estimated Blood Loss (mL): 250cc    IVF: 1500 cc crystalloid     Complications: None    Specimens:   * No specimens in log *    Implants:  Implant Name Type Inv. Item Serial No.  Lot No. LRB No. Used Action   CEMENT BONE 40GM HI VISC PALACOS R  CEMENT BONE 40GM HI VISC PALACOS R  [employer] [id number] Left 2 Implanted   POLY       Left 1 Explanted   TIBIA BASE PLATE      Left 1 Explanted   SCREW      Left 1 Explanted   FEMUR      Left 1 Explanted   CEMENT BONE 40GM HI VISC PALACOS R  CEMENT BONE 40GM HI VISC PALACOS R  [employer] [id number] Left 1 Implanted   CEMENT BONE 40GM HI VISC PALACOS R  CEMENT BONE 40GM HI VISC PALACOS R  [employer] [id number] Left 1 Implanted   LINK REF /08      [id number] Left 1 Implanted   LINK REF /6       [id number] Left 1 Implanted   LINK  REF /07      [id number] Left 1 Implanted         Drains:   Negative Pressure Wound Therapy Knee Anterior; Left (Active)       Findings: left knee tariq-prosthetic joint infection    Electronically signed by Jana Skiff, DO on 10/26/2021 at 7:24 PM

## 2021-10-26 NOTE — OP NOTE
Operative Note      Patient: Silver Renee  YOB: 1950  MRN: [id number]    Date of Procedure: 10/26/2021    Pre-Op Diagnosis: LEFT KNEE PERIPROSTHETIC INFECTION    Post-Op Diagnosis: Same       Procedure(s):  KNEE EXPLANT, IRRIGATION & DEBRIDEMENT, STATIC ANTIBIOTIC SPACER, LINK ORTHO FUSION NAIL SET-    Surgeon(s):  Desire Fine DO    Assistant:   Resident: Samuel Jean MD; David London DO    Anesthesia: General    Estimated Blood Loss (mL): 413    Complications: None    Specimens:   * No specimens in log *    Implants:  Implant Name Type Inv. Item Serial No.  Lot No. LRB No. Used Action   CEMENT BONE 40GM HI VISC PALACOS R  CEMENT BONE 40GM HI VISC PALACOS R  [employer] [id number] Left 2 Implanted   POLY       Left 1 Explanted   TIBIA BASE PLATE      Left 1 Explanted   SCREW      Left 1 Explanted   FEMUR      Left 1 Explanted   CEMENT BONE 40GM HI VISC PALACOS R  CEMENT BONE 40GM HI VISC PALACOS R  [employer] [id number] Left 1 Implanted   CEMENT BONE 40GM HI VISC PALACOS R  CEMENT BONE 40GM HI VISC PALACOS R  [employer] [id number] Left 1 Implanted   LINK REF /08      [id number] Left 1 Implanted   LINK REF /6       [id number] Left 1 Implanted   LINK  REF /07      [id number] Left 1 Implanted         Drains:   Negative Pressure Wound Therapy Knee Anterior; Left (Active)       Findings: Per operative note    Detailed Description of Procedure:   Ana Dietrich is a 81-year-old female who presented to [facility]) of  in surgical department for removal of an infected left total knee arthroplasty and placement of a fusion nail with a static antibiotic spacer. The patient's had progressively worsening health status secondary to chronic sepsis of the left knee. She was initially admitted to [facility] when her total knee arthroplasty infection was found through an aspiration.   It was felt that the patient should be transferred to a [facility] level of care and she was transferred to [facility] for definitive care. Discussions were held with the patient about multiple treatment options. Nonoperative treatment with antibiotic suppression was discussed however the patient is felt to be septic at this time and that treatment option is concerning. We also discussed thorough debridement and polyethylene exchange however the patient has had that done previously and continues to have left total knee arthroplasty subsidence. We also talked about static versus dynamic spacer. With the patient's health status, her body habitus, her multiple medical comorbidities, it was felt that she was best served with a static spacer in the form of a fusion nail. The fusion nail would allow her earlier weightbearing and hopefully decrease the significant risk associated with prolonged immobilization. Patient was identified preoperatively where consent was obtained, signed, placed on the chart. The procedure was described. Her questions were answered. All details of the procedure, as well as risks, benefits and alternatives, including the option of non operative versus operative treatment were discussed. The patient understands that risks of the surgery include but are not limited to: bleeding, malunion/nonunion, loss of fixation, loss of reduction, hardware failure, angular or rotational deformity, length discrepancy, limp, transfusion, skin blistering or breakdown, progressive post traumatic degenerative joint disease, possible need for further surgery, bone grafting, infection, nerve injury, paralysis, numbness, blood vessel injury, excessive scaring, wound complication or breakdown, failure of symptoms to improve or actual deterioration in condition, significant acute and/or chronic pain, possible need for amputation, permanent loss of motion, and permanent loss of function.   As well as the general complications of anesthesia, which include but are not limited to: myocardial infarction and/or heart attack, stroke, multi organ system failure or even possible death, prolonged hospital stay, blood clots, pulmonary embolism, abnormal reaction to medication, visual and neurological disturbances, constipation, ischemic bowel, bowel obstruction, bowel perforation, ileus and mental status changes. No guarantees were made. She was administered IV antibiotics perioperatively. She was then taken the operative theater she was placed upon upon the operative table. General anesthesia was affected. Left lower extremity was then prepped and draped in a sterile fashion. After an appropriate surgical timeout incision was made anteriorly over the previous incisional scar. Sharp incision was carried through the skin and subcutaneous tissue. Full-thickness skin flaps were raised over the extensor mechanism and a medial parapatellar arthrotomy to the knee was affected. Immediate return of significant purulent material was appreciated. This was thoroughly irrigated and suction. Subperiosteal dissection was made over the proximal medial tibia. The knee was flexed. The tibial polyethylene was removed after removal of the setscrew for the constrained component. Attention was then drawn to removal of the femoral, tibial and patellar components using small saws osteotomes and a large saw. Removal of the femoral component occurred with minimal loss of bone but a small medial epicondyle fracture. In addition remove the tibial component resulted in minimal bone loss but a coronal split in the proximal tibia. The patellar component was removed without difficulty. The knee was then thoroughly irrigated and suctioned. Debridement of the skin, subcutaneous tissue, muscle, fascia, bone was done sharply with a rongeur, scalpel, electrocautery and curette. The femoral and tibial canals were then thoroughly reamed after removal of cement from the canals.   These were reamed up to a size 12 on the tibia and a size 16 on the femur and the trial fusion nail system was put into place and locked. Portable x-rays were taken in the AP and lateral projections showing appropriate placement of the fusion nail without any significant complications. The trial was removed the canals were thoroughly irrigated and suctioned and the implantable fusion nail was placed and impacted till fully seated and then locked at the knee. 3 bags of cement were mixed on the back table mixed with 2 g of vancomycin powder and 2.4 g of tobramycin powder. This was a sloppy cement mixing. The cement was then packed around the fusion nail after bone wax was placed over the screw head so they could be removed at a later date without significant difficulty. Once the cement was hardened the extensor mechanism was closed using monofilament absorbable suture as was the subcutaneous tissue and skin. Sterile dressing was applied in the form of a Prevena dressing. Anesthesia was then reversed and patient was taken to postop recovery room in stable condition. There were no immediate postoperative complications and the procedure was tolerated well. It should be noted that throughout the procedure meticulous hemostasis was achieved electrocautery.         Electronically signed by Eva Plata DO on 10/26/2021 at 7:26 PM

## 2021-10-26 NOTE — PROGRESS NOTES
[facility]  Progress Note                   Date:   10/26/2021  Patient name: Susana Hinojosa  Date of admission:  10/21/2021 10:51 PM  MRN:   [id number]  YOB: 1950  PCP: Memo Singer    Reason for Admission: Septic arthritis of knee, bilateral ([facility] [address] [M00.9]    Subjective:       Clinical Changes /Abnormalities:  Patient seen and examined in room after discussion with RN. Denies chest pain or SOB. Reviewed STRESS and CATH. SR on tele HR 74      Review of Systems    Medications:   Scheduled Meds:   amLODIPine  10 mg Oral Daily    carvedilol  3.125 mg Oral BID WC    sodium chloride flush  5-40 mL IntraVENous 2 times per day    sodium chloride flush  5-40 mL IntraVENous 2 times per day    insulin glargine  30 Units SubCUTAneous Nightly    [Held by provider] clopidogrel  75 mg Oral Daily    [Held by provider] fenofibrate  160 mg Oral Daily    losartan  100 mg Oral Daily    pantoprazole  40 mg Oral QAM AC    insulin lispro  0-12 Units SubCUTAneous TID WC    cefTRIAXone (ROCEPHIN) IV  2,000 mg IntraVENous Q24H    insulin lispro  0-6 Units SubCUTAneous Nightly    sodium chloride flush  5-40 mL IntraVENous 2 times per day    [Held by provider] heparin (porcine)  5,000 Units SubCUTAneous 3 times per day     Continuous Infusions:   sodium chloride      sodium chloride 75 mL/hr at 10/25/21 1707    sodium chloride      sodium chloride      dextrose      sodium chloride       CBC:   Recent Labs     10/24/21  0455 10/25/21  0504 10/26/21  0443   WBC 9.4 7.9 7.8   HGB 8.7* 8.9* 8.5*    266 278     BMP:    Recent Labs     10/24/21  0455 10/25/21  0504 10/26/21  0443    128* 135   K 5.0 5.2 4.8    99 104   CO2 21 21 21   BUN 30* 23 22   CREATININE 1.00* 0.91* 0.78   GLUCOSE 163* 130* 114*     Hepatic:No results for input(s): AST, ALT, ALB, BILITOT, ALKPHOS in the last 72 hours. Troponin: No results for input(s): TROPHS in the last 72 hours.   BNP: No results for input(s): BNP in the last 72 hours. Lipids: No results for input(s): CHOL, HDL in the last 72 hours. Invalid input(s): LDLCALCU  INR: No results for input(s): INR in the last 72 hours. DATA:    Diagnostics:       EKG: NSR, NL ECG     STRESS 10/25/2021  Perfusion:  Stress-induced reversible defects as above involving 12% of the   left ventricular myocardium at stress.       Function:  Normal left ventricular ejection fraction of 55%.  Mild perfusion   defect in the apex and mid inferior wall.       Risk stratification: HIGH DUE TO PERFUSION DEFECTS. CATH 10/25/2021  Findings:    LMCA: Normal 0% stenosis. LAD: Normal 0% stenosis. LCx: Normal 0% stenosis. RCA: Normal 0% stenosis.      Coronary Tree Dominance: Right       LV Analysis LV function assessed as:Normal.   The LV gram was performed in the PARADA 30 position. LVEF: 55%.       Conclusions:   Normal coronary arteries    Preserved LV systolic function        Recommendations        Medical treatments    Risk factor modification. Objective:   Vitals: BP (!) 145/76   Pulse 80   Temp 98.2 °F (36.8 °C) (Oral)   Resp 16   Ht 5' 4\" (1.626 m)   Wt (!) 300 lb 4.8 oz (136.2 kg)   SpO2 98%   BMI 51.55 kg/m²   General appearance: alert and cooperative with exam  HEENT: Head: Normocephalic, no lesions, without obvious abnormality. Neck:no JVD, trachea midline, no adenopathy  Lungs: Clear to auscultation  NC oxygen 1.5-2 L  Heart: Regular rate and rhythm, s1/s2 auscultated, no murmurs.   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 Stress and CATH reviewed by Dr Gisela Mandujano for pre-op risk stratification. Intermediate risk for Ortho surgery. Perfect Serve message sent to Ortho resident. 2. HTN - remains elevated. Continue Norvasc, Losartan, and Coreg   3. Keep K > 4.0 and Mag 2.0    4.    Rest of care managed per Primary Team.     Electronically signed by DINESH Scruggs NP on 10/26/2021 at 9:23 [address] [place] [address].  [phone]

## 2021-10-26 NOTE — PROGRESS NOTES
Physical Therapy        Physical Therapy Cancel Note      DATE: 10/26/2021    NAME: Sergei Torres  MRN: [id number]   : 1950      Patient not seen this date for Physical Therapy due to:    Surgery/Procedure: \"OR today with Dr. Nikki Gross for left knee explant/spacer placement pending Cardiac clearance\", Not cleared for OR by cardiology yesterday due to abnormal labs (Na 128)  and + stress test.      Electronically signed by Jj Braun PT on 10/26/2021 at 11:47 AM

## 2021-10-26 NOTE — PROGRESS NOTES
Occupational [address] [employer]  Occupational Therapy Not Seen Note    DATE: 10/26/2021    NAME: Arthur Frazier  MRN: [id number]   : 1950      Patient not seen this date for Occupational Therapy due to:    Surgery/Procedure: \"OR today with Dr. Roby Bonilla for left knee explant/spacer placement pending Cardiac clearance\", Not cleared for OR by cardiology yesterday due to abnormal labs (Na 128)  and + stress test.    Next Scheduled Treatment: Attempt on 10/27 as appropriate.     Electronically signed by Prince Malloy OT on 10/26/2021 at 7:39 AM

## 2021-10-27 ENCOUNTER — APPOINTMENT (OUTPATIENT)
Dept: GENERAL RADIOLOGY | Age: 71
DRG: 464 | End: 2021-10-27
Attending: STUDENT IN AN ORGANIZED HEALTH CARE EDUCATION/TRAINING PROGRAM
Payer: COMMERCIAL

## 2021-10-27 LAB
ABSOLUTE EOS #: <0.03 K/UL (ref 0–0.44)
ABSOLUTE IMMATURE GRANULOCYTE: 0.21 K/UL (ref 0–0.3)
ABSOLUTE LYMPH #: 0.86 K/UL (ref 1.1–3.7)
ABSOLUTE MONO #: 0.51 K/UL (ref 0.1–1.2)
ANION GAP SERPL CALCULATED.3IONS-SCNC: 11 MMOL/L (ref 9–17)
BASOPHILS # BLD: 0 % (ref 0–2)
BASOPHILS ABSOLUTE: 0.03 K/UL (ref 0–0.2)
BUN BLDV-MCNC: 25 MG/DL (ref 8–23)
BUN/CREAT BLD: ABNORMAL (ref 9–20)
CALCIUM SERPL-MCNC: 7.6 MG/DL (ref 8.6–10.4)
CHLORIDE BLD-SCNC: 102 MMOL/L (ref 98–107)
CO2: 17 MMOL/L (ref 20–31)
CREAT SERPL-MCNC: 0.98 MG/DL (ref 0.5–0.9)
CULTURE: NORMAL
DIFFERENTIAL TYPE: ABNORMAL
EOSINOPHILS RELATIVE PERCENT: 0 % (ref 1–4)
GFR AFRICAN AMERICAN: >60 ML/MIN
GFR NON-AFRICAN AMERICAN: 56 ML/MIN
GFR SERPL CREATININE-BSD FRML MDRD: ABNORMAL ML/MIN/{1.73_M2}
GFR SERPL CREATININE-BSD FRML MDRD: ABNORMAL ML/MIN/{1.73_M2}
GLUCOSE BLD-MCNC: 166 MG/DL (ref 65–105)
GLUCOSE BLD-MCNC: 206 MG/DL (ref 65–105)
GLUCOSE BLD-MCNC: 237 MG/DL (ref 65–105)
GLUCOSE BLD-MCNC: 263 MG/DL (ref 65–105)
GLUCOSE BLD-MCNC: 264 MG/DL (ref 70–99)
HCT VFR BLD CALC: 25.4 % (ref 36.3–47.1)
HEMOGLOBIN: 7.5 G/DL (ref 11.9–15.1)
IMMATURE GRANULOCYTES: 2 %
LYMPHOCYTES # BLD: 7 % (ref 24–43)
Lab: NORMAL
MCH RBC QN AUTO: 26.7 PG (ref 25.2–33.5)
MCHC RBC AUTO-ENTMCNC: 29.5 G/DL (ref 28.4–34.8)
MCV RBC AUTO: 90.4 FL (ref 82.6–102.9)
MONOCYTES # BLD: 4 % (ref 3–12)
NRBC AUTOMATED: 0 PER 100 WBC
PDW BLD-RTO: 14.6 % (ref 11.8–14.4)
PLATELET # BLD: 269 K/UL (ref 138–453)
PLATELET ESTIMATE: ABNORMAL
PMV BLD AUTO: 10.2 FL (ref 8.1–13.5)
POTASSIUM SERPL-SCNC: 5.5 MMOL/L (ref 3.7–5.3)
RBC # BLD: 2.81 M/UL (ref 3.95–5.11)
RBC # BLD: ABNORMAL 10*6/UL
SEG NEUTROPHILS: 86 % (ref 36–65)
SEGMENTED NEUTROPHILS ABSOLUTE COUNT: 10.07 K/UL (ref 1.5–8.1)
SODIUM BLD-SCNC: 130 MMOL/L (ref 135–144)
SPECIMEN DESCRIPTION: NORMAL
WBC # BLD: 11.7 K/UL (ref 3.5–11.3)
WBC # BLD: ABNORMAL 10*3/UL

## 2021-10-27 PROCEDURE — 82947 ASSAY GLUCOSE BLOOD QUANT: CPT

## 2021-10-27 PROCEDURE — 6360000002 HC RX W HCPCS

## 2021-10-27 PROCEDURE — 36415 COLL VENOUS BLD VENIPUNCTURE: CPT

## 2021-10-27 PROCEDURE — 6370000000 HC RX 637 (ALT 250 FOR IP)

## 2021-10-27 PROCEDURE — 99232 SBSQ HOSP IP/OBS MODERATE 35: CPT | Performed by: INTERNAL MEDICINE

## 2021-10-27 PROCEDURE — 2580000003 HC RX 258

## 2021-10-27 PROCEDURE — 97166 OT EVAL MOD COMPLEX 45 MIN: CPT

## 2021-10-27 PROCEDURE — 1200000000 HC SEMI PRIVATE

## 2021-10-27 PROCEDURE — 97530 THERAPEUTIC ACTIVITIES: CPT

## 2021-10-27 PROCEDURE — 97162 PT EVAL MOD COMPLEX 30 MIN: CPT

## 2021-10-27 PROCEDURE — 71045 X-RAY EXAM CHEST 1 VIEW: CPT

## 2021-10-27 PROCEDURE — 80048 BASIC METABOLIC PNL TOTAL CA: CPT

## 2021-10-27 PROCEDURE — 6360000002 HC RX W HCPCS: Performed by: INTERNAL MEDICINE

## 2021-10-27 PROCEDURE — 85025 COMPLETE CBC W/AUTO DIFF WBC: CPT

## 2021-10-27 PROCEDURE — 97535 SELF CARE MNGMENT TRAINING: CPT

## 2021-10-27 RX ORDER — FENTANYL CITRATE 50 UG/ML
50 INJECTION, SOLUTION INTRAMUSCULAR; INTRAVENOUS
Status: DISCONTINUED | OUTPATIENT
Start: 2021-10-27 | End: 2021-10-29

## 2021-10-27 RX ADMIN — OXYCODONE HYDROCHLORIDE AND ACETAMINOPHEN 1 TABLET: 5; 325 TABLET ORAL at 19:20

## 2021-10-27 RX ADMIN — PANTOPRAZOLE SODIUM 40 MG: 40 TABLET, DELAYED RELEASE ORAL at 06:28

## 2021-10-27 RX ADMIN — OXYCODONE HYDROCHLORIDE AND ACETAMINOPHEN 1 TABLET: 5; 325 TABLET ORAL at 06:28

## 2021-10-27 RX ADMIN — AMLODIPINE BESYLATE 10 MG: 10 TABLET ORAL at 08:23

## 2021-10-27 RX ADMIN — INSULIN GLARGINE 30 UNITS: 100 INJECTION, SOLUTION SUBCUTANEOUS at 21:18

## 2021-10-27 RX ADMIN — INSULIN LISPRO 1 UNITS: 100 INJECTION, SOLUTION INTRAVENOUS; SUBCUTANEOUS at 21:17

## 2021-10-27 RX ADMIN — INSULIN LISPRO 4 UNITS: 100 INJECTION, SOLUTION INTRAVENOUS; SUBCUTANEOUS at 16:07

## 2021-10-27 RX ADMIN — CEFAZOLIN 2000 MG: 10 INJECTION, POWDER, FOR SOLUTION INTRAVENOUS at 09:20

## 2021-10-27 RX ADMIN — CARVEDILOL 3.12 MG: 3.12 TABLET, FILM COATED ORAL at 08:23

## 2021-10-27 RX ADMIN — SODIUM CHLORIDE, PRESERVATIVE FREE 10 ML: 5 INJECTION INTRAVENOUS at 21:31

## 2021-10-27 RX ADMIN — SODIUM CHLORIDE, PRESERVATIVE FREE 10 ML: 5 INJECTION INTRAVENOUS at 08:25

## 2021-10-27 RX ADMIN — CARVEDILOL 3.12 MG: 3.12 TABLET, FILM COATED ORAL at 18:25

## 2021-10-27 RX ADMIN — INSULIN LISPRO 4 UNITS: 100 INJECTION, SOLUTION INTRAVENOUS; SUBCUTANEOUS at 12:11

## 2021-10-27 RX ADMIN — CEFTRIAXONE SODIUM 2000 MG: 2 INJECTION, POWDER, FOR SOLUTION INTRAMUSCULAR; INTRAVENOUS at 08:23

## 2021-10-27 RX ADMIN — SODIUM CHLORIDE, PRESERVATIVE FREE 10 ML: 5 INJECTION INTRAVENOUS at 08:23

## 2021-10-27 RX ADMIN — ENOXAPARIN SODIUM 40 MG: 40 INJECTION SUBCUTANEOUS at 09:23

## 2021-10-27 RX ADMIN — ENOXAPARIN SODIUM 30 MG: 30 INJECTION SUBCUTANEOUS at 21:18

## 2021-10-27 RX ADMIN — OXYCODONE HYDROCHLORIDE AND ACETAMINOPHEN 1 TABLET: 5; 325 TABLET ORAL at 12:11

## 2021-10-27 RX ADMIN — FENTANYL CITRATE 50 MCG: 50 INJECTION, SOLUTION INTRAMUSCULAR; INTRAVENOUS at 15:02

## 2021-10-27 RX ADMIN — CEFAZOLIN 2000 MG: 10 INJECTION, POWDER, FOR SOLUTION INTRAVENOUS at 00:00

## 2021-10-27 RX ADMIN — OXYCODONE HYDROCHLORIDE AND ACETAMINOPHEN 1 TABLET: 5; 325 TABLET ORAL at 02:50

## 2021-10-27 RX ADMIN — SODIUM CHLORIDE, PRESERVATIVE FREE 10 ML: 5 INJECTION INTRAVENOUS at 21:32

## 2021-10-27 RX ADMIN — INSULIN LISPRO 6 UNITS: 100 INJECTION, SOLUTION INTRAVENOUS; SUBCUTANEOUS at 08:31

## 2021-10-27 RX ADMIN — LOSARTAN POTASSIUM 100 MG: 50 TABLET, FILM COATED ORAL at 08:23

## 2021-10-27 ASSESSMENT — PAIN SCALES - GENERAL
PAINLEVEL_OUTOF10: 10
PAINLEVEL_OUTOF10: 9
PAINLEVEL_OUTOF10: 10
PAINLEVEL_OUTOF10: 10
PAINLEVEL_OUTOF10: 8
PAINLEVEL_OUTOF10: 9
PAINLEVEL_OUTOF10: 10
PAINLEVEL_OUTOF10: 8
PAINLEVEL_OUTOF10: 3

## 2021-10-27 ASSESSMENT — PAIN DESCRIPTION - ORIENTATION
ORIENTATION: LEFT

## 2021-10-27 ASSESSMENT — PAIN DESCRIPTION - PAIN TYPE
TYPE: SURGICAL PAIN

## 2021-10-27 ASSESSMENT — PAIN DESCRIPTION - ONSET: ONSET: ON-GOING

## 2021-10-27 ASSESSMENT — PAIN DESCRIPTION - DESCRIPTORS
DESCRIPTORS: ACHING;DISCOMFORT;SORE;TENDER
DESCRIPTORS: DISCOMFORT

## 2021-10-27 ASSESSMENT — PAIN DESCRIPTION - PROGRESSION: CLINICAL_PROGRESSION: NOT CHANGED

## 2021-10-27 ASSESSMENT — PAIN DESCRIPTION - LOCATION
LOCATION: KNEE

## 2021-10-27 ASSESSMENT — PAIN DESCRIPTION - FREQUENCY
FREQUENCY: CONTINUOUS
FREQUENCY: CONTINUOUS

## 2021-10-27 NOTE — ANESTHESIA POSTPROCEDURE EVALUATION
Department of Anesthesiology  Postprocedure Note    Patient: Cl Mcdonald  MRN: [id number]  YOB: 1950  Date of evaluation: 10/27/2021  Time:  6:40 AM     Procedure Summary     Date: 10/26/21 Room / Location: [address]    Anesthesia Start: [facility] Anesthesia Stop: 1944    Procedure: KNEE EXPLANT, [address], STATIC ANTIBIOTIC SPACER, LINK ORTHO FUSION NAIL SET- (Left ) Diagnosis: (LEFT KNEE PERIPROSTHETIC INFECTIONS)    Surgeons: Leah Brizuela DO Responsible Provider: Aj Han MD    Anesthesia Type: general, regional ASA Status: 3          Anesthesia Type: general, regional    Deedee Phase I: Deedee Score: 9    Deedee Phase II:      Last vitals: Reviewed and per EMR flowsheets.        Anesthesia Post Evaluation    Patient location during evaluation: PACU  Patient participation: complete - patient participated  Level of consciousness: awake and alert  Pain score: 5  Airway patency: patent  Nausea & Vomiting: no nausea and no vomiting  Complications: no  Cardiovascular status: hemodynamically stable  Respiratory status: acceptable, spontaneous ventilation and nasal cannula  Hydration status: euvolemic

## 2021-10-27 NOTE — CARE COORDINATION
Transition planning  Spoke with Vanessa at [facility] re: referral, she states they do not have any beds available. [address] and spoke with Maria Esther Hope at [facility] re: referral. Faxed H&P, clinicals, PT note and MAR per request to [place] to [phone].

## 2021-10-27 NOTE — PLAN OF CARE
Problem: Pain:  Goal: Pain level will decrease  Description: Pain level will decrease  10/27/2021 1017 by Em Blum RN  Outcome: Ongoing  10/27/2021 0747 by Alma Gutiérrez RN  Outcome: Ongoing  Goal: Control of acute pain  Description: Control of acute pain  10/27/2021 1017 by Em Blum RN  Outcome: Ongoing  10/27/2021 0747 by Alma Gutiérrez RN  Outcome: Ongoing  Goal: Control of chronic pain  Description: Control of chronic pain  10/27/2021 1017 by Em Blum RN  Outcome: Ongoing  10/27/2021 0747 by Alma Gutiérrez RN  Outcome: Ongoing     Problem: Falls - Risk of:  Goal: Will remain free from falls  Description: Will remain free from falls  10/27/2021 1017 by Em Blum RN  Outcome: Ongoing  10/27/2021 0747 by Alma Gutiérrez RN  Outcome: Ongoing  Goal: Absence of physical injury  Description: Absence of physical injury  10/27/2021 1017 by Em Blum RN  Outcome: Ongoing  10/27/2021 0747 by Alma Gutiérrez RN  Outcome: Ongoing     Problem: Skin Integrity:  Goal: Will show no infection signs and symptoms  Description: Will show no infection signs and symptoms  10/27/2021 1017 by Em Blum RN  Outcome: Ongoing  10/27/2021 0747 by Alma Gutiérrez RN  Outcome: Ongoing  Goal: Absence of new skin breakdown  Description: Absence of new skin breakdown  10/27/2021 1017 by Em Blum RN  Outcome: Ongoing  10/27/2021 0747 by Alma Gutiérrez RN  Outcome: Ongoing

## 2021-10-27 NOTE — PROGRESS NOTES
°C)    Recent Labs     10/26/21  1254 10/26/21  2057 10/26/21  2229 10/27/21  0818   POCGLU 99 132* 151* 263*       I/O (24Hr): Intake/Output Summary (Last 24 hours) at 10/27/2021 0840  Last data filed at 10/27/2021 9151  Gross per 24 hour   Intake 1500 ml   Output 750 ml   Net 750 ml       Labs:  Hematology:  Recent Labs     10/25/21  0504 10/26/21  0443 10/27/21  0544   WBC 7.9 7.8 11.7*   RBC 3.31* 3.15* 2.81*   HGB 8.9* 8.5* 7.5*   HCT 29.8* 29.1* 25.4*   MCV 90.0 92.4 90.4   MCH 26.9 27.0 26.7   MCHC 29.9 29.2 29.5   RDW 14.6* 14.7* 14.6*    278 269   MPV 9.9 9.8 10.2   CRP  --  141.8*  --      Chemistry:  Recent Labs     10/25/21  0504 10/26/21  0443 10/27/21  0544   * 135 130*   K 5.2 4.8 5.5*   CL 99 104 102   CO2 21 21 17*   GLUCOSE 130* 114* 264*   BUN 23 22 25*   CREATININE 0.91* 0.78 0.98*   ANIONGAP 8* 10 11   LABGLOM >60 >60 56*   GFRAA >60 >60 >60   CALCIUM 8.2* 8.4* 7.6*     Recent Labs     10/26/21  0855 10/26/21  1135 10/26/21  1254 10/26/21  2057 10/26/21  2229 10/27/21  0818   POCGLU 107* 98 99 132* 151* 263*     ABG:No results found for: POCPH, PHART, PH, POCPCO2, DIF0LZB, PCO2, POCPO2, PO2ART, PO2, POCHCO3, LZX1MOZ, HCO3, NBEA, PBEA, BEART, BE, THGBART, THB, CJA5KIT, YKRD1KAW, C4ACYPNE, O2SAT, FIO2  Lab Results   Component Value Date/Time    SPECIAL RT 4ML 10/21/2021 11:00 PM     Lab Results   Component Value Date/Time    CULTURE NO GROWTH 6 DAYS 10/21/2021 11:00 PM       Radiology:  XR FEMUR LEFT (MIN 2 VIEWS)    Result Date: 10/22/2021  1. No acute osseous abnormality in the left femur or tibia/fibula. 2.  Left knee arthroplasty in place without evidence for hardware loosening. 3.  Leg length measurements provided above. Please note limitations and landmarks used. XR KNEE LEFT (3 VIEWS)    Result Date: 10/22/2021  1. Soft tissue edema and effusion. 2. No acute osseous abnormality. XR TIBIA FIBULA LEFT (2 VIEWS)    Result Date: 10/22/2021  1.   No acute osseous abnormality in the left femur or tibia/fibula. 2.  Left knee arthroplasty in place without evidence for hardware loosening. 3.  Leg length measurements provided above. Please note limitations and landmarks used. XR CHEST PORTABLE    Result Date: 10/22/2021  1. Aberrant positioning of the right upper extremity PICC line. Tip is either within the distal left subclavian vein, or directed into the azygous vein. Repositioning is recommended 2. Report was marked to be called to a licensed caregiver     NM Cardiac Stress Test Nuclear Imaging    Result Date: 10/25/2021  Perfusion:  Stress-induced reversible defects as above involving 12% of the left ventricular myocardium at stress. Function:  Normal left ventricular ejection fraction of 55%. Mild perfusion defect in the apex and mid inferior wall. Risk stratification: HIGH DUE TO PERFUSION DEFECTS. Notes concerning risk stratification: Risk stratification incorporates both clinical history and some testing results. Final risk determination is the responsibility of the ordering provider as other patient information and test results may increase or decrease the risk assessment reported for this examination. Risk stratification criteria are adapted from \"Noninvasive Risk Stratification\" criteria from [place]. Al, ACC/AATS/AHA/ASE/ASNC/SCAI/SCCT/STS 2017 Appropriate Use Criteria For Coronary Revascularization in Patients With Stable Ischemic Heart Disease [facility] Volume 69, Issue 17, May 2017 High risk (>3% annual death or MI) 1. Severe resting LV dysfunction (LVEF <35%) not readily explained by non coronary causes 2. Resting perfusion abnormalities greater than 10% of the myocardium in patients without prior history or evidence of MI3. Stress-induced perfusion abnormalities encumbering greater than or equal to 10% myocardium or stress segmental scores indicating multiple vascular territories with abnormalities 4.  Stress-induced LV dilatation (TID ratio greater than 1.19 for exercise and greater than 1.39 for regadenoson) Intermediate risk (1% to 3% annual death or MI) 1. Mild/moderate resting LV dysfunction (LVEF 35% to 49%) not readily explained by non coronary causes. 2. Resting perfusion abnormalities in 5%-9.9% of the myocardium in patients without a history or prior evidence of MI 3. Stress-induced perfusion abnormality encumbering 5%-9.9% of the myocardium or stress segmental scores indicating 1 vascular territory with abnormalities but without LV dilation 4. Small wall motion abnormality involving 1-2 segments and only 1 coronary bed. Low Risk (Less than 1% annual death or MI) 1. Normal or small myocardial perfusion defect at rest or with stress encumbering less than 5% of the myocardium. Physical Examination:        General appearance:  alert, cooperative and no distress  Mental Status:  oriented to person, place and time and normal affect  Lungs:  clear to auscultation bilaterally, normal effort  Heart:  regular rate and rhythm, no murmur  Abdomen:  soft, nontender, nondistended, normal bowel sounds, no masses, hepatomegaly, splenomegaly  Extremities:  no edema, redness, tenderness in the calves  Skin:  no gross lesions, rashes, induration.  Knee incision c/d/i    Assessment:        Hospital Problems         Last Modified POA    * (Principal) Staphylococcal arthritis of left knee ([address] 10/22/2021 Yes    Type 2 diabetes mellitus without complication, without long-term current use of insulin ([address] 10/22/2021 Yes    PITA on CPAP 10/22/2021 Yes    CAD (coronary artery disease) 10/22/2021 Yes    COPD (chronic obstructive pulmonary disease) ([address] 10/22/2021 Yes    Primary hypertension 10/22/2021 Yes    Acute kidney injury superimposed on CKD ([address] 10/22/2021 Yes    Normocytic normochromic anemia 10/22/2021 Yes          Plan:        Left knee periprosthetic joint infection: S/o knee explant, Irrigation/debridement, with placement of static antibiotic spacer and ortho fusion nail on 10/27. Continue Rocephin 2 gm IV every 24 hours  Blood cultures NGDT x1. Acute Kidney injury on Chronic Kidney disease stage 1: Resolved. Scr back to baseline  Non anion gap metabolic acidosis: stop normal saline  Mild Hyperkalemia: Monitor potassium. Hyponatremia: 2/2 osmotic drag from hyperglycemia. Chronic Hyponatremia: resolved  Normocytic normochromic anemia: Blood loss anemia+/-functional NOY and anemia of inflammation. Tsat 9%. Iron therapy once acute infection resolves. Monitor Hgb, transfuse prn for Hgb <7. Diabetes mellitus type II with Hyperglycemia: Continue Lantus 30 units QHS, ISS  Morbid Obesity BMI of 51: recommend weight loss and lifestyle modification  Uncontrolled Hypertension-improving: continue home antihypertensive regimen. Continue Coreg 3.125, Norvasc, Losartan.    Suspected PITA: needs outpt sleep study  DVT ppx  PT/OT    Dinora Treviño DO  10/27/2021  8:40 AM

## 2021-10-27 NOTE — PROGRESS NOTES
[facility]  Progress Note                   Date:   10/27/2021  Patient name: Silvestre Naidu  Date of admission:  10/21/2021 10:51 PM  MRN:   [id number]  YOB: 1950  PCP: Faith Dill    Reason for Admission: Septic arthritis of knee, bilateral ([facility] [address] [M00.9]    Subjective:       Clinical Changes /Abnormalities:  Patient seen and examined in room after discussion with RN. Denies chest pain or SOB. NO CV issues overnight. Review of Systems    Medications:   Scheduled Meds:   enoxaparin  40 mg SubCUTAneous Daily    ceFAZolin  2,000 mg IntraVENous Q8H    amLODIPine  10 mg Oral Daily    carvedilol  3.125 mg Oral BID WC    sodium chloride flush  5-40 mL IntraVENous 2 times per day    sodium chloride flush  5-40 mL IntraVENous 2 times per day    insulin glargine  30 Units SubCUTAneous Nightly    losartan  100 mg Oral Daily    pantoprazole  40 mg Oral QAM AC    insulin lispro  0-12 Units SubCUTAneous TID WC    cefTRIAXone (ROCEPHIN) IV  2,000 mg IntraVENous Q24H    insulin lispro  0-6 Units SubCUTAneous Nightly     Continuous Infusions:   sodium chloride      sodium chloride      dextrose      sodium chloride       CBC:   Recent Labs     10/25/21  0504 10/26/21  0443 10/27/21  0544   WBC 7.9 7.8 11.7*   HGB 8.9* 8.5* 7.5*    278 269     BMP:    Recent Labs     10/25/21  0504 10/26/21  0443 10/27/21  0544   * 135 130*   K 5.2 4.8 5.5*   CL 99 104 102   CO2 21 21 17*   BUN 23 22 25*   CREATININE 0.91* 0.78 0.98*   GLUCOSE 130* 114* 264*     Hepatic:No results for input(s): AST, ALT, ALB, BILITOT, ALKPHOS in the last 72 hours. Troponin: No results for input(s): TROPHS in the last 72 hours. BNP: No results for input(s): BNP in the last 72 hours. Lipids: No results for input(s): CHOL, HDL in the last 72 hours. Invalid input(s): LDLCALCU  INR: No results for input(s): INR in the last 72 hours.     DATA:    Diagnostics:       EKG: NSR, NL ECG     STRESS 10/25/2021  Perfusion:  Stress-induced reversible defects as above involving 12% of the   left ventricular myocardium at stress.       Function:  Normal left ventricular ejection fraction of 55%.  Mild perfusion   defect in the apex and mid inferior wall.       Risk stratification: HIGH DUE TO PERFUSION DEFECTS. CATH 10/25/2021  Findings:    LMCA: Normal 0% stenosis. LAD: Normal 0% stenosis. LCx: Normal 0% stenosis. RCA: Normal 0% stenosis.      Coronary Tree Dominance: Right       LV Analysis LV function assessed as:Normal.   The LV gram was performed in the PARADA 30 position. LVEF: 55%.       Conclusions:   Normal coronary arteries    Preserved LV systolic function        Recommendations        Medical treatments    Risk factor modification. Objective:   Vitals: BP (!) 127/58   Pulse 79   Temp 98.1 °F (36.7 °C) (Oral)   Resp 18   Ht 5' 4\" (1.626 m)   Wt (!) 304 lb (137.9 kg)   SpO2 96%   BMI 52.18 kg/m²   General appearance: alert and cooperative with exam  HEENT: Head: Normocephalic, no lesions, without obvious abnormality. Neck:no JVD, trachea midline, no adenopathy  Lungs: Clear to auscultation  NC oxygen 1.5-2 L  Heart: Regular rate and rhythm, s1/s2 auscultated, no murmurs.   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 [address]     Primary hypertension     Acute kidney injury superimposed on CKD (HCC)     Normocytic normochromic anemia      Plan of Treatment:   1. Post op, no CV issues. 2. HTN - stable. Continue Norvasc, Losartan, and Coreg   3. Keep K > 4.0 and Mag 2.0    4. Will sign off.   Please call with further questions or concerns     Electronically signed by DINESH Garcia CNP on 10/27/2021 at 9:08 [facility].  [phone]

## 2021-10-27 NOTE — PROGRESS NOTES
Physical Therapy    Facility/Department: [address] ORTHO/MED SURG  Initial Assessment    NAME: Arthur Frazier  : 1950  MRN: [id number]    Date of Service: 10/27/2021  Impression 80 y.o. female with left knee PJI s/p I&D with fusion nail on 10/26, POD1  Discharge Recommendations:  Patient would benefit from continued therapy after discharge   Assessment   Body structures, Functions, Activity limitations: Decreased functional mobility ; Decreased strength;Decreased endurance  Assessment: The pt transferred to standing with mod assist x 2. She reported a significant amount of fatigue with mobilization. She could benefit from a continuation of PT for gait and strengthening following her DC  Prognosis: Good  Decision Making: Medium Complexity  PT Education: Goals;PT Role;Plan of Care  REQUIRES PT FOLLOW UP: Yes  Activity Tolerance  Activity Tolerance: Patient limited by fatigue       Patient Diagnosis(es): There were no encounter diagnoses. has a past medical history of Arthritis, Diabetes mellitus ([address] Hyperlipidemia, and Hypertension. has a past surgical history that includes Hysterectomy and knee surgery (Left, 10/26/2021).     Restrictions  Restrictions/Precautions  Restrictions/Precautions: Weight Bearing  Lower Extremity Weight Bearing Restrictions  Left Lower Extremity Weight Bearing: Partial Weight Bearing  Partial Weight Bearing Percentage Or Pounds: 50% WB L LE  Position Activity Restriction  Other position/activity restrictions: Do not attempt to bend through L knee  Vision/Hearing  Vision: Within Functional Limits  Vision Exceptions: Wears glasses for reading  Hearing: Within functional limits     Subjective  General  Patient assessed for rehabilitation services?: Yes  Response To Previous Treatment: Not applicable  Family / Caregiver Present: No  Follows Commands: Within Functional Limits  Subjective  Subjective: RN and pt agreeable to PT eval  Pain Screening  Patient Currently in Pain: Yes  Pain Assessment  Pain Assessment: 0-10  Pain Level: 9  Pain Location: Knee  Pain Orientation: Left  Vital Signs  Patient Currently in Pain: Yes       Orientation  Orientation  Overall Orientation Status: Within Normal Limits  Social/Functional History  Social/Functional History  Lives With: Alone  Type of Home: Trailer  Home Layout: One level  Home Access: Stairs to enter without rails, Ramped entrance  Entrance Stairs - Number of Steps: 5  Bathroom Shower/Tub: Tub/Shower unit  Bathroom Toilet: Standard  Bathroom Equipment: Grab bars in shower, Shower chair (Grab bars are attached to wall however wall moves/flexes unsafely)  Home Equipment: Jo Mary, Electric scooter, 4 wheeled walker, Rolling walker (uses electric scooter for community distances only, uses 4ww in home)  ADL Assistance: [address] [place] [address] Avenue: Independent  Homemaking Responsibilities: Yes  Ambulation Assistance: Independent  Transfer Assistance: Independent  Active : Yes  Occupation: Retired  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, 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, painting  Cognition      Objective     AROM RLE (degrees)  RLE AROM: WFL  PROM LLE (degrees)  LLE General PROM: N/T  AROM LLE (degrees)  LLE AROM : WFL  PROM RUE (degrees)  RUE PROM: WFL  Strength RLE  Strength RLE: WFL  Strength LLE  Comment: N/T  Strength RUE  Strength RUE: WFL  Strength LUE  Strength LUE: WFL     Sensation  Overall Sensation Status: WFL  Bed mobility  Rolling to Left: Minimal assistance  Rolling to Right: Minimal assistance  Supine to Sit: Minimal assistance  Transfers  Sit to Stand: Moderate Assistance;2 Person Assistance  Stand to sit: Moderate Assistance;2 Person Assistance  Ambulation  Ambulation?: No  Ambulation 1  Surface: level tile  Device: Rolling Walker  Assistance: Moderate assistance;2 Person assistance  Distance: sit to stand with mod assist x 2  Comments:  The pt was unable to take any steps     Balance  Posture: Good  Sitting - Static: Fair  Sitting - Dynamic: Poor  Standing - Static: Poor        Plan   Plan  Times per week: 5-6x wk  Current Treatment Recommendations: Strengthening, Functional Mobility Training, Transfer Training, Gait Training, Safety Education & Training, Endurance Training, Stair training  Safety Devices  Type of devices: Nurse notified, Left in bed, Call light within reach, Patient at risk for falls, Bed alarm in place    G-Code     OutComes Score     AM-PAC Score  AM-PAC Inpatient Mobility Raw Score : 13 (10/27/21 1056)  AM-PAC Inpatient T-Scale Score : 36.74 (10/27/21 1056)  Mobility Inpatient CMS 0-100% Score: 64.91 (10/27/21 1056)  Mobility Inpatient CMS G-Code Modifier : CL (10/27/21 1056)          Goals  Short term goals  Time Frame for Short term goals: 10 visits  Short term goal 1: transfers with SBA  Short term goal 2: amb 50 ft with a RW x SBA  Short term goal 3: ascend/descend 4 steps with SBA  Short term goal 4: 20 min exercise program x SBA  Patient Goals   Patient goals : Return home     1 of 10  Therapy Time   Individual Concurrent Group Co-treatment   Time In 0745         Time Out 0810         Minutes [facility], PT

## 2021-10-27 NOTE — PROGRESS NOTES
Physician Progress Note      Maksim Power  [facility] #:                  [id number]  :                       1950  ADMIT DATE:       10/21/2021 10:51 PM  DISCH DATE:  RESPONDING  PROVIDER #:        Kim Armenta        QUERY TEXT:    Stage of Chronic Kidney Disease: Please provide further specificity, if known. Clinical indicators include: ckd, bun, cr, creatinine, bnp, scr  Options provided:  -- Chronic kidney disease stage 1  -- Chronic kidney disease stage 2  -- Chronic kidney disease stage 3  -- Chronic kidney disease stage 3a  -- Chronic kidney disease stage 3b  -- Chronic kidney disease stage 4  -- Chronic kidney disease stage 5  -- Chronic kidney disease stage 5, requiring dialysis  -- End stage renal disease  -- Other - I will add my own diagnosis  -- Disagree - Not applicable / Not valid  -- Disagree - Clinically Unable to determine / Unknown        PROVIDER RESPONSE TEXT:    The patient has chronic kidney disease stage 1. Electronically signed by:   Kim Armenta 10/27/2021 8:49 AM

## 2021-10-27 NOTE — PROGRESS NOTES
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 Scheduled explantation and antibiotic spacer device placement 10-25-21. Medical Decision Making/Summary/Discussion:10/27/2021       Infection Control Recommendations   [place] Precautions      Antimicrobial Stewardship Recommendations     Simplification of therapy    Coordination of Outpatient Care:   Estimated Length of IV antimicrobials:4 weeks  Patient will need Midline Catheter Insertion: Yes  Patient will need PICC line Insertion:No  Patient will need: Home IV , [place],  SNF,  LTAC: TBD  Patient will need outpatient wound care:Yes    Chief complaint/reason for consultation:   Lt knee pyogenic arthritis      History of Present Illness:   Cherylene Gelineau is a 80y.o.-year-old female who was initially admitted on 10/21/2021. Patient seen at the request of Dr. Christopher Morales. INITIAL HISTORY:    Patient with a Hx of prior Lt TKA  In 2007 at [facility] under Dr Samira Pugh, She subsequently experienced  Lt knee infections which required additional surgeries x 2. She has underlying DM 2, Essential HTN, CKD,PITA and COPD. Presented to [facility] because of acute onset of pain with ambulation. The knee joint was aspirated and grew Strep. Agalactiae. She was started on antibiotics (ceftriaxone) and was awaiting evaluation at tertiary care center because of her Hx of prior surgeries and infections. [facility] refused transfer. She presented to [facility] because of ongoing severe pain, erythema and inflammation.     Ortho has evaluated and plan intervention 40 mg Oral QAM AC    insulin lispro  0-12 Units SubCUTAneous TID WC    cefTRIAXone (ROCEPHIN) IV  2,000 mg IntraVENous Q24H    insulin lispro  0-6 Units SubCUTAneous Nightly       Social History:     Social History     Socioeconomic History    Marital status:      Spouse name: Not on file    Number of children: Not on file    Years of education: Not on file    Highest education level: Not on file   Occupational History    Not on file   Tobacco Use    Smoking status: Never Smoker   Substance and Sexual Activity    Alcohol use: No    Drug use: No    Sexual activity: Not on file   Other Topics Concern    Not on file   Social History Narrative    Not on file     Social Determinants of Health     Financial Resource Strain:     Difficulty of Paying Living Expenses:    Food Insecurity:     Worried About Running Out of Food in the Last Year:     [address] in the Last Year:    Transportation Needs:     Lack of Transportation (Medical):  Lack of Transportation (Non-Medical):    Physical Activity:     Days of Exercise per Week:     Minutes of Exercise per Session:    Stress:     Feeling of Stress :    Social Connections:     Frequency of Communication with Friends and Family:     Frequency of Social Gatherings with Friends and Family:     Attends Hoahaoism Services:     Active Member of Clubs or Organizations:     Attends Club or Organization Meetings:     Marital Status:    Intimate Partner Violence:     Fear of Current or Ex-Partner:     Emotionally Abused:     Physically Abused:     Sexually Abused:        Family History:   No family history on file. Allergies:   Bactrim [sulfamethoxazole-trimethoprim], Codeine, and Lisinopril     Review of Systems:   Constitutional: No fevers or chills. No systemic complaints  Head: No headaches  Eyes: No double vision or blurry vision. No conjunctival inflammation. ENT: No sore throat or runny nose. . No hearing loss, tinnitus or (Total leg length of approximately 75.2 cm from superior tip of greater   trochanter to mid tibial plafond).  The liner measures up to 2 cm in   thickness.       No acute fracture involving the tibia or fibula.  No periprosthetic lucency   in the proximal tibia.  Ankle mortise alignment is preserved.  Scattered   dystrophic calcifications in the leg.           Impression   1.  No acute osseous abnormality in the left femur or tibia/fibula.       2.  Left knee arthroplasty in place without evidence for hardware loosening.       3.  Leg length measurements provided above.  Please note limitations and   landmarks used.         CT extremity lower left with contrast    Result Date: 10/18/2021  History: Acute left lower leg and ankle redness Exam/Technique: Thin axial images through the left lower extremity were obtained from the superior aspect of the acetabulum to the left foot following the intravenous demonstration of 100 mL Omnipaque 300. Study was supplemented by sagittal and coronal reconstructed images. Comparison: None Findings: There is a left knee arthroplasty in place. Full evaluation of the area of the knee is compromised by extensive metallic artifact. There is a joint effusion of the left knee with fluid seen in the supra patellar bursa. There is no evidence for an acute osseous abnormality. No lytic or blastic processes are seen. No evidence of osteolysis demonstrated. No soft tissue masses or fluid collections are identified. IMPRESSION: 1. Left knee joint effusion with a left knee arthroplasty in place. 2. Otherwise normal CT of the left lower extremity.  Workstation:[id number] Finalized by Antonietta Fernando MD on 10/18/2021     Medical Decision [id number]:   Microbiology Results   Procedure Component Value Units Date/Time   Body fluid culture includes gram stain [[id number]] (Abnormal) Collected: 10/18/21 1210   Specimen: Fluid Updated: 10/19/21 1252   Gram Stain Result WHITE BLOOD CELLS PRESENT FEW GRAM POSITIVE COCCI   QUANTITY NOT SUFFICIENT TO CONCENTRATE INTERPRET RESULTS WITH CAUTION   A Negative report does not exclude the possibility of infection because results are dependent on adequate specimen collection. Culture RARE STREPTOCOCCUS AGALACTIAE (GROUP B)   SARS COV 2 (COVID-19) [facility] Onsite Test [[id number]] Collected: 10/18/21 0253   Specimen: Nasopharynx Updated: 10/18/21 0335   Specimen Naso Pharynx   Sent to Testing to be performed at [address] [place] [address]. COVID-19 [facility] Report to Follow. SARS CoV 2 [[id number]] Collected: 10/18/21 0253   Specimen: Nasopharynx Updated: 10/18/21 0336   First Test? UNKNOWN   Employed in Healthcare? UNKNOWN   Symptoms Defined by CDC? NO   Symptom Onset? U^UNKNOWN   Hospitalized for Covid? UNKNOWN   ICU for Covid? UNKNOWN   Congregate Setting? UNKNOWN   Pregnant? NO   Specimen Type Naso Pharynx   SARS COV 2 Presumptive Negative     Medical Decision Making-Other:     Note:  Labs, medications, radiologic studies were reviewed with personal review of films   Large amounts of data were reviewed  Discussed with nursing Staff, Discharge planner  Infection Control and Prevention measures reviewed  All prior entries were reviewed  Administer medications as ordered  Prognosis: Guarded  Discharge planning reviewed  Follow up as outpatient. Thank you for allowing us to participate in the care of this patient. Please call with questions. Swapna Storm MD, PGY-1  Infectious Disease/ Internal Medicine Resident  [address], [place]      ATTESTATION:    I have discussed the case, including pertinent history and exam findings with the residents and students. I have seen and examined the patient and the key elements of the encounter have been performed by me. I was present when the student obtained his information or examined the patient. I have reviewed the laboratory data, other diagnostic studies and discussed them with the residents.  I have updated the medical record where necessary. I agree with the assessment, plan and orders as documented by the resident/ student.     Norman Taylor MD.    Pager: [phone] - Office: [phone]

## 2021-10-27 NOTE — PROGRESS NOTES
Occupational Therapy   Occupational Therapy Initial Assessment  Date: 10/27/2021   Patient Name: Suasna Hinojosa  MRN: [id number]     : 1950    Date of Service: 10/27/2021  Impression 71 y.o. female with left knee PJI s/p I&D with fusion nail on 10/26, POD1    Discharge Recommendations:  Patient would benefit from continued therapy after discharge       Assessment   Performance deficits / Impairments: Decreased functional mobility ; Decreased endurance;Decreased ADL status; Decreased balance;Decreased high-level IADLs  Prognosis: Good  Decision Making: Medium Complexity  OT Education: OT Role;Plan of Care;ADL Adaptive Strategies;Precautions;Transfer Training;Equipment  Patient Education: Pt very receptive to sock-aid/reacher use this date  REQUIRES OT FOLLOW UP: Yes  Activity Tolerance  Activity Tolerance: Patient limited by fatigue;Patient limited by pain  Safety Devices  Safety Devices in place: Yes  Type of devices: Call light within reach;Gait belt;Left in bed;Bed alarm in place;Nurse notified; Patient at risk for falls (RN informed that brakes on bed not functioning properly despite being \"on\")  Restraints  Initially in place: No         Patient Diagnosis(es): There were no encounter diagnoses. has a past medical history of Arthritis, Diabetes mellitus ([address] Hyperlipidemia, and Hypertension. has a past surgical history that includes Hysterectomy and knee surgery (Left, 10/26/2021).          Restrictions  Restrictions/Precautions  Restrictions/Precautions: Weight Bearing, General Precautions, Fall Risk, Up as Tolerated  Required Braces or Orthoses?: No  Lower Extremity Weight Bearing Restrictions  Left Lower Extremity Weight Bearing: Partial Weight Bearing  Partial Weight Bearing Percentage Or Pounds: 50% WB L LE  Position Activity Restriction  Other position/activity restrictions: Do not attempt to bend through L knee, 7.5 Hgb, up with A    Subjective   General  Patient assessed for rehabilitation services?: Yes  Family / Caregiver Present: Yes (Dtr arrived at end of session)  Diagnosis: L knee periprosthetic joint infection, antibiotic spacer placed, nailing on 10/26  Patient Currently in Pain: Yes  Pain Assessment  Pain Assessment: 0-10  Pain Level: 9  Pain Type: Surgical pain  Pain Location: Knee  Pain Orientation: Left  Pain Descriptors: Aching;Discomfort;Sore;Tender  Pain Frequency: Continuous  Non-Pharmaceutical Pain Intervention(s): Ambulation/Increased Activity; Distraction; Emotional support; Therapeutic presence  Response to Pain Intervention: Patient Satisfied  Vital Signs  Patient Currently in Pain: Yes  Social/Functional History  Social/Functional History  Lives With: Alone  Type of Home: Trailer  Home Layout: One level  Home Access: Stairs to enter without rails, Ramped entrance  Entrance Stairs - Number of Steps: 5  Bathroom Shower/Tub: Tub/Shower unit  Bathroom Toilet: Standard  Bathroom Equipment: Grab bars in shower, Shower chair (Grab bars are attached to wall however wall moves/flexes unsafely)  Home Equipment: Sheila Debi, Electric scooter, 4 wheeled walker, Rolling walker (uses electric scooter for community distances only, uses 4ww in home)  ADL Assistance: [address] [place] [address] Avenue: Independent  Homemaking Responsibilities: Yes  Ambulation Assistance: Independent  Transfer Assistance: Independent  Active : Yes  Occupation: Retired  Type of occupation: [address]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, 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, painting      Objective   Vision: Within Functional Limits  Vision Exceptions: Wears glasses for reading  Hearing: Within functional limits    Orientation  Overall Orientation Status: Within Functional Limits     Balance  Sitting Balance: Supervision  Standing Balance:  Moderate assistance  Standing Balance  Time: 10 seconds  Activity: Pt stood bedside breifly using RW, high pain in LLE  Functional Mobility  Functional Mobility Comments: ANGIE  ADL  Feeding: Modified independent ;Setup; Increased time to complete  Grooming: Setup; Increased time to complete;Contact guard assistance  UE Bathing: Increased time to complete;Setup; Moderate assistance  LE Bathing: Setup; Increased time to complete;Maximum assistance  UE Dressing: Increased time to complete;Setup; Moderate assistance  LE Dressing: Setup; Increased time to complete;Maximum assistance  Toileting: Setup; Increased time to complete;Maximum assistance  Additional Comments: Pt limited by LLE pain and poor standing tolerance, pt sat EOB for successful doffing/donning of R sock using sock-aid/reacher-good return from pt, pt wearing breif and external female catheter entire session  Tone RUE  RUE Tone: Normotonic  Tone LUE  LUE Tone: Normotonic  Coordination  Movements Are Fluid And Coordinated: No  Coordination and Movement description: Decreased speed     Bed mobility  Supine to Sit: Minimal assistance  Sit to Supine: Moderate assistance  Scooting: Minimal assistance  Comment: Pt supine in bed upon arrival/exit this date with alarm on at exit  Transfers  Sit to stand: 2 Person assistance; Moderate assistance  Stand to sit: Moderate assistance  Transfer Comments: High BMI, pt properly placed LLE in front on RLE so RLE would support more of weight during transfer     Cognition  Overall Cognitive Status: WFL        Sensation  Overall Sensation Status: WFL        LUE AROM (degrees)  LUE AROM : WFL  RUE AROM (degrees)  RUE AROM : WFL  LUE Strength  Gross LUE Strength: WFL  L Hand General: 4+/5  RUE Strength  Gross RUE Strength: WFL  R Hand General: 4+/5         Plan   Plan  Times per week: 3-4x           AM-PAC Score        AM-[facility] Inpatient Daily Activity Raw Score: 15 (10/27/21 1322)  AM-PAC Inpatient ADL T-Scale Score : 34.69 (10/27/21 1322)  ADL Inpatient CMS 0-100% Score: 56.46 (10/27/21 1322)  ADL Inpatient CMS G-Code Modifier : CK (10/27/21 1322)    Goals  Short term goals  Time Frame for Short term goals: Pt will by discharge  Short term goal 1: demo good safety awareness during func mob around room using RW and CGA  Short term goal 2: demo ADL UB bathing/dressing activity with mod I and increased time  Short term goal 3: demo ADL LB bathing/dressing activity with Min A, AD PRN, and increased time  Short term goal 4: demo activity tolerance for 35 min+  Short term goal 5: identify 2 non-pharmacological pain-relieving techniques with 1 vc     Therapy Time   Individual Concurrent Group Co-treatment   Time In 0746         Time Out 0811         Minutes 25         Timed Code Treatment Minutes: [facility] [facility] G[facility], OTR/L

## 2021-10-28 LAB
ABO/RH: NORMAL
ABSOLUTE EOS #: 0.23 K/UL (ref 0–0.44)
ABSOLUTE IMMATURE GRANULOCYTE: 0.14 K/UL (ref 0–0.3)
ABSOLUTE LYMPH #: 1.79 K/UL (ref 1.1–3.7)
ABSOLUTE MONO #: 0.82 K/UL (ref 0.1–1.2)
ANION GAP SERPL CALCULATED.3IONS-SCNC: 13 MMOL/L (ref 9–17)
ANTIBODY SCREEN: NEGATIVE
ARM BAND NUMBER: NORMAL
BASOPHILS # BLD: 0 % (ref 0–2)
BASOPHILS ABSOLUTE: 0.05 K/UL (ref 0–0.2)
BLD PROD TYP BPU: NORMAL
BLOOD BANK SPECIMEN: NORMAL
BUN BLDV-MCNC: 29 MG/DL (ref 8–23)
BUN/CREAT BLD: ABNORMAL (ref 9–20)
C-REACTIVE PROTEIN: 162.1 MG/L (ref 0–5)
CALCIUM SERPL-MCNC: 8.2 MG/DL (ref 8.6–10.4)
CHLORIDE BLD-SCNC: 99 MMOL/L (ref 98–107)
CO2: 20 MMOL/L (ref 20–31)
CREAT SERPL-MCNC: 1.15 MG/DL (ref 0.5–0.9)
CROSSMATCH RESULT: NORMAL
DIFFERENTIAL TYPE: ABNORMAL
DISPENSE STATUS BLOOD BANK: NORMAL
EOSINOPHILS RELATIVE PERCENT: 2 % (ref 1–4)
EXPIRATION DATE: NORMAL
GFR AFRICAN AMERICAN: 57 ML/MIN
GFR NON-AFRICAN AMERICAN: 47 ML/MIN
GFR SERPL CREATININE-BSD FRML MDRD: ABNORMAL ML/MIN/{1.73_M2}
GFR SERPL CREATININE-BSD FRML MDRD: ABNORMAL ML/MIN/{1.73_M2}
GLUCOSE BLD-MCNC: 142 MG/DL (ref 70–99)
GLUCOSE BLD-MCNC: 143 MG/DL (ref 65–105)
GLUCOSE BLD-MCNC: 150 MG/DL (ref 65–105)
GLUCOSE BLD-MCNC: 199 MG/DL (ref 65–105)
GLUCOSE BLD-MCNC: 270 MG/DL (ref 65–105)
HCT VFR BLD CALC: 23.1 % (ref 36.3–47.1)
HCT VFR BLD CALC: 23.5 % (ref 36.3–47.1)
HEMOGLOBIN: 6.8 G/DL (ref 11.9–15.1)
HEMOGLOBIN: 7.2 G/DL (ref 11.9–15.1)
IMMATURE GRANULOCYTES: 1 %
LYMPHOCYTES # BLD: 15 % (ref 24–43)
MCH RBC QN AUTO: 26.8 PG (ref 25.2–33.5)
MCHC RBC AUTO-ENTMCNC: 29.4 G/DL (ref 28.4–34.8)
MCV RBC AUTO: 90.9 FL (ref 82.6–102.9)
MONOCYTES # BLD: 7 % (ref 3–12)
NRBC AUTOMATED: 0 PER 100 WBC
PDW BLD-RTO: 14.8 % (ref 11.8–14.4)
PLATELET # BLD: 278 K/UL (ref 138–453)
PLATELET ESTIMATE: ABNORMAL
PMV BLD AUTO: 10.2 FL (ref 8.1–13.5)
POTASSIUM SERPL-SCNC: 5.1 MMOL/L (ref 3.7–5.3)
RBC # BLD: 2.54 M/UL (ref 3.95–5.11)
RBC # BLD: ABNORMAL 10*6/UL
SEG NEUTROPHILS: 74 % (ref 36–65)
SEGMENTED NEUTROPHILS ABSOLUTE COUNT: 8.7 K/UL (ref 1.5–8.1)
SODIUM BLD-SCNC: 132 MMOL/L (ref 135–144)
TRANSFUSION STATUS: NORMAL
UNIT DIVISION: 0
UNIT NUMBER: NORMAL
WBC # BLD: 11.7 K/UL (ref 3.5–11.3)
WBC # BLD: ABNORMAL 10*3/UL

## 2021-10-28 PROCEDURE — 6370000000 HC RX 637 (ALT 250 FOR IP)

## 2021-10-28 PROCEDURE — 6360000002 HC RX W HCPCS

## 2021-10-28 PROCEDURE — 86920 COMPATIBILITY TEST SPIN: CPT

## 2021-10-28 PROCEDURE — P9016 RBC LEUKOCYTES REDUCED: HCPCS

## 2021-10-28 PROCEDURE — 6360000002 HC RX W HCPCS: Performed by: INTERNAL MEDICINE

## 2021-10-28 PROCEDURE — 85014 HEMATOCRIT: CPT

## 2021-10-28 PROCEDURE — 36430 TRANSFUSION BLD/BLD COMPNT: CPT

## 2021-10-28 PROCEDURE — 82947 ASSAY GLUCOSE BLOOD QUANT: CPT

## 2021-10-28 PROCEDURE — 86850 RBC ANTIBODY SCREEN: CPT

## 2021-10-28 PROCEDURE — 2580000003 HC RX 258

## 2021-10-28 PROCEDURE — 6370000000 HC RX 637 (ALT 250 FOR IP): Performed by: NURSE PRACTITIONER

## 2021-10-28 PROCEDURE — 80048 BASIC METABOLIC PNL TOTAL CA: CPT

## 2021-10-28 PROCEDURE — 99232 SBSQ HOSP IP/OBS MODERATE 35: CPT | Performed by: INTERNAL MEDICINE

## 2021-10-28 PROCEDURE — 1200000000 HC SEMI PRIVATE

## 2021-10-28 PROCEDURE — C1751 CATH, INF, PER/CENT/MIDLINE: HCPCS

## 2021-10-28 PROCEDURE — 86901 BLOOD TYPING SEROLOGIC RH(D): CPT

## 2021-10-28 PROCEDURE — 76937 US GUIDE VASCULAR ACCESS: CPT

## 2021-10-28 PROCEDURE — 86140 C-REACTIVE PROTEIN: CPT

## 2021-10-28 PROCEDURE — 85018 HEMOGLOBIN: CPT

## 2021-10-28 PROCEDURE — 6370000000 HC RX 637 (ALT 250 FOR IP): Performed by: INTERNAL MEDICINE

## 2021-10-28 PROCEDURE — 86900 BLOOD TYPING SEROLOGIC ABO: CPT

## 2021-10-28 PROCEDURE — 36415 COLL VENOUS BLD VENIPUNCTURE: CPT

## 2021-10-28 PROCEDURE — 05HF33Z INSERTION OF INFUSION DEVICE INTO LEFT CEPHALIC VEIN, PERCUTANEOUS APPROACH: ICD-10-PCS | Performed by: INTERNAL MEDICINE

## 2021-10-28 PROCEDURE — 85025 COMPLETE CBC W/AUTO DIFF WBC: CPT

## 2021-10-28 RX ORDER — INSULIN GLARGINE 100 [IU]/ML
35 INJECTION, SOLUTION SUBCUTANEOUS NIGHTLY
Status: DISCONTINUED | OUTPATIENT
Start: 2021-10-28 | End: 2021-11-01

## 2021-10-28 RX ORDER — SODIUM CHLORIDE 9 MG/ML
INJECTION, SOLUTION INTRAVENOUS PRN
Status: DISCONTINUED | OUTPATIENT
Start: 2021-10-28 | End: 2021-10-29

## 2021-10-28 RX ORDER — OXYCODONE HYDROCHLORIDE AND ACETAMINOPHEN 5; 325 MG/1; MG/1
2 TABLET ORAL EVERY 4 HOURS PRN
Status: DISCONTINUED | OUTPATIENT
Start: 2021-10-28 | End: 2021-11-04 | Stop reason: HOSPADM

## 2021-10-28 RX ORDER — POLYETHYLENE GLYCOL 3350 17 G/17G
17 POWDER, FOR SOLUTION ORAL DAILY
Status: DISCONTINUED | OUTPATIENT
Start: 2021-10-28 | End: 2021-11-04 | Stop reason: HOSPADM

## 2021-10-28 RX ADMIN — ENOXAPARIN SODIUM 30 MG: 30 INJECTION SUBCUTANEOUS at 21:07

## 2021-10-28 RX ADMIN — CARVEDILOL 3.12 MG: 3.12 TABLET, FILM COATED ORAL at 16:53

## 2021-10-28 RX ADMIN — CEFTRIAXONE SODIUM 2000 MG: 2 INJECTION, POWDER, FOR SOLUTION INTRAMUSCULAR; INTRAVENOUS at 10:10

## 2021-10-28 RX ADMIN — INSULIN LISPRO 2 UNITS: 100 INJECTION, SOLUTION INTRAVENOUS; SUBCUTANEOUS at 08:08

## 2021-10-28 RX ADMIN — OXYCODONE HYDROCHLORIDE AND ACETAMINOPHEN 2 TABLET: 5; 325 TABLET ORAL at 04:55

## 2021-10-28 RX ADMIN — LOSARTAN POTASSIUM 100 MG: 50 TABLET, FILM COATED ORAL at 08:06

## 2021-10-28 RX ADMIN — SODIUM CHLORIDE, PRESERVATIVE FREE 10 ML: 5 INJECTION INTRAVENOUS at 08:07

## 2021-10-28 RX ADMIN — INSULIN LISPRO 6 UNITS: 100 INJECTION, SOLUTION INTRAVENOUS; SUBCUTANEOUS at 12:26

## 2021-10-28 RX ADMIN — OXYCODONE HYDROCHLORIDE AND ACETAMINOPHEN 2 TABLET: 5; 325 TABLET ORAL at 09:20

## 2021-10-28 RX ADMIN — OXYCODONE HYDROCHLORIDE AND ACETAMINOPHEN 2 TABLET: 5; 325 TABLET ORAL at 18:37

## 2021-10-28 RX ADMIN — OXYCODONE HYDROCHLORIDE AND ACETAMINOPHEN 1 TABLET: 5; 325 TABLET ORAL at 00:52

## 2021-10-28 RX ADMIN — INSULIN LISPRO 2 UNITS: 100 INJECTION, SOLUTION INTRAVENOUS; SUBCUTANEOUS at 16:53

## 2021-10-28 RX ADMIN — INSULIN LISPRO 1 UNITS: 100 INJECTION, SOLUTION INTRAVENOUS; SUBCUTANEOUS at 21:08

## 2021-10-28 RX ADMIN — OXYCODONE HYDROCHLORIDE AND ACETAMINOPHEN 1 TABLET: 5; 325 TABLET ORAL at 00:18

## 2021-10-28 RX ADMIN — OXYCODONE HYDROCHLORIDE AND ACETAMINOPHEN 2 TABLET: 5; 325 TABLET ORAL at 14:32

## 2021-10-28 RX ADMIN — CARVEDILOL 3.12 MG: 3.12 TABLET, FILM COATED ORAL at 08:06

## 2021-10-28 RX ADMIN — PANTOPRAZOLE SODIUM 40 MG: 40 TABLET, DELAYED RELEASE ORAL at 08:06

## 2021-10-28 RX ADMIN — POLYETHYLENE GLYCOL 3350 17 G: 17 POWDER, FOR SOLUTION ORAL at 12:26

## 2021-10-28 RX ADMIN — AMLODIPINE BESYLATE 10 MG: 10 TABLET ORAL at 08:06

## 2021-10-28 RX ADMIN — SODIUM CHLORIDE, PRESERVATIVE FREE 10 ML: 5 INJECTION INTRAVENOUS at 21:12

## 2021-10-28 RX ADMIN — INSULIN GLARGINE 35 UNITS: 100 INJECTION, SOLUTION SUBCUTANEOUS at 21:08

## 2021-10-28 RX ADMIN — ENOXAPARIN SODIUM 30 MG: 30 INJECTION SUBCUTANEOUS at 08:06

## 2021-10-28 ASSESSMENT — PAIN SCALES - GENERAL
PAINLEVEL_OUTOF10: 9
PAINLEVEL_OUTOF10: 10
PAINLEVEL_OUTOF10: 10
PAINLEVEL_OUTOF10: 2
PAINLEVEL_OUTOF10: 5
PAINLEVEL_OUTOF10: 5
PAINLEVEL_OUTOF10: 2
PAINLEVEL_OUTOF10: 7
PAINLEVEL_OUTOF10: 6

## 2021-10-28 NOTE — PLAN OF CARE
Problem: Pain:  Goal: Pain level will decrease  Description: Pain level will decrease  Outcome: Ongoing  Goal: Control of acute pain  Description: Control of acute pain  Outcome: Ongoing  Goal: Control of chronic pain  Description: Control of chronic pain  Outcome: Ongoing     Problem: Falls - Risk of:  Goal: Will remain free from falls  Description: Will remain free from falls  Outcome: Ongoing  Goal: Absence of physical injury  Description: Absence of physical injury  Outcome: Ongoing     Problem: Skin Integrity:  Goal: Will show no infection signs and symptoms  Description: Will show no infection signs and symptoms  Outcome: Ongoing  Goal: Absence of new skin breakdown  Description: Absence of new skin breakdown  Outcome: Ongoing     Problem: Musculor/Skeletal Functional Status  Goal: Highest potential functional level  Outcome: Ongoing

## 2021-10-28 NOTE — PROGRESS NOTES
Orthopedic Progress Note    Patient:  Aaron Peralta  YOB: 1950     80 y.o. female    Subjective:  Patient seen and examined. No complaints, concerns or issues overnight. Pain controlled. Denies fever, CP, SOB    Vitals reviewed, afebrile    Objective:   Vitals:    10/27/21 1945   BP: 139/61   Pulse: 86   Resp: 16   Temp: 98.2 °F (36.8 °C)   SpO2:      General: NAD, cooperative   Cardiovascular: Regular rate   Respiratory: Chest symmetric, no accessory muscle use, normal respirations, no audible wheezes  Musculoskeletal  Left lower extremity: Prevena on, maintaining suction. Minimal output in cannister. EHL/FHL/TA/GS complex motor intact. Sural, saphenous, superificial/deep peroneal, and plantar nerve distribution SILT. Dorsalis pedis pulse 2+ with BCR.       Recent Labs     10/27/21  0544   WBC 11.7*   HGB 7.5*   HCT 25.4*      *   K 5.5*   BUN 25*   CREATININE 0.98*   GLUCOSE 264*      Meds:  Ceftriaxone, ancef  See rec for complete list    Impression 80 y.o. female with left knee PJI s/p I&D with fusion nail on 10/26, POD2    Plan  - Awaiting final ID recs for outpatient antibiotics  - Continue abx per ID  - 50% partial weight bearing on LLE; do not attempt to bend through left knee  - Maintain prevena suction; ok to DC with prevena on.  - Hgb 7.5 yesterday  - OK for diet/DVT ppx  - Ice for 20 minutes an hour and keep elevated to reduce swelling and throbbing pain  - Encourage IS use and PT/OT participation  - Please page ortho with any questions    Electronically signed by Jayda Davidson MD at 5:11 AM 10/28/21

## 2021-10-28 NOTE — PROGRESS NOTES
Physician Progress Note      [facility]                  [id number]  :                       1950  ADMIT DATE:       10/21/2021 10:51 PM  DISCH DATE:  RESPONDING  PROVIDER #:        Perla Titus          QUERY TEXT:    Patient admitted with left knee prosthetic joint infection. Per Op note dated   10/26 documentation of sharp  debridement of subcutaneous tissue. muscle   fascia and bone. To accurately reflect the procedure performed please document   if debridement was excisional or nonexcisional :    The medical record reflects the following:  Risk Factors: prosthetic joint infection  Clinical Indicators: per operative report on 10/26 Debridement of the skin,   subcutaneous tissue, muscle, fascia, bone was done sharply with a ronguer,   scalpel, electrocautery and curette. Treatment: monitoring, orthopedic consult, surgery , wound care    thank you Call if questions [facility]                                                                                                                                                                                                                                                                                                                                 [phone]  Options provided:  -- Nonexcisional debridement of subcutaneous tissue muscle fascia and bone  -- Excisional debridement of subcutaneous tissue, muscle ,fascia and bone  -- Other - I will add my own diagnosis  -- Disagree - Not applicable / Not valid  -- Disagree - Clinically unable to determine / Unknown  -- Refer to Clinical Documentation Reviewer    PROVIDER RESPONSE TEXT:    Excisional debridement of subcutaneous tissue , muscle, fascia and bone of   left knee was performed during procedure on 10/26    Query created by:  Lydia Neely on 10/27/2021 11:42 AM      Electronically signed by:  Perla Titus 10/28/2021 5:11 AM

## 2021-10-28 NOTE — CARE COORDINATION
[address] [place] [address] Road [phone] and spoke to Lytle. CM updated that patient has a wound vac. A midline was just placed and she will need IV antibiotics. Le relates referral was sent to his DON. He will update CM when he knows more. He requested updated notes be faxed to him at [phone]. ID note (10/27), Ortho progress note (10/28), and PT/OT notes from 10/27 faxed. [address]  from [employer] called. He wants specifics of antibiotics. From ID note \"Continue Ceftriaxone 2 gm IV q 24 hr for left knee periprosthetic infection for 4 weeks (Start Date: 10/22/21. Stop date 11-20-21)\". Also wants to know specifics of wound vac on patient . [address] to patient's Rn about wound vac. She relates that patient has a [employer]a wound vac. It stays on for 7-10 days and will go with the patient when she is discharged. [address] [place] [address] to  Lytle and told him about wound vac. He asked if some supplies can come with patient. Patient needs a covid test within 3 days of admission of SNF. He will submit for pre cert as soon as he gets needed clinicals.  PT/OT, progress notes faxed to [phone]

## 2021-10-28 NOTE — PLAN OF CARE
Nutrition Problem #1: Inadequate oral intake  Intervention: Food and/or Nutrient Delivery: Continue Current Diet, Start Oral Nutrition Supplement  Nutritional Goals: PO intake to meet > 50% of estimated nutrition needs

## 2021-10-28 NOTE — PROGRESS NOTES
Comprehensive Nutrition Assessment    Type and Reason for Visit:  Initial, RD Nutrition Re-Screen/LOS    Nutrition Recommendations/Plan:   - Continue Regular Diet with 4 carbohydrate choices and 1500 mL fluid restriction  - Start diabetic oral nutrition supplements (Glucerna)  - Monitor/encourage PO intakes     Nutrition Assessment:  Patient seen for length of stay. Reports her appetite is okay. States PO intake depends on type of meal served. Wanting Glucerna ONS with meals. Labs reviewed include hyponatremia. Blood glucose range x past 24 hours = 142-270 mg/dL. Last BM noted on 10/26. Reports a usual body weight of 265-285 lbs. Malnutrition Assessment:  Malnutrition Status: At risk for malnutrition     Context:  Acute Illness     Findings of the 6 clinical characteristics of malnutrition:  Energy Intake:  Mild decrease in energy intake   Weight Loss:  No significant weight loss     Body Fat Loss:  No significant body fat loss     Muscle Mass Loss:  No significant muscle mass loss    Fluid Accumulation:  1 - Mild- Generalized, Extremities   Strength:  Not Performed    Estimated Daily Nutrient Needs:  Energy (kcal):  6451-7194 kcal/d (12-14 kcal/kg); Weight Used for Energy Requirements:  Current (137.9 kg)     Protein (g):  110 g protein/d (2 g/kg); Weight Used for Protein Requirements:  Ideal (55 kg)        Fluid (ml/day):  1500 mL fluid restriction per MD    Nutrition Related Findings:  Labs: Na 132 (L). Meds: Humalog, Glycolax. Last BM 10/26. +1/+2 edema. Wounds:  Surgical Incision, Wound Vac       Current Nutrition Therapies:    ADULT DIET;  Regular; 4 carb choices (60 gm/meal); 1500 ml  ADULT ORAL NUTRITION SUPPLEMENT; Breakfast, Lunch, Dinner; Diabetic Oral Supplement    Anthropometric Measures:  · Height: 5' 4\" (162.6 cm)  · Current Body Weight: 304 lb (137.9 kg)   · Admission Body Weight: 293 lb 8 oz (133.1 kg)    · Usual Body Weight:  265-285 lbs per Patient     · Ideal Body Weight: 120 lbs; % Ideal Body Weight 253.3 %   · BMI: 52.2  · BMI Categories: Obese Class 3 (BMI 40.0 or greater)       Nutrition Diagnosis:   · Inadequate oral intake related to decreased appetite / current medical condition as evidenced by variable PO intake since admit    Nutrition Interventions:   Food and/or Nutrient Delivery:  Continue Current Diet, Start Oral Nutrition Supplement  Nutrition Education/Counseling:  No recommendation at this time   Coordination of Nutrition Care:  Continue to monitor while inpatient    Goals:  PO intake to meet > 50% of estimated nutrition needs       Nutrition Monitoring and Evaluation:   Behavioral-Environmental Outcomes:  None Identified   Food/Nutrient Intake Outcomes:  Food and Nutrient Intake, Supplement Intake  Physical Signs/Symptoms Outcomes:  Biochemical Data, GI Status, Fluid Status or Edema, Nutrition Focused Physical Findings, Skin, Weight     Discharge Planning:     Too soon to determine     Electronically signed by Rachel Mas RD, LD on 10/28/21 at 3:04 PM EDT    Contact: [phone]

## 2021-10-28 NOTE — PROGRESS NOTES
[facility]  Office: [address], DO, Tahirlizeth Bang, DO, Krissy Adler, DO, Юлия Lyons, DO, Romelia Alvarez MD, Ehsan Brewer MD, Johny Zabala MD, Shefali Last MD, Michele Calvin MD, Celia Kumar MD, Sai Chisholm MD, Grey Duckworth MD, Millstone Officer, DO, Jake Davis, DO, Mike Robin MD,  March , DO, Kyle Montelongo MD, Onel Martinez MD, Jg Yen MD, Amy Alfred MD, Quiana Henry MD, Leisa Vazquez MD, Carmen Wolf, [facility], [facility], CNP, Mimi Caal, CNP, Judah Olvera, CNS, Matilda Pa, [facility], Juventino Denise, CNP, Dorothy Whittington, CNP, Michael Yan, CNP, Gearline Precise, CNP, Sonya Dodge, CNP, Dung Hewitt PA-C, Ector Bowman, [facility], Hipolito Roberts, CNP, Samira Stephen, CNP, Carrillo Dong, CNP, Lilliam Jacob, CNP, Jennie Patiño, CNP, Robin Harvey, CNP, [facility], [address] [place] [address]    Progress Note    10/28/2021    11:58 AM    Name:   Farideh Montoya  MRN:     [id number]     Acct:      [de-identified]   Room:   [address] [place], [place] [address]-[facility] Day:  7  Admit Date:  10/21/2021 10:51 PM    PCP:   Zoraida Lujan  Code Status:  Full Code    Subjective:     C/C: arthritis  Interval History Status: not changed. Patient seen and examined bedside. No complaints. Denies any chest pain, shortness of breath or difficulty breathing. She is feeling stronger than yesterday. She is complaining of constipation     Brief History:     80 y.o. female being seen for a left knee periprosthetic joint infection. Patient overall feeling well aside from pain in left knee. Plan for OR today for explantation and antibiotic spacer.     Review of Systems:     Constitutional:  negative for chills, fevers, sweats  Respiratory:  negative for cough, dyspnea on exertion, shortness of breath, wheezing  Cardiovascular:  negative for chest pain, chest pressure/discomfort, lower extremity edema, palpitations  Gastrointestinal:  negative for abdominal pain, constipation, diarrhea, nausea, vomiting  Neurological:  negative for dizziness, headache has some light headedness. EXT: knee pain is improving, admits to decreased range of motion but doing well. Medications: Allergies: Allergies   Allergen Reactions    Bactrim [Sulfamethoxazole-Trimethoprim]     Codeine     Lisinopril Other (See Comments)     cough       Current Meds:   Scheduled Meds:    polyethylene glycol  17 g Oral Daily    magnesium citrate  296 mL Oral Once    enoxaparin  30 mg SubCUTAneous BID    amLODIPine  10 mg Oral Daily    carvedilol  3.125 mg Oral BID WC    sodium chloride flush  5-40 mL IntraVENous 2 times per day    sodium chloride flush  5-40 mL IntraVENous 2 times per day    insulin glargine  30 Units SubCUTAneous Nightly    losartan  100 mg Oral Daily    pantoprazole  40 mg Oral QAM AC    insulin lispro  0-12 Units SubCUTAneous TID WC    cefTRIAXone (ROCEPHIN) IV  2,000 mg IntraVENous Q24H    insulin lispro  0-6 Units SubCUTAneous Nightly     Continuous Infusions:    sodium chloride      sodium chloride      sodium chloride      dextrose      sodium chloride       PRN Meds: oxyCODONE-acetaminophen, sodium chloride, fentanNYL, sodium chloride flush, sodium chloride flush, sodium chloride flush, sodium chloride, sodium chloride flush, sodium chloride, acetaminophen, glucose, dextrose, glucagon (rDNA), dextrose, sodium chloride flush, sodium chloride, ondansetron **OR** ondansetron, acetaminophen **OR** acetaminophen    Data:     Past Medical History:   has a past medical history of Arthritis, Diabetes mellitus ([address] Hyperlipidemia, and Hypertension. Social History:   reports that she has never smoked. She does not have any smokeless tobacco history on file. She reports that she does not drink alcohol and does not use drugs. Family History: No family history on file.     Vitals:  /67   Pulse 78   Temp 98 °F (36.7 °C) (Oral) Resp 16   Ht 5' 4\" (1.626 m)   Wt (!) 304 lb (137.9 kg)   SpO2 98%   BMI 52.18 kg/m²   Temp (24hrs), Av.2 °F (36.8 °C), Min:98 °F (36.7 °C), Max:98.3 °F (36.8 °C)    Recent Labs     10/27/21  1549 10/27/21  2114 10/28/21  0627 10/28/21  1107   POCGLU 206* 166* 143* 270*       I/O (24Hr): Intake/Output Summary (Last 24 hours) at 10/28/2021 1158  Last data filed at 10/28/2021 0700  Gross per 24 hour   Intake --   Output 1600 ml   Net -1600 ml       Labs:  Hematology:  Recent Labs     10/26/21  0443 10/27/21  0544 10/28/21  0636   WBC 7.8 11.7* 11.7*   RBC 3.15* 2.81* 2.54*   HGB 8.5* 7.5* 6.8*   HCT 29.1* 25.4* 23.1*   MCV 92.4 90.4 90.9   MCH 27.0 26.7 26.8   MCHC 29.2 29.5 29.4   RDW 14.7* 14.6* 14.8*    269 278   MPV 9.8 10.2 10.2   .8*  --  162.1*     Chemistry:  Recent Labs     10/26/21  0443 10/27/21  0544 10/28/21  0636    130* 132*   K 4.8 5.5* 5.1    102 99   CO2 21 17* 20   GLUCOSE 114* 264* 142*   BUN 22 25* 29*   CREATININE 0.78 0.98* 1.15*   ANIONGAP 10 11 13   LABGLOM >60 56* 47*   GFRAA >60 >60 57*   CALCIUM 8.4* 7.6* 8.2*     Recent Labs     10/27/21  0818 10/27/21  1134 10/27/21  1549 10/27/21  2114 10/28/21  0627 10/28/21  1107   POCGLU 263* 237* 206* 166* 143* 270*     ABG:No results found for: POCPH, PHART, PH, POCPCO2, CKO3SFW, PCO2, POCPO2, PO2ART, PO2, POCHCO3, MTO1SBX, HCO3, NBEA, PBEA, BEART, BE, THGBART, THB, WFE5TJB, YVWD9ARP, A6NOFRPO, O2SAT, FIO2  Lab Results   Component Value Date/Time    SPECIAL RT 4ML 10/21/2021 11:00 PM     Lab Results   Component Value Date/Time    CULTURE NO GROWTH 6 DAYS 10/21/2021 11:00 PM       Radiology:  XR FEMUR LEFT (MIN 2 VIEWS)    Result Date: 10/22/2021  1. No acute osseous abnormality in the left femur or tibia/fibula. 2.  Left knee arthroplasty in place without evidence for hardware loosening. 3.  Leg length measurements provided above. Please note limitations and landmarks used.      XR KNEE LEFT (3 VIEWS)    Result Date: 10/22/2021  1. Soft tissue edema and effusion. 2. No acute osseous abnormality. XR TIBIA FIBULA LEFT (2 VIEWS)    Result Date: 10/22/2021  1. No acute osseous abnormality in the left femur or tibia/fibula. 2.  Left knee arthroplasty in place without evidence for hardware loosening. 3.  Leg length measurements provided above. Please note limitations and landmarks used. XR CHEST PORTABLE    Result Date: 10/22/2021  1. Aberrant positioning of the right upper extremity PICC line. Tip is either within the distal left subclavian vein, or directed into the azygous vein. Repositioning is recommended 2. Report was marked to be called to a licensed caregiver     NM Cardiac Stress Test Nuclear Imaging    Result Date: 10/25/2021  Perfusion:  Stress-induced reversible defects as above involving 12% of the left ventricular myocardium at stress. Function:  Normal left ventricular ejection fraction of 55%. Mild perfusion defect in the apex and mid inferior wall. Risk stratification: HIGH DUE TO PERFUSION DEFECTS. Notes concerning risk stratification: Risk stratification incorporates both clinical history and some testing results. Final risk determination is the responsibility of the ordering provider as other patient information and test results may increase or decrease the risk assessment reported for this examination. Risk stratification criteria are adapted from \"Noninvasive Risk Stratification\" criteria from [employer]. Al, ACC/AATS/AHA/ASE/ASNC/SCAI/SCCT/STS 2017 Appropriate Use Criteria For Coronary Revascularization in Patients With Stable Ischemic Heart Disease [facility] Volume 69, Issue 17, May 2017 High risk (>3% annual death or MI) 1. Severe resting LV dysfunction (LVEF <35%) not readily explained by non coronary causes 2. Resting perfusion abnormalities greater than 10% of the myocardium in patients without prior history or evidence of MI3.  Stress-induced perfusion abnormalities encumbering greater than or equal to 10% myocardium or stress segmental scores indicating multiple vascular territories with abnormalities 4. Stress-induced LV dilatation (TID ratio greater than 1.19 for exercise and greater than 1.39 for regadenoson) Intermediate risk (1% to 3% annual death or MI) 1. Mild/moderate resting LV dysfunction (LVEF 35% to 49%) not readily explained by non coronary causes. 2. Resting perfusion abnormalities in 5%-9.9% of the myocardium in patients without a history or prior evidence of MI 3. Stress-induced perfusion abnormality encumbering 5%-9.9% of the myocardium or stress segmental scores indicating 1 vascular territory with abnormalities but without LV dilation 4. Small wall motion abnormality involving 1-2 segments and only 1 coronary bed. Low Risk (Less than 1% annual death or MI) 1. Normal or small myocardial perfusion defect at rest or with stress encumbering less than 5% of the myocardium. Physical Examination:        General appearance:  alert, cooperative and no distress  Mental Status:  oriented to person, place and time and normal affect  Lungs:  clear to auscultation bilaterally, normal effort  Heart:  regular rate and rhythm, no murmur  Abdomen:  soft, nontender, nondistended, normal bowel sounds, no masses, hepatomegaly, splenomegaly  Extremities:  no edema, redness, tenderness in the calves  Skin:  no gross lesions, rashes, induration. Knee incision c/d/i, drain in place.      Assessment:        Hospital Problems         Last Modified POA    * (Principal) Staphylococcal arthritis of left knee ([address] 10/22/2021 Yes    Type 2 diabetes mellitus without complication, without long-term current use of insulin ([address] 10/22/2021 Yes    PITA on CPAP 10/22/2021 Yes    CAD (coronary artery disease) 10/22/2021 Yes    COPD (chronic obstructive pulmonary disease) ([address] 10/22/2021 Yes    Primary hypertension 10/22/2021 Yes    Acute kidney injury superimposed on CKD ([address] 10/22/2021 Yes    Normocytic normochromic anemia 10/22/2021 Yes          Plan:        Left knee periprosthetic joint infection: S/p knee explant, Irrigation/debridement, with placement of static antibiotic spacer and ortho fusion nail on 10/27. Continue Rocephin 2 gm IV every 24 hours  Blood cultures NGDT x1. Post operative anemia: Hgb 6.8 this morning. Given DHRUV, drop in Hgb will order one unit. Monitor RBC. Transfuse prn per protocol. Constipation: Start bowel regimen   Acute Kidney injury on Chronic Kidney disease stage 1: Resolved. Scr back to baseline  Non anion gap metabolic acidosis: stop normal saline  Mild Hyperkalemia: Monitor potassium. Hyponatremia: 2/2 osmotic drag from hyperglycemia with component of SIADH due to pain/sepsis. Urine sodium 63, urine osm 403  Normocytic normochromic anemia: Blood loss anemia+/-functional NOY and anemia of inflammation. Tsat 9%. Iron therapy once acute infection resolves. Monitor Hgb, transfuse prn for Hgb <7. Diabetes mellitus type II with Hyperglycemia: Increase Lantus 35 units QHS, ISS  Morbid Obesity BMI of 51: recommend weight loss and lifestyle modification  Uncontrolled Hypertension-improving: continue home antihypertensive regimen. Continue Coreg 3.125, Norvasc, hold Losartan given increasing creatinine .    Suspected PITA: needs outpt sleep study  DVT ppx  PT/OT    Alex Key DO  10/28/2021  11:58 AM

## 2021-10-28 NOTE — PROGRESS NOTES
Physical Therapy        Physical Therapy Cancel Note      DATE: 10/28/2021    NAME: Cl Mcdonald  MRN: [id number]   : 1950      Patient not seen this date for Physical Therapy due to:     Other: Hgb 6.8, awaiting possible blood transfusion      Electronically signed by Kapil Webster PTA on 10/28/2021 at 10:33 AM

## 2021-10-28 NOTE — PROGRESS NOTES
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 Stop date 11-20-21)  Long term oral antibiotic suppression following the above  Will need midline for IV antibiotics  Orthopedic surgery following-  S/p  explantation and antibiotic spacer device placement 10-26-21. Office f/up in 3 weeks with Dr Deena Morgan for infection. Please call [phone] for appointment     Medical Decision Making/Summary/Discussion:10/28/2021       Infection Control Recommendations   [place] Precautions      Antimicrobial Stewardship Recommendations     Simplification of therapy    Coordination of Outpatient Care:   Estimated Length of IV antimicrobials:4 weeks  Patient will need Midline Catheter Insertion: Yes  Patient will need PICC line Insertion:No  Patient will need: Home IV , [place],  SNF,  LTAC: TBD  Patient will need outpatient wound care:Yes    Chief complaint/reason for consultation:   Lt knee pyogenic arthritis      History of Present Illness:   Shazia Salas is a 80y.o.-year-old female who was initially admitted on 10/21/2021. Patient seen at the request of Dr. Joanna Dao. INITIAL HISTORY:    Patient with a Hx of prior Lt TKA  In 2007 at [facility] under Dr Óscar Ortiz, She subsequently experienced  Lt knee infections which required additional surgeries x 2. She has underlying DM 2, Essential HTN, CKD,PITA and COPD. Presented to [facility] because of acute onset of pain with ambulation. The knee joint was aspirated and grew Strep. Agalactiae.  She was started on antibiotics (ceftriaxone) and was awaiting evaluation at tertiary care center because of her Hx of prior surgeries and infections. [facility] refused transfer. She presented to [facility] because of ongoing severe pain, erythema and inflammation. Ortho has evaluated and plan intervention on 10-25-21. CURRENT EVALUATION : 10/28/2021    Afebrile  VS stable    Had pre-op evaluation by Cardiology including stress test.  Patient had positive stress test.   S/p left heart cath 10/26: normal coronary arteries, preserved LV systolic function    Orthopedic Surgery performed  Left knee I&D with fusion nail on 10/26/21 explant/spacer placement    Patient has been doing well post-op. Ordered 1 unit pRBC today. Hgb 6.8     Denies any fever, chills, nausea, vomiting. She will need IV ceftriaxone x 4 weeks and then long term oral antibiotic suppression. Labs, X rays reviewed: 10/28/2021    BUN: 40-->22-->25-->29  Cr: 1.57-->0.78-->0.98-->1.15    WBC: 10.4-->7.8-->11.7  Hb:8.9-->8.5-->7.5-->6.8   Plat: 207-->278-->269-->278    Cultures:  Urine:    Blood:  10/21/21: no growth  Sputum :    Wound:  Knee aspirate: Strep agalactiae      COVID rapid 10/22/21: negative    Discussed with patient, RN, IM. I have personally reviewed the past medical history, past surgical history, medications, social history, and family history, and I have updated the database accordingly.   Past Medical History:     Past Medical History:   Diagnosis Date    Arthritis     Diabetes mellitus ([address]     Hyperlipidemia     Hypertension        Past Surgical  History:     Past Surgical History:   Procedure Laterality Date    HYSTERECTOMY      KNEE SURGERY Left 10/26/2021    KNEE EXPLANT, IRRIGATION & DEBRIDEMENT, STATIC ANTIBIOTIC SPACER, LINK ORTHO FUSION NAIL SET    REVISION TOTAL KNEE ARTHROPLASTY Left 10/26/2021    KNEE EXPLANT, IRRIGATION & DEBRIDEMENT, STATIC ANTIBIOTIC SPACER, LINK ORTHO FUSION NAIL SET- performed by Ángel Vergara DO at [facility] OR       Medications:      polyethylene glycol  17 g Oral Daily    magnesium citrate  296 mL Oral Once    enoxaparin  30 mg SubCUTAneous BID    amLODIPine  10 mg Oral Daily    carvedilol  3.125 mg Oral BID WC    sodium chloride flush  5-40 mL IntraVENous 2 times per day    sodium chloride flush  5-40 mL IntraVENous 2 times per day    insulin glargine  30 Units SubCUTAneous Nightly    losartan  100 mg Oral Daily    pantoprazole  40 mg Oral QAM AC    insulin lispro  0-12 Units SubCUTAneous TID WC    cefTRIAXone (ROCEPHIN) IV  2,000 mg IntraVENous Q24H    insulin lispro  0-6 Units SubCUTAneous Nightly       Social History:     Social History     Socioeconomic History    Marital status:      Spouse name: Not on file    Number of children: Not on file    Years of education: Not on file    Highest education level: Not on file   Occupational History    Not on file   Tobacco Use    Smoking status: Never Smoker   Substance and Sexual Activity    Alcohol use: No    Drug use: No    Sexual activity: Not on file   Other Topics Concern    Not on file   Social History Narrative    Not on file     Social Determinants of Health     Financial Resource Strain:     Difficulty of Paying Living Expenses:    Food Insecurity:     Worried About Running Out of Food in the Last Year:     [address] in the Last Year:    Transportation Needs:     Lack of Transportation (Medical):      Lack of Transportation (Non-Medical):    Physical Activity:     Days of Exercise per Week:     Minutes of Exercise per Session:    Stress:     Feeling of Stress :    Social Connections:     Frequency of Communication with Friends and Family:     Frequency of Social Gatherings with Friends and Family:     Attends Jehovah's witness Services:     Active Member of Clubs or Organizations:     Attends Club or Organization Meetings:     Marital Status:    Intimate Partner Violence:     Fear of Current or Ex-Partner:     Emotionally Abused:     Physically Abused:     Sexually Abused:        Family History:   No family history on file. Allergies:   Bactrim [sulfamethoxazole-trimethoprim], Codeine, and Lisinopril     Review of Systems:   Constitutional: No fevers or chills. No systemic complaints  Head: No headaches  Eyes: No double vision or blurry vision. No conjunctival inflammation. ENT: No sore throat or runny nose. . No hearing loss, tinnitus or vertigo. Cardiovascular: No chest pain or palpitations. No shortness of breath. No FRANCES  Lung: No shortness of breath or cough. No sputum production  Abdomen: No nausea, vomiting, diarrhea, or abdominal pain. Garwood Copping No cramps. Genitourinary: No increased urinary frequency, or dysuria. No hematuria. No suprapubic or CVA pain  Musculoskeletal: No muscle aches or pains. No joint effusions, swelling or deformities. Lt knee effusion  Hematologic: No bleeding or bruising. Neurologic: No headache, weakness, numbness, or tingling. Integument: No rash, no ulcers. Lt knee erythema  Psychiatric: No depression. Endocrine: No polyuria, no polydipsia, no polyphagia. Physical Examination :     Patient Vitals for the past 8 hrs:   BP Temp Temp src Pulse SpO2   10/28/21 0624 129/67 98 °F (36.7 °C) Oral 78 98 %     General Appearance: Awake, alert, and in no apparent distress  Head:  Normocephalic, no trauma  Eyes: Pupils equal, round, reactive to light and accommodation; extraocular movements intact; sclera anicteric; conjunctivae pink. No embolic phenomena. ENT: Oropharynx clear, without erythema, exudate, or thrush. No tenderness of sinuses. Mouth/throat: mucosa pink and moist. No lesions. Dentition in good repair. Neck:Supple, without lymphadenopathy. Thyroid normal, No bruits. Pulmonary/Chest: Clear to auscultation, without wheezes, rales, or rhonchi. No dullness to percussion. Cardiovascular: Regular rate and rhythm without murmurs, rubs, or gallops. Abdomen: Soft, non tender.  Bowel sounds normal. No organomegaly  All four Extremities: No cyanosis, clubbing, edema. Lt knee effusion and erythema. Neurologic: No gross sensory or motor deficits. Skin: Warm and dry with good turgor. No signs of peripheral arterial or venous insufficiency. No ulcerations. No open wounds. Medical Decision Making -Laboratory:   I have independently reviewed/ordered the following labs:    CBC with Differential:   Recent Labs     10/27/21  0544 10/28/21  0636   WBC 11.7* 11.7*   HGB 7.5* 6.8*   HCT 25.4* 23.1*    278   LYMPHOPCT 7* 15*   MONOPCT 4 7     BMP:   Recent Labs     10/27/21  0544 10/28/21  0636   * 132*   K 5.5* 5.1    99   CO2 17* 20   BUN 25* 29*   CREATININE 0.98* 1.15*     Hepatic Function Panel: No results for input(s): PROT, LABALBU, BILIDIR, IBILI, BILITOT, ALKPHOS, ALT, AST in the last 72 hours. No results for input(s): RPR in the last 72 hours. No results for input(s): HIV in the last 72 hours. No results for input(s): BC in the last 72 hours. Lab Results   Component Value Date    MUCUS NOT REPORTED 10/22/2021    RBC 2.54 10/28/2021    TRICHOMONAS NOT REPORTED 10/22/2021    WBC 11.7 10/28/2021    YEAST NOT REPORTED 10/22/2021    TURBIDITY Clear 10/22/2021     Lab Results   Component Value Date    CREATININE 1.15 10/28/2021    GLUCOSE 142 10/28/2021       Medical Decision Making-Imaging:     EXAMINATION:   2 X-RAY VIEWS OF THE LEFT TIBIA AND FIBULA; 2 X-RAY VIEWS OF THE LEFT FEMUR. RULER WAS USED.       10/22/2021 9:46 am       COMPARISON:   Left knee radiographs dated 10/21/2021       HISTORY:   ORDERING SYSTEM PROVIDED HISTORY: leg length eval   TECHNOLOGIST PROVIDED HISTORY:   leg length eval   Reason for Exam: leg length eval; Best possible images at this time due to pt   condition . -JEK/GW       FINDINGS:   Left femur: Left femoral head is not visualized due to overlying soft tissue   attenuation.  The superior tip of the greater trochanter is used as a   landmark.  Femoral length from the superior tip of the greater trochanter to   the medial femoral condyle is approximately 39.2 cm.       Left total knee arthroplasty is in place.  No acute fracture of the left   femur.  No evidence of hardware loosening.       Left leg: Distance from the medial femoral condyle to the mid tibial plafond   is 36 cm, which includes the polyethylene liner in the knee arthroplasty. (Total leg length of approximately 75.2 cm from superior tip of greater   trochanter to mid tibial plafond).  The liner measures up to 2 cm in   thickness.       No acute fracture involving the tibia or fibula.  No periprosthetic lucency   in the proximal tibia.  Ankle mortise alignment is preserved.  Scattered   dystrophic calcifications in the leg.           Impression   1.  No acute osseous abnormality in the left femur or tibia/fibula.       2.  Left knee arthroplasty in place without evidence for hardware loosening.       3.  Leg length measurements provided above.  Please note limitations and   landmarks used.         CT extremity lower left with contrast    Result Date: 10/18/2021  History: Acute left lower leg and ankle redness Exam/Technique: Thin axial images through the left lower extremity were obtained from the superior aspect of the acetabulum to the left foot following the intravenous demonstration of 100 mL Omnipaque 300. Study was supplemented by sagittal and coronal reconstructed images. Comparison: None Findings: There is a left knee arthroplasty in place. Full evaluation of the area of the knee is compromised by extensive metallic artifact. There is a joint effusion of the left knee with fluid seen in the supra patellar bursa. There is no evidence for an acute osseous abnormality. No lytic or blastic processes are seen. No evidence of osteolysis demonstrated. No soft tissue masses or fluid collections are identified. IMPRESSION: 1. Left knee joint effusion with a left knee arthroplasty in place. 2. Otherwise normal CT of the left lower extremity. Workstation:[id number] Finalized by Suha Ochoa MD on 10/18/2021     Medical Decision Rdmyid-Levxunnh-Nzjta:   Microbiology Results   Procedure Component Value Units Date/Time   Body fluid culture includes gram stain [[id number]] (Abnormal) Collected: 10/18/21 1210   Specimen: Fluid Updated: 10/19/21 1252   Gram Stain Result WHITE BLOOD CELLS PRESENT   FEW GRAM POSITIVE COCCI   QUANTITY NOT SUFFICIENT TO CONCENTRATE INTERPRET RESULTS WITH CAUTION   A Negative report does not exclude the possibility of infection because results are dependent on adequate specimen collection. Culture RARE STREPTOCOCCUS AGALACTIAE (GROUP B)   SARS COV 2 (COVID-19) [facility] ID Onsite Test [[id number]] Collected: 10/18/21 0253   Specimen: Nasopharynx Updated: 10/18/21 0335   Specimen Naso Pharynx   Sent to Testing to be performed at [address] [place] [address]. COVID-19 [facility] Report to Follow. SARS CoV 2 [[id number]] Collected: 10/18/21 0253   Specimen: Nasopharynx Updated: 10/18/21 0336   First Test? UNKNOWN   Employed in Healthcare? UNKNOWN   Symptoms Defined by CDC? NO   Symptom Onset? U^UNKNOWN   Hospitalized for Covid? UNKNOWN   ICU for Covid? UNKNOWN   Congregate Setting? UNKNOWN   Pregnant? NO   Specimen Type Naso Pharynx   SARS COV 2 Presumptive Negative     Medical Decision Making-Other:     Note:  Labs, medications, radiologic studies were reviewed with personal review of films   Large amounts of data were reviewed  Discussed with nursing Staff, Discharge planner  Infection Control and Prevention measures reviewed  All prior entries were reviewed  Administer medications as ordered  Prognosis: Guarded  Discharge planning reviewed  Follow up as outpatient. Thank you for allowing us to participate in the care of this patient. Please call with questions.     Hu Martin MD, PGY-1  Infectious Disease/ Internal Medicine Resident  [facility] [place]    ATTESTATION:    I have discussed the case, including pertinent history and exam findings with the residents and students. I have seen and examined the patient and the key elements of the encounter have been performed by me. I was present when the student obtained his information or examined the patient. I have reviewed the laboratory data, other diagnostic studies and discussed them with the residents. I have updated the medical record where necessary. I agree with the assessment, plan and orders as documented by the resident/ student.     Aidan Rhodes MD.      Pager: [phone] - Office: [phone]

## 2021-10-28 NOTE — PROGRESS NOTES
Occupational [facility] [facility]  Occupational Therapy Not Seen Note    DATE: 10/28/2021    NAME: José Antonio Grande  MRN: [id number]   : 1950      Patient not seen this date for Occupational Therapy due to: Hgb 6.8    Next Scheduled Treatment: 10/29/21    Electronically signed by HA Whitten on 10/28/2021 at 2:06 PM

## 2021-10-28 NOTE — PLAN OF CARE
Problem: Pain:  Goal: Pain level will decrease  Description: Pain level will decrease  10/28/2021 1807 by Shaina Causey RN  Outcome: Ongoing  10/28/2021 0553 by Gideon Meehan RN  Outcome: Ongoing  Goal: Control of acute pain  Description: Control of acute pain  10/28/2021 1807 by Shaina Causey RN  Outcome: Ongoing  10/28/2021 0553 by Gideon Meehan RN  Outcome: Ongoing  Goal: Control of chronic pain  Description: Control of chronic pain  10/28/2021 1807 by Shaina Causey RN  Outcome: Ongoing  10/28/2021 0553 by Gideon Meehan RN  Outcome: Ongoing     Problem: Falls - Risk of:  Goal: Will remain free from falls  Description: Will remain free from falls  10/28/2021 1807 by Shaina Causey RN  Outcome: Ongoing  10/28/2021 0553 by Gideon Meehan RN  Outcome: Ongoing  Goal: Absence of physical injury  Description: Absence of physical injury  10/28/2021 1807 by Shaina Causey RN  Outcome: Ongoing  10/28/2021 0553 by Gideon Meehan RN  Outcome: Ongoing     Problem: Skin Integrity:  Goal: Will show no infection signs and symptoms  Description: Will show no infection signs and symptoms  10/28/2021 1807 by Shaina Causey RN  Outcome: Ongoing  10/28/2021 0553 by Gideon Meehan RN  Outcome: Ongoing  Goal: Absence of new skin breakdown  Description: Absence of new skin breakdown  10/28/2021 1807 by Shaina Causey RN  Outcome: Ongoing  10/28/2021 0553 by Gideon Meehan RN  Outcome: Ongoing     Problem: Musculor/Skeletal Functional Status  Goal: Highest potential functional level  10/28/2021 1807 by Shaina Causey RN  Outcome: Ongoing  10/28/2021 0553 by Gideon Meehan RN  Outcome: Ongoing     Problem: Nutrition  Goal: Optimal nutrition therapy  10/28/2021 1807 by Shaina Causey RN  Outcome: Ongoing  10/28/2021 1505 by Mingo Paulson, NATHONY, LD  Note: Nutrition Problem #1: Inadequate oral intake  Intervention: Food and/or Nutrient Delivery: Continue Current Diet, Start Oral Nutrition Supplement  Nutritional Goals: PO intake to meet > 50% of estimated nutrition needs

## 2021-10-29 LAB
ABO/RH: NORMAL
ABSOLUTE EOS #: 0.21 K/UL (ref 0–0.44)
ABSOLUTE IMMATURE GRANULOCYTE: 0.12 K/UL (ref 0–0.3)
ABSOLUTE LYMPH #: 1.28 K/UL (ref 1.1–3.7)
ABSOLUTE MONO #: 0.47 K/UL (ref 0.1–1.2)
ALBUMIN SERPL-MCNC: 2.1 G/DL (ref 3.5–5.2)
ANION GAP SERPL CALCULATED.3IONS-SCNC: 11 MMOL/L (ref 9–17)
ANTIBODY SCREEN: NEGATIVE
ARM BAND NUMBER: NORMAL
BASOPHILS # BLD: 1 % (ref 0–2)
BASOPHILS ABSOLUTE: 0.04 K/UL (ref 0–0.2)
BLD PROD TYP BPU: NORMAL
BUN BLDV-MCNC: 30 MG/DL (ref 8–23)
BUN/CREAT BLD: ABNORMAL (ref 9–20)
CALCIUM SERPL-MCNC: 7.9 MG/DL (ref 8.6–10.4)
CHLORIDE BLD-SCNC: 99 MMOL/L (ref 98–107)
CO2: 20 MMOL/L (ref 20–31)
CREAT SERPL-MCNC: 1.08 MG/DL (ref 0.5–0.9)
CROSSMATCH RESULT: NORMAL
DIFFERENTIAL TYPE: ABNORMAL
DISPENSE STATUS BLOOD BANK: NORMAL
EOSINOPHILS RELATIVE PERCENT: 3 % (ref 1–4)
EXPIRATION DATE: NORMAL
GFR AFRICAN AMERICAN: >60 ML/MIN
GFR NON-AFRICAN AMERICAN: 50 ML/MIN
GFR SERPL CREATININE-BSD FRML MDRD: ABNORMAL ML/MIN/{1.73_M2}
GFR SERPL CREATININE-BSD FRML MDRD: ABNORMAL ML/MIN/{1.73_M2}
GLUCOSE BLD-MCNC: 144 MG/DL (ref 65–105)
GLUCOSE BLD-MCNC: 149 MG/DL (ref 65–105)
GLUCOSE BLD-MCNC: 151 MG/DL (ref 65–105)
GLUCOSE BLD-MCNC: 160 MG/DL (ref 65–105)
GLUCOSE BLD-MCNC: 168 MG/DL (ref 70–99)
HCT VFR BLD CALC: 25.3 % (ref 36.3–47.1)
HEMOGLOBIN: 7.4 G/DL (ref 11.9–15.1)
IMMATURE GRANULOCYTES: 2 %
LACTIC ACID, SEPSIS WHOLE BLOOD: 0.7 MMOL/L (ref 0.5–1.9)
LACTIC ACID, SEPSIS: NORMAL MMOL/L (ref 0.5–1.9)
LYMPHOCYTES # BLD: 16 % (ref 24–43)
MAGNESIUM: 2 MG/DL (ref 1.6–2.6)
MCH RBC QN AUTO: 26.9 PG (ref 25.2–33.5)
MCHC RBC AUTO-ENTMCNC: 29.2 G/DL (ref 28.4–34.8)
MCV RBC AUTO: 92 FL (ref 82.6–102.9)
MONOCYTES # BLD: 6 % (ref 3–12)
NRBC AUTOMATED: 0 PER 100 WBC
PDW BLD-RTO: 14.7 % (ref 11.8–14.4)
PHOSPHORUS: 4.5 MG/DL (ref 2.6–4.5)
PLATELET # BLD: 263 K/UL (ref 138–453)
PLATELET ESTIMATE: ABNORMAL
PMV BLD AUTO: 10 FL (ref 8.1–13.5)
POTASSIUM SERPL-SCNC: 5.2 MMOL/L (ref 3.7–5.3)
RBC # BLD: 2.75 M/UL (ref 3.95–5.11)
RBC # BLD: ABNORMAL 10*6/UL
SEG NEUTROPHILS: 74 % (ref 36–65)
SEGMENTED NEUTROPHILS ABSOLUTE COUNT: 6.09 K/UL (ref 1.5–8.1)
SODIUM BLD-SCNC: 130 MMOL/L (ref 135–144)
TRANSFUSION STATUS: NORMAL
UNIT DIVISION: 0
UNIT NUMBER: NORMAL
WBC # BLD: 8.2 K/UL (ref 3.5–11.3)
WBC # BLD: ABNORMAL 10*3/UL

## 2021-10-29 PROCEDURE — 6360000002 HC RX W HCPCS

## 2021-10-29 PROCEDURE — 6370000000 HC RX 637 (ALT 250 FOR IP): Performed by: NURSE PRACTITIONER

## 2021-10-29 PROCEDURE — 36415 COLL VENOUS BLD VENIPUNCTURE: CPT

## 2021-10-29 PROCEDURE — 99232 SBSQ HOSP IP/OBS MODERATE 35: CPT | Performed by: INTERNAL MEDICINE

## 2021-10-29 PROCEDURE — 85025 COMPLETE CBC W/AUTO DIFF WBC: CPT

## 2021-10-29 PROCEDURE — 97530 THERAPEUTIC ACTIVITIES: CPT

## 2021-10-29 PROCEDURE — 6370000000 HC RX 637 (ALT 250 FOR IP)

## 2021-10-29 PROCEDURE — 1200000000 HC SEMI PRIVATE

## 2021-10-29 PROCEDURE — 83605 ASSAY OF LACTIC ACID: CPT

## 2021-10-29 PROCEDURE — 6370000000 HC RX 637 (ALT 250 FOR IP): Performed by: INTERNAL MEDICINE

## 2021-10-29 PROCEDURE — 2580000003 HC RX 258

## 2021-10-29 PROCEDURE — 82947 ASSAY GLUCOSE BLOOD QUANT: CPT

## 2021-10-29 PROCEDURE — 83735 ASSAY OF MAGNESIUM: CPT

## 2021-10-29 PROCEDURE — 97535 SELF CARE MNGMENT TRAINING: CPT

## 2021-10-29 PROCEDURE — 6360000002 HC RX W HCPCS: Performed by: INTERNAL MEDICINE

## 2021-10-29 PROCEDURE — 80069 RENAL FUNCTION PANEL: CPT

## 2021-10-29 RX ADMIN — CEFTRIAXONE SODIUM 2000 MG: 2 INJECTION, POWDER, FOR SOLUTION INTRAMUSCULAR; INTRAVENOUS at 09:01

## 2021-10-29 RX ADMIN — INSULIN LISPRO 2 UNITS: 100 INJECTION, SOLUTION INTRAVENOUS; SUBCUTANEOUS at 16:50

## 2021-10-29 RX ADMIN — OXYCODONE HYDROCHLORIDE AND ACETAMINOPHEN 2 TABLET: 5; 325 TABLET ORAL at 07:20

## 2021-10-29 RX ADMIN — ENOXAPARIN SODIUM 30 MG: 30 INJECTION SUBCUTANEOUS at 09:00

## 2021-10-29 RX ADMIN — ENOXAPARIN SODIUM 30 MG: 30 INJECTION SUBCUTANEOUS at 20:06

## 2021-10-29 RX ADMIN — INSULIN LISPRO 2 UNITS: 100 INJECTION, SOLUTION INTRAVENOUS; SUBCUTANEOUS at 09:00

## 2021-10-29 RX ADMIN — SODIUM CHLORIDE, PRESERVATIVE FREE 10 ML: 5 INJECTION INTRAVENOUS at 20:06

## 2021-10-29 RX ADMIN — OXYCODONE HYDROCHLORIDE AND ACETAMINOPHEN 2 TABLET: 5; 325 TABLET ORAL at 02:15

## 2021-10-29 RX ADMIN — SODIUM CHLORIDE, PRESERVATIVE FREE 10 ML: 5 INJECTION INTRAVENOUS at 09:00

## 2021-10-29 RX ADMIN — OXYCODONE HYDROCHLORIDE AND ACETAMINOPHEN 2 TABLET: 5; 325 TABLET ORAL at 20:06

## 2021-10-29 RX ADMIN — CARVEDILOL 3.12 MG: 3.12 TABLET, FILM COATED ORAL at 09:00

## 2021-10-29 RX ADMIN — INSULIN LISPRO 2 UNITS: 100 INJECTION, SOLUTION INTRAVENOUS; SUBCUTANEOUS at 11:58

## 2021-10-29 RX ADMIN — PANTOPRAZOLE SODIUM 40 MG: 40 TABLET, DELAYED RELEASE ORAL at 09:00

## 2021-10-29 RX ADMIN — POLYETHYLENE GLYCOL 3350 17 G: 17 POWDER, FOR SOLUTION ORAL at 09:01

## 2021-10-29 RX ADMIN — MAGNESIUM CITRATE 296 ML: 1.75 LIQUID ORAL at 13:48

## 2021-10-29 RX ADMIN — INSULIN GLARGINE 35 UNITS: 100 INJECTION, SOLUTION SUBCUTANEOUS at 20:06

## 2021-10-29 ASSESSMENT — PAIN DESCRIPTION - ONSET
ONSET: SUDDEN
ONSET: ON-GOING
ONSET: ON-GOING

## 2021-10-29 ASSESSMENT — PAIN DESCRIPTION - PROGRESSION
CLINICAL_PROGRESSION: NOT CHANGED
CLINICAL_PROGRESSION: NOT CHANGED

## 2021-10-29 ASSESSMENT — PAIN DESCRIPTION - DESCRIPTORS
DESCRIPTORS: ACHING;DISCOMFORT
DESCRIPTORS: ACHING;DISCOMFORT;SORE

## 2021-10-29 ASSESSMENT — PAIN DESCRIPTION - FREQUENCY
FREQUENCY: CONTINUOUS
FREQUENCY: INTERMITTENT

## 2021-10-29 ASSESSMENT — PAIN SCALES - GENERAL
PAINLEVEL_OUTOF10: 5
PAINLEVEL_OUTOF10: 4
PAINLEVEL_OUTOF10: 2
PAINLEVEL_OUTOF10: 2
PAINLEVEL_OUTOF10: 5
PAINLEVEL_OUTOF10: 9
PAINLEVEL_OUTOF10: 5

## 2021-10-29 ASSESSMENT — PAIN - FUNCTIONAL ASSESSMENT
PAIN_FUNCTIONAL_ASSESSMENT: INTOLERABLE, UNABLE TO DO ANY ACTIVE OR PASSIVE ACTIVITIES
PAIN_FUNCTIONAL_ASSESSMENT: INTOLERABLE, UNABLE TO DO ANY ACTIVE OR PASSIVE ACTIVITIES

## 2021-10-29 ASSESSMENT — PAIN DESCRIPTION - PAIN TYPE
TYPE: ACUTE PAIN;SURGICAL PAIN
TYPE: SURGICAL PAIN

## 2021-10-29 ASSESSMENT — PAIN DESCRIPTION - LOCATION
LOCATION: KNEE

## 2021-10-29 ASSESSMENT — PAIN DESCRIPTION - ORIENTATION
ORIENTATION: LEFT
ORIENTATION: LEFT
ORIENTATION: MID
ORIENTATION: LEFT

## 2021-10-29 NOTE — PROGRESS NOTES
Occupational Therapy  Facility/Department: [address] ORTHO/MED SURG  Daily Treatment Note  NAME: Cory Aponte  : 1950  MRN: [id number]    Date of Service: 10/29/2021    Discharge Recommendations:  Patient would benefit from continued therapy after discharge       Assessment   Performance deficits / Impairments: Decreased functional mobility ; Decreased endurance;Decreased ADL status; Decreased balance;Decreased high-level IADLs;Decreased strength  Prognosis: Good  Patient Education: OT POC, transfer safety, importance of participation in therapy, pursed lip breathing, weight bearing status with good return. REQUIRES OT FOLLOW UP: Yes  Activity Tolerance  Activity Tolerance: Patient Tolerated treatment well;Patient limited by fatigue  Safety Devices  Safety Devices in place: Yes  Type of devices: Call light within reach;Gait belt;Left in bed;Nurse notified; Bed alarm in place; Patient at risk for falls         Patient Diagnosis(es): There were no encounter diagnoses. has a past medical history of Arthritis, Diabetes mellitus ([facility] [address] Hyperlipidemia, and Hypertension. has a past surgical history that includes Hysterectomy; knee surgery (Left, 10/26/2021); and Revision total knee arthroplasty (Left, 10/26/2021). Restrictions  Restrictions/Precautions  Restrictions/Precautions: Weight Bearing, General Precautions, Fall Risk, Up as Tolerated  Required Braces or Orthoses?: No  Lower Extremity Weight Bearing Restrictions  Left Lower Extremity Weight Bearing: Partial Weight Bearing  Partial Weight Bearing Percentage Or Pounds: 50% WB L LE  Position Activity Restriction  Other position/activity restrictions: Do not attempt to bend through L knee, up with A. Wound vac.   Subjective   General  Patient assessed for rehabilitation services?: Yes  Family / Caregiver Present: Yes (Daughter)  Diagnosis: L knee periprosthetic joint infection, antibiotic spacer placed, nailing on 10/26  Pain Assessment  Pain Assessment:

## 2021-10-29 NOTE — PROGRESS NOTES
[facility]  Office: [address], DO, Veronica Castle, DO, Daisy Jeffries, DO, Lesli Winston Blood, DO, Rivka Spangler MD, Hosea De Guzman MD, Toni Hammond MD, Claudean Millard, MD, Sonia Ricks MD, Marcelle Archer MD, Nisha Burgos MD, Artem Segura MD, Emir Alcazar, DO, Sandra Saucedo DO, Lizzie Dixon MD,  Mitch Truong DO, Migel Morales MD, Carissa Sin MD, Janel Villaseñor MD, Deyanira Roberts MD, Eder Chand MD, Precious Montiel MD, Melanie Garcia, [facility], [facility], CNP, Radha Vieyra, CNP, Juventino Contreras, CNS, Bree Katz, CNP, Ondina Hood, CNP, Emma Ahuja, CNP, Mc David, CNP, Mackenzie Pittman, CNP, Alisson Garcia, CNP, Nora Wilson, PA-C, Parris Zhou, [facility], Kayy Hubbard, CNP, Moris Blanton, CNP, Brenda Ibrahim, CNP, Abdias Louis, CNP, Kassandra Cleary, CNP, Renuka Wise, [facility], Tim Saint, [address] [place] [address]    Progress Note    10/29/2021    8:41 AM    Name:   Shazia Salas  MRN:     [id number]     Acct:      [de-identified]   Room:   [address] [place] [address] Day:  8  Admit Date:  10/21/2021 10:51 PM    PCP:   Desiree Main  Code Status:  Full Code    Subjective:     C/C: arthritis  Interval History Status: not changed. Patient seen and examined bedside. Not had a bowel movement. Otherwise she is doing well. Patient vitals have been stable. Brief History:     80 y.o. female being seen for a left knee periprosthetic joint infection. Patient overall feeling well aside from pain in left knee. Plan for OR today for explantation and antibiotic spacer.     Review of Systems:     Constitutional:  negative for chills, fevers, sweats  Respiratory:  negative for cough, dyspnea on exertion, shortness of breath, wheezing  Cardiovascular:  negative for chest pain, chest pressure/discomfort, lower extremity edema, palpitations  Gastrointestinal:  negative for abdominal pain, constipation, diarrhea, nausea, vomiting  Neurological:  negative for dizziness, headache has some light headedness. EXT: knee pain is improving, admits to decreased range of motion but doing well. Medications: Allergies: Allergies   Allergen Reactions    Bactrim [Sulfamethoxazole-Trimethoprim]     Codeine     Lisinopril Other (See Comments)     cough       Current Meds:   Scheduled Meds:    polyethylene glycol  17 g Oral Daily    magnesium citrate  296 mL Oral Once    insulin glargine  35 Units SubCUTAneous Nightly    enoxaparin  30 mg SubCUTAneous BID    amLODIPine  10 mg Oral Daily    carvedilol  3.125 mg Oral BID WC    sodium chloride flush  5-40 mL IntraVENous 2 times per day    sodium chloride flush  5-40 mL IntraVENous 2 times per day    [Held by provider] losartan  100 mg Oral Daily    pantoprazole  40 mg Oral QAM AC    insulin lispro  0-12 Units SubCUTAneous TID WC    cefTRIAXone (ROCEPHIN) IV  2,000 mg IntraVENous Q24H    insulin lispro  0-6 Units SubCUTAneous Nightly     Continuous Infusions:    sodium chloride      sodium chloride      sodium chloride      dextrose      sodium chloride       PRN Meds: oxyCODONE-acetaminophen, sodium chloride, fentanNYL, sodium chloride flush, sodium chloride flush, sodium chloride flush, sodium chloride, sodium chloride flush, sodium chloride, acetaminophen, glucose, dextrose, glucagon (rDNA), dextrose, sodium chloride flush, sodium chloride, ondansetron **OR** ondansetron, acetaminophen **OR** acetaminophen    Data:     Past Medical History:   has a past medical history of Arthritis, Diabetes mellitus ([facility] [address] Hyperlipidemia, and Hypertension. Social History:   reports that she has never smoked. She does not have any smokeless tobacco history on file. She reports that she does not drink alcohol and does not use drugs. Family History: No family history on file.     Vitals:  BP (!) 137/51   Pulse 83   Temp 98.4 °F (36.9 °C) (Oral)   Resp 20   Ht 5' 4\" (1.626 m) Wt (!) 304 lb (137.9 kg)   SpO2 100%   BMI 52.18 kg/m²   Temp (24hrs), Av.7 °F (37.1 °C), Min:98.4 °F (36.9 °C), Max:98.8 °F (37.1 °C)    Recent Labs     10/28/21  1107 10/28/21  1627 10/28/21  2105 10/29/21  0730   POCGLU 270* 150* 199* 144*       I/O (24Hr): Intake/Output Summary (Last 24 hours) at 10/29/2021 0841  Last data filed at 10/29/2021 1658  Gross per 24 hour   Intake 753 ml   Output 2850 ml   Net -2097 ml       Labs:  Hematology:  Recent Labs     10/27/21  0544 10/28/21  0636 10/28/21  1822   WBC 11.7* 11.7*  --    RBC 2.81* 2.54*  --    HGB 7.5* 6.8* 7.2*   HCT 25.4* 23.1* 23.5*   MCV 90.4 90.9  --    MCH 26.7 26.8  --    MCHC 29.5 29.4  --    RDW 14.6* 14.8*  --     278  --    MPV 10.2 10.2  --    CRP  --  162.1*  --      Chemistry:  Recent Labs     10/27/21  0544 10/28/21  0636   * 132*   K 5.5* 5.1    99   CO2 17* 20   GLUCOSE 264* 142*   BUN 25* 29*   CREATININE 0.98* 1.15*   ANIONGAP 11 13   LABGLOM 56* 47*   GFRAA >60 57*   CALCIUM 7.6* 8.2*     Recent Labs     10/27/21  2114 10/28/21  0627 10/28/21  1107 10/28/21  1627 10/28/21  2105 10/29/21  0730   POCGLU 166* 143* 270* 150* 199* 144*     ABG:No results found for: POCPH, PHART, PH, POCPCO2, KHG2DJA, PCO2, POCPO2, PO2ART, PO2, POCHCO3, ZQE8BFC, HCO3, NBEA, PBEA, BEART, BE, THGBART, THB, VSG6KWX, EOOW0NID, L5UYYEOP, O2SAT, FIO2  Lab Results   Component Value Date/Time    SPECIAL RT 4ML 10/21/2021 11:00 PM     Lab Results   Component Value Date/Time    CULTURE NO GROWTH 6 DAYS 10/21/2021 11:00 PM       Radiology:  XR FEMUR LEFT (MIN 2 VIEWS)    Result Date: 10/22/2021  1. No acute osseous abnormality in the left femur or tibia/fibula. 2.  Left knee arthroplasty in place without evidence for hardware loosening. 3.  Leg length measurements provided above. Please note limitations and landmarks used. XR KNEE LEFT (3 VIEWS)    Result Date: 10/22/2021  1. Soft tissue edema and effusion.  2. No acute osseous abnormality. XR TIBIA FIBULA LEFT (2 VIEWS)    Result Date: 10/22/2021  1. No acute osseous abnormality in the left femur or tibia/fibula. 2.  Left knee arthroplasty in place without evidence for hardware loosening. 3.  Leg length measurements provided above. Please note limitations and landmarks used. XR CHEST PORTABLE    Result Date: 10/22/2021  1. Aberrant positioning of the right upper extremity PICC line. Tip is either within the distal left subclavian vein, or directed into the azygous vein. Repositioning is recommended 2. Report was marked to be called to a licensed caregiver     NM Cardiac Stress Test Nuclear Imaging    Result Date: 10/25/2021  Perfusion:  Stress-induced reversible defects as above involving 12% of the left ventricular myocardium at stress. Function:  Normal left ventricular ejection fraction of 55%. Mild perfusion defect in the apex and mid inferior wall. Risk stratification: HIGH DUE TO PERFUSION DEFECTS. Notes concerning risk stratification: Risk stratification incorporates both clinical history and some testing results. Final risk determination is the responsibility of the ordering provider as other patient information and test results may increase or decrease the risk assessment reported for this examination. Risk stratification criteria are adapted from \"Noninvasive Risk Stratification\" criteria from [employer], ACC/AATS/AHA/ASE/ASNC/SCAI/SCCT/STS 2017 Appropriate Use Criteria For Coronary Revascularization in Patients With Stable Ischemic Heart Disease [facility] Volume 69, Issue 17, May 2017 High risk (>3% annual death or MI) 1. Severe resting LV dysfunction (LVEF <35%) not readily explained by non coronary causes 2. Resting perfusion abnormalities greater than 10% of the myocardium in patients without prior history or evidence of MI3.  Stress-induced perfusion abnormalities encumbering greater than or equal to 10% myocardium or stress segmental scores indicating multiple vascular territories with abnormalities 4. Stress-induced LV dilatation (TID ratio greater than 1.19 for exercise and greater than 1.39 for regadenoson) Intermediate risk (1% to 3% annual death or MI) 1. Mild/moderate resting LV dysfunction (LVEF 35% to 49%) not readily explained by non coronary causes. 2. Resting perfusion abnormalities in 5%-9.9% of the myocardium in patients without a history or prior evidence of MI 3. Stress-induced perfusion abnormality encumbering 5%-9.9% of the myocardium or stress segmental scores indicating 1 vascular territory with abnormalities but without LV dilation 4. Small wall motion abnormality involving 1-2 segments and only 1 coronary bed. Low Risk (Less than 1% annual death or MI) 1. Normal or small myocardial perfusion defect at rest or with stress encumbering less than 5% of the myocardium. Physical Examination:        General appearance:  alert, cooperative and no distress  Mental Status:  oriented to person, place and time and normal affect  Lungs:  clear to auscultation bilaterally, normal effort  Heart:  regular rate and rhythm, no murmur  Abdomen:  soft, nontender, nondistended, normal bowel sounds, no masses, hepatomegaly, splenomegaly  Extremities:  no edema, redness, tenderness in the calves  Skin:  no gross lesions, rashes, induration. Knee incision c/d/i, drain in place.      Assessment:        Hospital Problems         Last Modified POA    * (Principal) Staphylococcal arthritis of left knee ([address] 10/22/2021 Yes    Type 2 diabetes mellitus without complication, without long-term current use of insulin ([address] 10/22/2021 Yes    PITA on CPAP 10/22/2021 Yes    CAD (coronary artery disease) 10/22/2021 Yes    COPD (chronic obstructive pulmonary disease) ([address] 10/22/2021 Yes    Primary hypertension 10/22/2021 Yes    Acute kidney injury superimposed on CKD ([address] 10/22/2021 Yes    Normocytic normochromic anemia 10/22/2021 Yes          Plan:        Left knee periprosthetic joint infection: S/p knee explant, Irrigation/debridement, with placement of static antibiotic spacer and ortho fusion nail on 10/27. Continue Rocephin 2 gm IV every 24 hours  For one month followed by long term oral supression therapy. Will consult IV team for midline. Needs follow up with Dr. Kira Horne in 3 weeks. Blood cultures NGDT . Post operative anemia: Received one unit of PRBC on 10/28 for anemia, tachycardia and hgb of 6.8. Tsat: 9, Ferritin 372. Pt will need Iron replacement after infection clears. Constipation: Start bowel regimen. Add Mag citrate today. Acute Kidney injury on Chronic Kidney disease stage 1: Resolved. Scr back to baseline  Non anion gap metabolic acidosis: stop normal saline  Mild Hyperkalemia: Monitor potassium. Hyponatremia: 2/2 osmotic drag from hyperglycemia with component of SIADH due to pain/sepsis. Urine sodium 63, urine osm 403  Normocytic normochromic anemia: Blood loss anemia+/-functional NOY and anemia of inflammation. Tsat 9%. Iron therapy once acute infection resolves. Monitor Hgb, transfuse prn for Hgb <7. Diabetes mellitus type II with Hyperglycemia: Continue Lantus 35 units QHS, ISS  Morbid Obesity BMI of 51: recommend weight loss and lifestyle modification  Uncontrolled Hypertension-improving: continue home antihypertensive regimen. Continue Coreg 3.125, Norvasc, hold Losartan given increasing creatinine . Suspected PITA: needs outpt sleep study  DVT ppx  PT/OT    Dispo: Waiting on SNF placement.    Shabbir Taveras DO  10/29/2021  8:41 AM

## 2021-10-29 NOTE — PROGRESS NOTES
Physical Therapy  Facility/Department: [address] ORTHO/MED SURG  Daily Treatment Note  NAME: Lee Chandra  : 1950  MRN: [id number]    Date of Service: 10/29/2021    Discharge Recommendations:  Patient would benefit from continued therapy after discharge   PT Equipment Recommendations  Equipment Needed: No    Assessment   Body structures, Functions, Activity limitations: Decreased functional mobility ; Decreased strength;Decreased endurance  Assessment: Pt requiring MOD A x2 for bed mobility and MAX A x2 to attempt standing. Recommend continued PT after d/c to address deficits  Prognosis: Good  REQUIRES PT FOLLOW UP: Yes  Activity Tolerance  Activity Tolerance: Patient limited by fatigue;Patient limited by pain     Patient Diagnosis(es): There were no encounter diagnoses. has a past medical history of Arthritis, Diabetes mellitus ([facility] [address] Hyperlipidemia, and Hypertension. has a past surgical history that includes Hysterectomy; knee surgery (Left, 10/26/2021); and Revision total knee arthroplasty (Left, 10/26/2021). Restrictions  Restrictions/Precautions  Restrictions/Precautions: Weight Bearing, General Precautions, Fall Risk, Up as Tolerated  Required Braces or Orthoses?: No  Lower Extremity Weight Bearing Restrictions  Left Lower Extremity Weight Bearing: Partial Weight Bearing  Partial Weight Bearing Percentage Or Pounds: 50% WB L LE  Position Activity Restriction  Other position/activity restrictions: Do not attempt to bend through L knee (fused)  up with A. Wound vac. Subjective   General  Response To Previous Treatment: Patient with no complaints from previous session.   Family / Caregiver Present: Yes  Subjective  Subjective: Pt resting in bed upon arrival, agreeable to PT, pleasant and cooperative  Pain Screening  Patient Currently in Pain: Yes  Pain Assessment  Pain Assessment: 0-10  Pain Level: 5  Pain Type: Acute pain;Surgical pain  Pain Location: Knee  Vital Signs  Patient Currently in Pain: Yes  Pre Treatment Pain Screening  Intervention List: Patient able to continue with treatment    Orientation  Orientation  Overall Orientation Status: Within Normal Limits  Cognition      Objective   Bed mobility  Rolling to Left: Minimal assistance  Rolling to Right: Minimal assistance  Supine to Sit: Moderate assistance;2 Person assistance  Sit to Supine: Moderate assistance;2 Person assistance  Scooting:  Moderate assistance;2 Person assistance  Comment: increased time to complete due to pain, weakness, uinary incontinence  Transfers  Sit to Stand: 2 Person Assistance;Maximum Assistance  Stand to sit: Moderate Assistance;2 Person Assistance  Comment: Attempted sit to stand transfers with RW and MAX A x 2, unable to fully stand  Ambulation  Ambulation?: No     Balance  Posture: Good  Sitting - Static: Fair;+  Sitting - Dynamic: Fair;+ (Pt sat EOB x ~ 12 min, requirng CGA for safety, once seated balance established)  Standing - Static: Poor (Attempted sit to stand with RW and MAX A x2, unable to fully stand)       AM-PAC Score  -PAC Inpatient Mobility Raw Score : 8 (10/29/21 1231)  -PAC Inpatient T-Scale Score : 28.52 (10/29/21 [facility])  Mobility Inpatient CMS 0-100% Score: 86.62 (10/29/21 [facility])  Mobility Inpatient CMS G-Code Modifier : CM (10/29/21 1231)          Goals  Short term goals  Time Frame for Short term goals: 10 visits  Short term goal 1: transfers with SBA  Short term goal 2: amb 50 ft with a RW x SBA  Short term goal 3: ascend/descend 4 steps with SBA  Short term goal 4: 20 min exercise program x SBA  Patient Goals   Patient goals : Return home    Plan    Plan  Times per week: 5-6x wk  Current Treatment Recommendations: Strengthening, Functional Mobility Training, Transfer Training, Gait Training, Safety Education & Training, Endurance Training, Stair training  Safety Devices  Type of devices: Nurse notified, Left in bed, Call light within reach, Patient at risk for falls, Bed alarm in place  Restraints  Initially in place: No     Therapy Time   Individual Concurrent Group Co-treatment   Time In 0910         Time Out 0953         Minutes 43         Timed Code Treatment Minutes: Christelle Chiu 19, [place]

## 2021-10-29 NOTE — PROGRESS NOTES
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 Stop date 11-20-21)  Long term oral antibiotic suppression following the above  Will need midline for IV antibiotics  Orthopedic surgery following-  S/p  explantation and antibiotic spacer device placement 10-26-21. Office f/up in 3 weeks with Dr Addison Lamar for infection. Please call [phone] for appointment     Medical Decision Making/Summary/Discussion:10/29/2021       Infection Control Recommendations   [place] Precautions      Antimicrobial Stewardship Recommendations     Simplification of therapy    Coordination of Outpatient Care:   Estimated Length of IV antimicrobials:4 weeks  Patient will need Midline Catheter Insertion: Yes  Patient will need PICC line Insertion:No  Patient will need: Home IV , [place],  SNF,  LTAC: TBD  Patient will need outpatient wound care:Yes    Chief complaint/reason for consultation:   Lt knee pyogenic arthritis      History of Present Illness:   Edgar Cloud is a 80y.o.-year-old female who was initially admitted on 10/21/2021. Patient seen at the request of Dr. Madalyn José. INITIAL HISTORY:    Patient with a Hx of prior Lt TKA  In 2007 at [facility] under Dr Tahira Hathaway, She subsequently experienced  Lt knee infections which required additional surgeries x 2. She has underlying DM 2, Essential HTN, CKD,PITA and COPD. Presented to [facility] because of acute onset of pain with ambulation. The knee joint was aspirated and grew Strep. Agalactiae.  She was started on antibiotics (ceftriaxone) and was awaiting evaluation at tertiary care center because of her Hx of prior surgeries and infections. [facility] refused transfer. She presented to [facility] because of ongoing severe pain, erythema and inflammation. Ortho has evaluated and plan intervention on 10-25-21. CURRENT EVALUATION : 10/29/2021    Afebrile  VS stable    Had pre-op evaluation by Cardiology including stress test.  Patient had positive stress test.   S/p left heart cath 10/26: normal coronary arteries, preserved LV systolic function    Orthopedic Surgery performed  Left knee I&D with fusion nail on 10/26/21 explant/spacer placement    Patient has been doing well post-op. She complained constipation. Received 1 unit pRBC on 10/28/21 for Hgb 6.8     Denies any fever, chills, nausea, vomiting. She will need IV ceftriaxone x 4 weeks and then long term oral antibiotic suppression. Labs, X rays reviewed: 10/29/2021    BUN: 40-->22-->25-->29  Cr: 1.57-->0.78-->0.98-->1.15    WBC: 10.4-->7.8-->11.7  Hb:8.9-->8.5-->7.5-->6.8-->7.2   Plat: 207-->278-->269-->278    Cultures:  Urine:    Blood:  10/21/21: no growth  Sputum :    Wound:  Knee aspirate: Strep agalactiae      COVID rapid 10/22/21: negative    Discussed with patient, RN, MEMO. I have personally reviewed the past medical history, past surgical history, medications, social history, and family history, and I have updated the database accordingly.   Past Medical History:     Past Medical History:   Diagnosis Date    Arthritis     Diabetes mellitus ([address]     Hyperlipidemia     Hypertension        Past Surgical  History:     Past Surgical History:   Procedure Laterality Date    HYSTERECTOMY      KNEE SURGERY Left 10/26/2021    KNEE EXPLANT, IRRIGATION & DEBRIDEMENT, STATIC ANTIBIOTIC SPACER, LINK ORTHO FUSION NAIL SET    REVISION TOTAL KNEE ARTHROPLASTY Left 10/26/2021    KNEE EXPLANT, IRRIGATION & DEBRIDEMENT, STATIC ANTIBIOTIC SPACER, LINK ORTHO FUSION NAIL SET- performed by Amy Bell DO at [facility] OR       Medications:      polyethylene glycol  17 g Oral Daily    magnesium citrate  296 mL Oral Once    insulin glargine  35 Units SubCUTAneous Nightly    enoxaparin  30 mg SubCUTAneous BID    amLODIPine  10 mg Oral Daily    carvedilol  3.125 mg Oral BID WC    sodium chloride flush  5-40 mL IntraVENous 2 times per day    sodium chloride flush  5-40 mL IntraVENous 2 times per day    [Held by provider] losartan  100 mg Oral Daily    pantoprazole  40 mg Oral QAM AC    insulin lispro  0-12 Units SubCUTAneous TID WC    cefTRIAXone (ROCEPHIN) IV  2,000 mg IntraVENous Q24H    insulin lispro  0-6 Units SubCUTAneous Nightly       Social History:     Social History     Socioeconomic History    Marital status:      Spouse name: Not on file    Number of children: Not on file    Years of education: Not on file    Highest education level: Not on file   Occupational History    Not on file   Tobacco Use    Smoking status: Never Smoker   Substance and Sexual Activity    Alcohol use: No    Drug use: No    Sexual activity: Not on file   Other Topics Concern    Not on file   Social History Narrative    Not on file     Social Determinants of Health     Financial Resource Strain:     Difficulty of Paying Living Expenses:    Food Insecurity:     Worried About Running Out of Food in the Last Year:     [address] in the Last Year:    Transportation Needs:     Lack of Transportation (Medical):      Lack of Transportation (Non-Medical):    Physical Activity:     Days of Exercise per Week:     Minutes of Exercise per Session:    Stress:     Feeling of Stress :    Social Connections:     Frequency of Communication with Friends and Family:     Frequency of Social Gatherings with Friends and Family:     Attends [employer] Services:     Active Member of Clubs or Organizations:     Attends Club or Organization Meetings:     Marital Status:    Intimate Partner Violence:     Fear of Current or Ex-Partner:     Emotionally Abused:     Physically Abused:     Sexually Abused:        Family History:   No family history on file. Allergies:   Bactrim [sulfamethoxazole-trimethoprim], Codeine, and Lisinopril     Review of Systems:   Constitutional: No fevers or chills. No systemic complaints  Head: No headaches  Eyes: No double vision or blurry vision. No conjunctival inflammation. ENT: No sore throat or runny nose. . No hearing loss, tinnitus or vertigo. Cardiovascular: No chest pain or palpitations. No shortness of breath. No FRANCES  Lung: No shortness of breath or cough. No sputum production  Abdomen: No nausea, vomiting, diarrhea, or abdominal pain. Radha Raddle No cramps. Genitourinary: No increased urinary frequency, or dysuria. No hematuria. No suprapubic or CVA pain  Musculoskeletal: No muscle aches or pains. No joint effusions, swelling or deformities. Lt knee effusion  Hematologic: No bleeding or bruising. Neurologic: No headache, weakness, numbness, or tingling. Integument: No rash, no ulcers. Lt knee erythema  Psychiatric: No depression. Endocrine: No polyuria, no polydipsia, no polyphagia. Physical Examination :     Patient Vitals for the past 8 hrs:   BP Temp Temp src Pulse Resp SpO2   10/29/21 1130 (!) 133/50 98 °F (36.7 °C) Oral 80 20 94 %   10/29/21 0733 (!) 137/51 98.4 °F (36.9 °C) Oral 83 20 100 %     General Appearance: Awake, alert, and in no apparent distress  Head:  Normocephalic, no trauma  Eyes: Pupils equal, round, reactive to light and accommodation; extraocular movements intact; sclera anicteric; conjunctivae pink. No embolic phenomena. ENT: Oropharynx clear, without erythema, exudate, or thrush. No tenderness of sinuses. Mouth/throat: mucosa pink and moist. No lesions. Dentition in good repair. Neck:Supple, without lymphadenopathy. Thyroid normal, No bruits. Pulmonary/Chest: Clear to auscultation, without wheezes, rales, or rhonchi.   No dullness to percussion. Cardiovascular: Regular rate and rhythm without murmurs, rubs, or gallops. Abdomen: Soft, non tender. Bowel sounds normal. No organomegaly  All four Extremities: No cyanosis, clubbing, edema. Lt knee effusion and erythema. Neurologic: No gross sensory or motor deficits. Skin: Warm and dry with good turgor. No signs of peripheral arterial or venous insufficiency. No ulcerations. No open wounds. Medical Decision Making -Laboratory:   I have independently reviewed/ordered the following labs:    CBC with Differential:   Recent Labs     10/27/21  0544 10/27/21  0544 10/28/21  0636 10/28/21  1822   WBC 11.7*  --  11.7*  --    HGB 7.5*   < > 6.8* 7.2*   HCT 25.4*   < > 23.1* 23.5*     --  278  --    LYMPHOPCT 7*  --  15*  --    MONOPCT 4  --  7  --     < > = values in this interval not displayed. BMP:   Recent Labs     10/27/21  0544 10/28/21  0636   * 132*   K 5.5* 5.1    99   CO2 17* 20   BUN 25* 29*   CREATININE 0.98* 1.15*     Hepatic Function Panel: No results for input(s): PROT, LABALBU, BILIDIR, IBILI, BILITOT, ALKPHOS, ALT, AST in the last 72 hours. No results for input(s): RPR in the last 72 hours. No results for input(s): HIV in the last 72 hours. No results for input(s): BC in the last 72 hours. Lab Results   Component Value Date    MUCUS NOT REPORTED 10/22/2021    RBC 2.54 10/28/2021    TRICHOMONAS NOT REPORTED 10/22/2021    WBC 11.7 10/28/2021    YEAST NOT REPORTED 10/22/2021    TURBIDITY Clear 10/22/2021     Lab Results   Component Value Date    CREATININE 1.15 10/28/2021    GLUCOSE 142 10/28/2021       Medical Decision Making-Imaging:     EXAMINATION:   2 X-RAY VIEWS OF THE LEFT TIBIA AND FIBULA; 2 X-RAY VIEWS OF THE LEFT FEMUR.    RULER WAS USED.       10/22/2021 9:46 am       COMPARISON:   Left knee radiographs dated 10/21/2021       HISTORY:   ORDERING SYSTEM PROVIDED HISTORY: leg length eval   TECHNOLOGIST PROVIDED HISTORY:   leg length eval Reason for Exam: leg length eval; Best possible images at this time due to pt   condition . -JEK/GW       FINDINGS:   Left femur: Left femoral head is not visualized due to overlying soft tissue   attenuation.  The superior tip of the greater trochanter is used as a   landmark.  Femoral length from the superior tip of the greater trochanter to   the medial femoral condyle is approximately 39.2 cm.       Left total knee arthroplasty is in place.  No acute fracture of the left   femur.  No evidence of hardware loosening.       Left leg: Distance from the medial femoral condyle to the mid tibial plafond   is 36 cm, which includes the polyethylene liner in the knee arthroplasty. (Total leg length of approximately 75.2 cm from superior tip of greater   trochanter to mid tibial plafond).  The liner measures up to 2 cm in   thickness.       No acute fracture involving the tibia or fibula.  No periprosthetic lucency   in the proximal tibia.  Ankle mortise alignment is preserved.  Scattered   dystrophic calcifications in the leg.           Impression   1.  No acute osseous abnormality in the left femur or tibia/fibula.       2.  Left knee arthroplasty in place without evidence for hardware loosening.       3.  Leg length measurements provided above.  Please note limitations and   landmarks used.         CT extremity lower left with contrast    Result Date: 10/18/2021  History: Acute left lower leg and ankle redness Exam/Technique: Thin axial images through the left lower extremity were obtained from the superior aspect of the acetabulum to the left foot following the intravenous demonstration of 100 mL Omnipaque 300. Study was supplemented by sagittal and coronal reconstructed images. Comparison: None Findings: There is a left knee arthroplasty in place. Full evaluation of the area of the knee is compromised by extensive metallic artifact.  There is a joint effusion of the left knee with fluid seen in the supra patellar bursa. There is no evidence for an acute osseous abnormality. No lytic or blastic processes are seen. No evidence of osteolysis demonstrated. No soft tissue masses or fluid collections are identified. IMPRESSION: 1. Left knee joint effusion with a left knee arthroplasty in place. 2. Otherwise normal CT of the left lower extremity. Workstation:[id number] Finalized by Chris Michael MD on 10/18/2021     Medical Decision [id number]:   Microbiology Results   Procedure Component Value Units Date/Time   Body fluid culture includes gram stain [[id number]] (Abnormal) Collected: 10/18/21 1210   Specimen: Fluid Updated: 10/19/21 1252   Gram Stain Result WHITE BLOOD CELLS PRESENT   FEW GRAM POSITIVE COCCI   QUANTITY NOT SUFFICIENT TO CONCENTRATE INTERPRET RESULTS WITH CAUTION   A Negative report does not exclude the possibility of infection because results are dependent on adequate specimen collection. Culture RARE STREPTOCOCCUS AGALACTIAE (GROUP B)   SARS COV 2 (COVID-19) [facility] ID Onsite Test [[id number]] Collected: 10/18/21 0253   Specimen: Nasopharynx Updated: 10/18/21 0335   Specimen Naso Pharynx   Sent to Testing to be performed at [address] [place] [address]. COVID-19 [facility] Report to Follow. SARS CoV 2 [[id number]] Collected: 10/18/21 0253   Specimen: Nasopharynx Updated: 10/18/21 0336   First Test? UNKNOWN   Employed in Healthcare? UNKNOWN   Symptoms Defined by CDC? NO   Symptom Onset? U^UNKNOWN   Hospitalized for Covid? UNKNOWN   ICU for Covid? UNKNOWN   Congregate Setting? UNKNOWN   Pregnant?  NO   Specimen Type Naso Pharynx   SARS COV 2 Presumptive Negative     Medical Decision Making-Other:     Note:  Labs, medications, radiologic studies were reviewed with personal review of films   Large amounts of data were reviewed  Discussed with nursing Staff, Discharge planner  Infection Control and Prevention measures reviewed  All prior entries were reviewed  Administer medications as ordered  Prognosis: Guarded  Discharge planning reviewed  Follow up as outpatient. Thank you for allowing us to participate in the care of this patient. Please call with questions. Ventura Petit MD, PGY-1  Infectious Disease/ Internal Medicine Resident  [place], [place]      ATTESTATION:    I have discussed the case, including pertinent history and exam findings with the residents and students. I have seen and examined the patient and the key elements of the encounter have been performed by me. I was present when the student obtained his information or examined the patient. I have reviewed the laboratory data, other diagnostic studies and discussed them with the residents. I have updated the medical record where necessary. I agree with the assessment, plan and orders as documented by the resident/ student.     Mallory Jhaveri MD.    Pager: [phone] - Office: [phone]

## 2021-10-29 NOTE — PROGRESS NOTES
Patient has sepsis score of 5.33. BP is 134/47 HR 87. Notified Dr. Armando Johnson via [employer]. Will continue to monitor.

## 2021-10-29 NOTE — PLAN OF CARE
Problem: Falls - Risk of:  Goal: Will remain free from falls  Description: Will remain free from falls  Outcome: Met This Shift  Goal: Absence of physical injury  Description: Absence of physical injury  Outcome: Met This Shift     Problem: Pain:  Goal: Pain level will decrease  Description: Pain level will decrease  Outcome: Ongoing  Goal: Control of acute pain  Description: Control of acute pain  Outcome: Ongoing  Goal: Control of chronic pain  Description: Control of chronic pain  Outcome: Ongoing     Problem: Skin Integrity:  Goal: Will show no infection signs and symptoms  Description: Will show no infection signs and symptoms  Outcome: Ongoing  Goal: Absence of new skin breakdown  Description: Absence of new skin breakdown  Outcome: Ongoing     Problem: Musculor/Skeletal Functional Status  Goal: Highest potential functional level  Outcome: Ongoing     Problem: Nutrition  Goal: Optimal nutrition therapy  Outcome: Ongoing

## 2021-10-30 ENCOUNTER — APPOINTMENT (OUTPATIENT)
Dept: GENERAL RADIOLOGY | Age: 71
DRG: 464 | End: 2021-10-30
Attending: STUDENT IN AN ORGANIZED HEALTH CARE EDUCATION/TRAINING PROGRAM
Payer: COMMERCIAL

## 2021-10-30 LAB
ABSOLUTE EOS #: 0.13 K/UL (ref 0–0.44)
ABSOLUTE EOS #: 0.17 K/UL (ref 0–0.44)
ABSOLUTE IMMATURE GRANULOCYTE: 0.13 K/UL (ref 0–0.3)
ABSOLUTE IMMATURE GRANULOCYTE: 0.16 K/UL (ref 0–0.3)
ABSOLUTE LYMPH #: 1.18 K/UL (ref 1.1–3.7)
ABSOLUTE LYMPH #: 1.28 K/UL (ref 1.1–3.7)
ABSOLUTE MONO #: 0.48 K/UL (ref 0.1–1.2)
ABSOLUTE MONO #: 0.55 K/UL (ref 0.1–1.2)
ALBUMIN SERPL-MCNC: 1.9 G/DL (ref 3.5–5.2)
ALBUMIN SERPL-MCNC: 2.3 G/DL (ref 3.5–5.2)
ALLEN TEST: ABNORMAL
ANION GAP SERPL CALCULATED.3IONS-SCNC: 11 MMOL/L (ref 9–17)
ANION GAP SERPL CALCULATED.3IONS-SCNC: 14 MMOL/L (ref 9–17)
BASOPHILS # BLD: 0 % (ref 0–2)
BASOPHILS # BLD: 1 % (ref 0–2)
BASOPHILS ABSOLUTE: 0.04 K/UL (ref 0–0.2)
BASOPHILS ABSOLUTE: 0.06 K/UL (ref 0–0.2)
BUN BLDV-MCNC: 28 MG/DL (ref 8–23)
BUN BLDV-MCNC: 32 MG/DL (ref 8–23)
BUN/CREAT BLD: ABNORMAL (ref 9–20)
BUN/CREAT BLD: ABNORMAL (ref 9–20)
C-REACTIVE PROTEIN: 169.1 MG/L (ref 0–5)
CALCIUM SERPL-MCNC: 8.1 MG/DL (ref 8.6–10.4)
CALCIUM SERPL-MCNC: 8.5 MG/DL (ref 8.6–10.4)
CARBOXYHEMOGLOBIN: 1.5 % (ref 0–5)
CHLORIDE BLD-SCNC: 98 MMOL/L (ref 98–107)
CHLORIDE BLD-SCNC: 98 MMOL/L (ref 98–107)
CO2: 18 MMOL/L (ref 20–31)
CO2: 20 MMOL/L (ref 20–31)
CREAT SERPL-MCNC: 1.05 MG/DL (ref 0.5–0.9)
CREAT SERPL-MCNC: 1.1 MG/DL (ref 0.5–0.9)
DIFFERENTIAL TYPE: ABNORMAL
DIFFERENTIAL TYPE: ABNORMAL
EOSINOPHILS RELATIVE PERCENT: 2 % (ref 1–4)
EOSINOPHILS RELATIVE PERCENT: 2 % (ref 1–4)
FIO2: ABNORMAL
GFR AFRICAN AMERICAN: 60 ML/MIN
GFR AFRICAN AMERICAN: >60 ML/MIN
GFR NON-AFRICAN AMERICAN: 49 ML/MIN
GFR NON-AFRICAN AMERICAN: 52 ML/MIN
GFR SERPL CREATININE-BSD FRML MDRD: ABNORMAL ML/MIN/{1.73_M2}
GLUCOSE BLD-MCNC: 153 MG/DL (ref 65–105)
GLUCOSE BLD-MCNC: 198 MG/DL (ref 65–105)
GLUCOSE BLD-MCNC: 199 MG/DL (ref 70–99)
GLUCOSE BLD-MCNC: 205 MG/DL (ref 70–99)
GLUCOSE BLD-MCNC: 206 MG/DL (ref 65–105)
GLUCOSE BLD-MCNC: 220 MG/DL (ref 65–105)
GLUCOSE BLD-MCNC: 227 MG/DL (ref 65–105)
HCO3 VENOUS: 23.8 MMOL/L (ref 24–30)
HCT VFR BLD CALC: 25.3 % (ref 36.3–47.1)
HCT VFR BLD CALC: 25.9 % (ref 36.3–47.1)
HEMOGLOBIN: 7.4 G/DL (ref 11.9–15.1)
HEMOGLOBIN: 7.9 G/DL (ref 11.9–15.1)
IMMATURE GRANULOCYTES: 2 %
IMMATURE GRANULOCYTES: 2 %
LYMPHOCYTES # BLD: 13 % (ref 24–43)
LYMPHOCYTES # BLD: 15 % (ref 24–43)
MCH RBC QN AUTO: 26.8 PG (ref 25.2–33.5)
MCH RBC QN AUTO: 27.3 PG (ref 25.2–33.5)
MCHC RBC AUTO-ENTMCNC: 28.6 G/DL (ref 28.4–34.8)
MCHC RBC AUTO-ENTMCNC: 31.2 G/DL (ref 28.4–34.8)
MCV RBC AUTO: 87.5 FL (ref 82.6–102.9)
MCV RBC AUTO: 93.8 FL (ref 82.6–102.9)
METHEMOGLOBIN: ABNORMAL % (ref 0–1.5)
MODE: ABNORMAL
MONOCYTES # BLD: 5 % (ref 3–12)
MONOCYTES # BLD: 7 % (ref 3–12)
NEGATIVE BASE EXCESS, VEN: ABNORMAL MMOL/L (ref 0–2)
NOTIFICATION TIME: ABNORMAL
NOTIFICATION: ABNORMAL
NRBC AUTOMATED: 0 PER 100 WBC
NRBC AUTOMATED: 0 PER 100 WBC
O2 DEVICE/FLOW/%: ABNORMAL
O2 SAT, VEN: 97.8 % (ref 60–85)
OXYHEMOGLOBIN: ABNORMAL % (ref 95–98)
PATIENT TEMP: 37
PCO2, VEN, TEMP ADJ: ABNORMAL MMHG (ref 39–55)
PCO2, VEN: 36.1 (ref 39–55)
PDW BLD-RTO: 14.7 % (ref 11.8–14.4)
PDW BLD-RTO: 14.9 % (ref 11.8–14.4)
PEEP/CPAP: ABNORMAL
PH VENOUS: 7.43 (ref 7.32–7.42)
PH, VEN, TEMP ADJ: ABNORMAL (ref 7.32–7.42)
PHOSPHORUS: 3.7 MG/DL (ref 2.6–4.5)
PHOSPHORUS: 3.8 MG/DL (ref 2.6–4.5)
PLATELET # BLD: 303 K/UL (ref 138–453)
PLATELET # BLD: ABNORMAL K/UL (ref 138–453)
PLATELET ESTIMATE: ABNORMAL
PLATELET ESTIMATE: ABNORMAL
PLATELET, FLUORESCENCE: 305 K/UL (ref 138–453)
PLATELET, IMMATURE FRACTION: 4.5 % (ref 1.1–10.3)
PMV BLD AUTO: 9.7 FL (ref 8.1–13.5)
PMV BLD AUTO: ABNORMAL FL (ref 8.1–13.5)
PO2, VEN, TEMP ADJ: ABNORMAL MMHG (ref 30–50)
PO2, VEN: 97.8 (ref 30–50)
POSITIVE BASE EXCESS, VEN: 0.1 MMOL/L (ref 0–2)
POTASSIUM SERPL-SCNC: 5.4 MMOL/L (ref 3.7–5.3)
POTASSIUM SERPL-SCNC: 5.8 MMOL/L (ref 3.7–5.3)
PSV: ABNORMAL
PT. POSITION: ABNORMAL
RBC # BLD: 2.76 M/UL (ref 3.95–5.11)
RBC # BLD: 2.89 M/UL (ref 3.95–5.11)
RBC # BLD: ABNORMAL 10*6/UL
RBC # BLD: ABNORMAL 10*6/UL
RESPIRATORY RATE: ABNORMAL
SAMPLE SITE: ABNORMAL
SEG NEUTROPHILS: 74 % (ref 36–65)
SEG NEUTROPHILS: 77 % (ref 36–65)
SEGMENTED NEUTROPHILS ABSOLUTE COUNT: 6.21 K/UL (ref 1.5–8.1)
SEGMENTED NEUTROPHILS ABSOLUTE COUNT: 6.92 K/UL (ref 1.5–8.1)
SET RATE: ABNORMAL
SODIUM BLD-SCNC: 127 MMOL/L (ref 135–144)
SODIUM BLD-SCNC: 132 MMOL/L (ref 135–144)
TEXT FOR RESPIRATORY: ABNORMAL
TOTAL HB: ABNORMAL G/DL (ref 12–16)
TOTAL RATE: ABNORMAL
TROPONIN INTERP: ABNORMAL
TROPONIN INTERP: ABNORMAL
TROPONIN T: ABNORMAL NG/ML
TROPONIN T: ABNORMAL NG/ML
TROPONIN, HIGH SENSITIVITY: 19 NG/L (ref 0–14)
TROPONIN, HIGH SENSITIVITY: 19 NG/L (ref 0–14)
VT: ABNORMAL
WBC # BLD: 8.4 K/UL (ref 3.5–11.3)
WBC # BLD: 9 K/UL (ref 3.5–11.3)
WBC # BLD: ABNORMAL 10*3/UL
WBC # BLD: ABNORMAL 10*3/UL

## 2021-10-30 PROCEDURE — 36415 COLL VENOUS BLD VENIPUNCTURE: CPT

## 2021-10-30 PROCEDURE — 6360000002 HC RX W HCPCS

## 2021-10-30 PROCEDURE — 99232 SBSQ HOSP IP/OBS MODERATE 35: CPT | Performed by: INTERNAL MEDICINE

## 2021-10-30 PROCEDURE — 6370000000 HC RX 637 (ALT 250 FOR IP): Performed by: NURSE PRACTITIONER

## 2021-10-30 PROCEDURE — 6360000002 HC RX W HCPCS: Performed by: INTERNAL MEDICINE

## 2021-10-30 PROCEDURE — 93005 ELECTROCARDIOGRAM TRACING: CPT | Performed by: INTERNAL MEDICINE

## 2021-10-30 PROCEDURE — 85055 RETICULATED PLATELET ASSAY: CPT

## 2021-10-30 PROCEDURE — 84484 ASSAY OF TROPONIN QUANT: CPT

## 2021-10-30 PROCEDURE — 82947 ASSAY GLUCOSE BLOOD QUANT: CPT

## 2021-10-30 PROCEDURE — 6370000000 HC RX 637 (ALT 250 FOR IP)

## 2021-10-30 PROCEDURE — 1200000000 HC SEMI PRIVATE

## 2021-10-30 PROCEDURE — 2580000003 HC RX 258

## 2021-10-30 PROCEDURE — 80069 RENAL FUNCTION PANEL: CPT

## 2021-10-30 PROCEDURE — 85025 COMPLETE CBC W/AUTO DIFF WBC: CPT

## 2021-10-30 PROCEDURE — 6370000000 HC RX 637 (ALT 250 FOR IP): Performed by: INTERNAL MEDICINE

## 2021-10-30 PROCEDURE — 99233 SBSQ HOSP IP/OBS HIGH 50: CPT | Performed by: INTERNAL MEDICINE

## 2021-10-30 PROCEDURE — 82805 BLOOD GASES W/O2 SATURATION: CPT

## 2021-10-30 PROCEDURE — 71045 X-RAY EXAM CHEST 1 VIEW: CPT

## 2021-10-30 PROCEDURE — 86140 C-REACTIVE PROTEIN: CPT

## 2021-10-30 RX ORDER — FENTANYL CITRATE 50 UG/ML
25 INJECTION, SOLUTION INTRAMUSCULAR; INTRAVENOUS
Status: DISCONTINUED | OUTPATIENT
Start: 2021-10-30 | End: 2021-10-30

## 2021-10-30 RX ORDER — FUROSEMIDE 20 MG/1
20 TABLET ORAL DAILY
Status: DISCONTINUED | OUTPATIENT
Start: 2021-10-30 | End: 2021-10-31

## 2021-10-30 RX ORDER — FENTANYL CITRATE 50 UG/ML
25 INJECTION, SOLUTION INTRAMUSCULAR; INTRAVENOUS ONCE
Status: COMPLETED | OUTPATIENT
Start: 2021-10-30 | End: 2021-10-30

## 2021-10-30 RX ORDER — FENTANYL CITRATE 50 UG/ML
50 INJECTION, SOLUTION INTRAMUSCULAR; INTRAVENOUS
Status: DISCONTINUED | OUTPATIENT
Start: 2021-10-30 | End: 2021-10-31

## 2021-10-30 RX ADMIN — PANTOPRAZOLE SODIUM 40 MG: 40 TABLET, DELAYED RELEASE ORAL at 05:07

## 2021-10-30 RX ADMIN — OXYCODONE HYDROCHLORIDE AND ACETAMINOPHEN 2 TABLET: 5; 325 TABLET ORAL at 16:55

## 2021-10-30 RX ADMIN — AMLODIPINE BESYLATE 10 MG: 10 TABLET ORAL at 08:24

## 2021-10-30 RX ADMIN — SODIUM CHLORIDE, PRESERVATIVE FREE 10 ML: 5 INJECTION INTRAVENOUS at 08:25

## 2021-10-30 RX ADMIN — FUROSEMIDE 20 MG: 20 TABLET ORAL at 13:02

## 2021-10-30 RX ADMIN — INSULIN GLARGINE 35 UNITS: 100 INJECTION, SOLUTION SUBCUTANEOUS at 21:53

## 2021-10-30 RX ADMIN — OXYCODONE HYDROCHLORIDE AND ACETAMINOPHEN 2 TABLET: 5; 325 TABLET ORAL at 05:05

## 2021-10-30 RX ADMIN — CEFTRIAXONE SODIUM 2000 MG: 2 INJECTION, POWDER, FOR SOLUTION INTRAMUSCULAR; INTRAVENOUS at 08:25

## 2021-10-30 RX ADMIN — CARVEDILOL 3.12 MG: 3.12 TABLET, FILM COATED ORAL at 08:24

## 2021-10-30 RX ADMIN — FENTANYL CITRATE 25 MCG: 50 INJECTION, SOLUTION INTRAMUSCULAR; INTRAVENOUS at 17:07

## 2021-10-30 RX ADMIN — FENTANYL CITRATE 25 MCG: 50 INJECTION, SOLUTION INTRAMUSCULAR; INTRAVENOUS at 17:19

## 2021-10-30 RX ADMIN — INSULIN LISPRO 2 UNITS: 100 INJECTION, SOLUTION INTRAVENOUS; SUBCUTANEOUS at 08:24

## 2021-10-30 RX ADMIN — INSULIN LISPRO 4 UNITS: 100 INJECTION, SOLUTION INTRAVENOUS; SUBCUTANEOUS at 16:42

## 2021-10-30 RX ADMIN — INSULIN LISPRO 4 UNITS: 100 INJECTION, SOLUTION INTRAVENOUS; SUBCUTANEOUS at 12:00

## 2021-10-30 RX ADMIN — SODIUM CHLORIDE, PRESERVATIVE FREE 10 ML: 5 INJECTION INTRAVENOUS at 21:12

## 2021-10-30 RX ADMIN — ENOXAPARIN SODIUM 30 MG: 30 INJECTION SUBCUTANEOUS at 21:11

## 2021-10-30 RX ADMIN — CARVEDILOL 3.12 MG: 3.12 TABLET, FILM COATED ORAL at 16:46

## 2021-10-30 RX ADMIN — ENOXAPARIN SODIUM 30 MG: 30 INJECTION SUBCUTANEOUS at 08:24

## 2021-10-30 RX ADMIN — INSULIN LISPRO 2 UNITS: 100 INJECTION, SOLUTION INTRAVENOUS; SUBCUTANEOUS at 21:11

## 2021-10-30 ASSESSMENT — PAIN SCALES - GENERAL
PAINLEVEL_OUTOF10: 10
PAINLEVEL_OUTOF10: 8
PAINLEVEL_OUTOF10: 10
PAINLEVEL_OUTOF10: 7
PAINLEVEL_OUTOF10: 10

## 2021-10-30 ASSESSMENT — PAIN DESCRIPTION - ORIENTATION: ORIENTATION: LEFT

## 2021-10-30 ASSESSMENT — PAIN DESCRIPTION - PAIN TYPE: TYPE: ACUTE PAIN

## 2021-10-30 ASSESSMENT — PAIN DESCRIPTION - LOCATION: LOCATION: KNEE

## 2021-10-30 ASSESSMENT — PAIN DESCRIPTION - ONSET: ONSET: ON-GOING

## 2021-10-30 ASSESSMENT — PAIN DESCRIPTION - FREQUENCY: FREQUENCY: CONTINUOUS

## 2021-10-30 ASSESSMENT — PAIN DESCRIPTION - DESCRIPTORS: DESCRIPTORS: ACHING;DISCOMFORT

## 2021-10-30 ASSESSMENT — PAIN DESCRIPTION - PROGRESSION: CLINICAL_PROGRESSION: NOT CHANGED

## 2021-10-30 NOTE — PROGRESS NOTES
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 Stop date 11-20-21)  Long term oral antibiotic suppression following the above  Will need midline for IV antibiotics  Orthopedic surgery following-  S/p  explantation and antibiotic spacer device placement 10-26-21. Office f/up in 3 weeks with Dr Natalia Calvo for infection. Please call [phone] for appointment     Medical Decision Making/Summary/Discussion:10/30/2021       Infection Control Recommendations   [place] Precautions      Antimicrobial Stewardship Recommendations     Simplification of therapy    Coordination of Outpatient Care:   Estimated Length of IV antimicrobials:4 weeks  Patient will need Midline Catheter Insertion: Yes  Patient will need PICC line Insertion:No  Patient will need: Home IV , [place],  SNF,  LTAC: TBD  Patient will need outpatient wound care:Yes    Chief complaint/reason for consultation:   Lt knee pyogenic arthritis      History of Present Illness:   Ge Bartholomew is a 80y.o.-year-old female who was initially admitted on 10/21/2021. Patient seen at the request of Dr. Tashi Valdez. INITIAL HISTORY:    Patient with a Hx of prior Lt TKA  In 2007 at [facility] under Dr Kandi Vance, She subsequently experienced  Lt knee infections which required additional surgeries x 2. She has underlying DM 2, Essential HTN, CKD,PITA and COPD. [facility] onset of pain with ambulation. The knee joint was aspirated and grew Strep. Agalactiae.  She was started on antibiotics (ceftriaxone) and was awaiting evaluation at tertiary care center because of her Hx of prior surgeries and infections. [facility] refused transfer. She presented to [facility] because of ongoing severe pain, erythema and inflammation. Ortho has evaluated and plan intervention on 10-25-21. CURRENT EVALUATION : 10/30/2021    Afebrile  VS stable    Had pre-op evaluation by Cardiology including stress test.  Patient had positive stress test.   S/p left heart cath 10/26: normal coronary arteries, preserved LV systolic function    Orthopedic Surgery performed  Left knee I&D with fusion nail on 10/26/21 explant/spacer placement. Patient has been doing well post-op. She complained constipation. Received 1 unit pRBC on 10/28/21 for Hgb 6.8     Denies any fever, chills, nausea, vomiting. Patient had a large BM. She will need IV ceftriaxone x 4 weeks and then long term oral antibiotic suppression. Labs, X rays reviewed: 10/30/2021    BUN: 40-->22-->25-->29-->30  Cr: 1.57-->0.78-->0.98-->1.15-->1.08    WBC: 10.4-->7.8-->11.7-->8.2  Hb:8.9-->8.5-->7.5-->6.8-->7.2-->7.4   Plat: 207-->278-->269-->278-->263    Cultures:  Urine:    Blood:  10/21/21: no growth  Sputum :    Wound:  Knee aspirate: Strep agalactiae      COVID rapid 10/22/21: negative    Discussed with patient, RN, IM. I have personally reviewed the past medical history, past surgical history, medications, social history, and family history, and I have updated the database accordingly.   Past Medical History:     Past Medical History:   Diagnosis Date    Arthritis     Diabetes mellitus ([address]     Hyperlipidemia     Hypertension        Past Surgical  History:     Past Surgical History:   Procedure Laterality Date    HYSTERECTOMY      KNEE SURGERY Left 10/26/2021    KNEE EXPLANT, IRRIGATION & DEBRIDEMENT, STATIC ANTIBIOTIC SPACER, LINK ORTHO FUSION NAIL SET    REVISION TOTAL KNEE ARTHROPLASTY Left 10/26/2021    KNEE EXPLANT, IRRIGATION & DEBRIDEMENT, STATIC ANTIBIOTIC SPACER, LINK ORTHO FUSION NAIL SET- performed by Bharathi Perez DO at [facility] OR       Medications:      furosemide  20 mg Oral Daily    metFORMIN  1,000 mg Oral BID WC    polyethylene glycol  17 g Oral Daily    insulin glargine  35 Units SubCUTAneous Nightly    enoxaparin  30 mg SubCUTAneous BID    amLODIPine  10 mg Oral Daily    carvedilol  3.125 mg Oral BID WC    sodium chloride flush  5-40 mL IntraVENous 2 times per day    sodium chloride flush  5-40 mL IntraVENous 2 times per day    [Held by provider] losartan  100 mg Oral Daily    pantoprazole  40 mg Oral QAM AC    insulin lispro  0-12 Units SubCUTAneous TID WC    cefTRIAXone (ROCEPHIN) IV  2,000 mg IntraVENous Q24H    insulin lispro  0-6 Units SubCUTAneous Nightly       Social History:     Social History     Socioeconomic History    Marital status:      Spouse name: Not on file    Number of children: Not on file    Years of education: Not on file    Highest education level: Not on file   Occupational History    Not on file   Tobacco Use    Smoking status: Never Smoker   Substance and Sexual Activity    Alcohol use: No    Drug use: No    Sexual activity: Not on file   Other Topics Concern    Not on file   Social History Narrative    Not on file     Social Determinants of Health     Financial Resource Strain:     Difficulty of Paying Living Expenses:    Food Insecurity:     Worried About Running Out of Food in the Last Year:     [address] in the Last Year:    Transportation Needs:     Lack of Transportation (Medical):      Lack of Transportation (Non-Medical):    Physical Activity:     Days of Exercise per Week:     Minutes of Exercise per Session:    Stress:     Feeling of Stress :    Social Connections:     Frequency of Communication with Friends and Family:     Frequency of Social Gatherings with Friends and Family:     Attends Synagogue Services:     Active Member of [address] [place] [address] or Organizations:     Attends Club or Organization Meetings:     Marital Status:    Intimate Partner Violence:     Fear of Current or Ex-Partner:     Emotionally Abused:     Physically Abused:     Sexually Abused:        Family History:   No family history on file. Allergies:   Bactrim [sulfamethoxazole-trimethoprim], Codeine, and Lisinopril     Review of Systems:   Constitutional: No fevers or chills. No systemic complaints  Head: No headaches  Eyes: No double vision or blurry vision. No conjunctival inflammation. ENT: No sore throat or runny nose. . No hearing loss, tinnitus or vertigo. Cardiovascular: No chest pain or palpitations. No shortness of breath. No FRANCES  Lung: No shortness of breath or cough. No sputum production  Abdomen: No nausea, vomiting, diarrhea, or abdominal pain. Cloteal Larios No cramps. Genitourinary: No increased urinary frequency, or dysuria. No hematuria. No suprapubic or CVA pain  Musculoskeletal: No muscle aches or pains. No joint effusions, swelling or deformities. Lt knee effusion  Hematologic: No bleeding or bruising. Neurologic: No headache, weakness, numbness, or tingling. Integument: No rash, no ulcers. Lt knee erythema  Psychiatric: No depression. Endocrine: No polyuria, no polydipsia, no polyphagia. Physical Examination :     Patient Vitals for the past 8 hrs:   BP Temp Temp src Pulse Resp SpO2   10/30/21 0716 (!) 144/57 98.7 °F (37.1 °C) Oral 80 16 94 %   10/30/21 0500 (!) 140/63 98.1 °F (36.7 °C) Oral 83 18 97 %     General Appearance: Awake, alert, and in no apparent distress  Head:  Normocephalic, no trauma  Eyes: Pupils equal, round, reactive to light and accommodation; extraocular movements intact; sclera anicteric; conjunctivae pink. No embolic phenomena. ENT: Oropharynx clear, without erythema, exudate, or thrush. No tenderness of sinuses. Mouth/throat: mucosa pink and moist. No lesions. Dentition in good repair. Neck:Supple, without lymphadenopathy.  Thyroid normal, No bruits. Pulmonary/Chest: Clear to auscultation, without wheezes, rales, or rhonchi. No dullness to percussion. Cardiovascular: Regular rate and rhythm without murmurs, rubs, or gallops. Abdomen: Soft, non tender. Bowel sounds normal. No organomegaly  All four Extremities: No cyanosis, clubbing, edema. Lt knee effusion and erythema. Neurologic: No gross sensory or motor deficits. Skin: Warm and dry with good turgor. No signs of peripheral arterial or venous insufficiency. No ulcerations. No open wounds. Medical Decision Making -Laboratory:   I have independently reviewed/ordered the following labs:    CBC with Differential:   Recent Labs     10/28/21  0636 10/28/21  0636 10/28/21  1822 10/29/21  1516   WBC 11.7*  --   --  8.2   HGB 6.8*   < > 7.2* 7.4*   HCT 23.1*   < > 23.5* 25.3*     --   --  263   LYMPHOPCT 15*  --   --  16*   MONOPCT 7  --   --  6    < > = values in this interval not displayed. BMP:   Recent Labs     10/28/21  0636 10/29/21  1516   * 130*   K 5.1 5.2   CL 99 99   CO2 20 20   BUN 29* 30*   CREATININE 1.15* 1.08*   MG  --  2.0     Hepatic Function Panel:   Recent Labs     10/29/21  1516   LABALBU 2.1*     No results for input(s): RPR in the last 72 hours. No results for input(s): HIV in the last 72 hours. No results for input(s): BC in the last 72 hours. Lab Results   Component Value Date    MUCUS NOT REPORTED 10/22/2021    RBC 2.75 10/29/2021    TRICHOMONAS NOT REPORTED 10/22/2021    WBC 8.2 10/29/2021    YEAST NOT REPORTED 10/22/2021    TURBIDITY Clear 10/22/2021     Lab Results   Component Value Date    CREATININE 1.08 10/29/2021    GLUCOSE 168 10/29/2021       Medical Decision Making-Imaging:     EXAMINATION:   2 X-RAY VIEWS OF THE LEFT TIBIA AND FIBULA; 2 X-RAY VIEWS OF THE LEFT FEMUR.    RULER WAS USED.       10/22/2021 9:46 am       COMPARISON:   Left knee radiographs dated 10/21/2021       HISTORY:   ORDERING SYSTEM PROVIDED HISTORY: leg length eval TECHNOLOGIST PROVIDED HISTORY:   leg length eval   Reason for Exam: leg length eval; Best possible images at this time due to pt   condition . -JEK/GW       FINDINGS:   Left femur: Left femoral head is not visualized due to overlying soft tissue   attenuation.  The superior tip of the greater trochanter is used as a   landmark.  Femoral length from the superior tip of the greater trochanter to   the medial femoral condyle is approximately 39.2 cm.       Left total knee arthroplasty is in place.  No acute fracture of the left   femur.  No evidence of hardware loosening.       Left leg: Distance from the medial femoral condyle to the mid tibial plafond   is 36 cm, which includes the polyethylene liner in the knee arthroplasty. (Total leg length of approximately 75.2 cm from superior tip of greater   trochanter to mid tibial plafond).  The liner measures up to 2 cm in   thickness.       No acute fracture involving the tibia or fibula.  No periprosthetic lucency   in the proximal tibia.  Ankle mortise alignment is preserved.  Scattered   dystrophic calcifications in the leg.           Impression   1.  No acute osseous abnormality in the left femur or tibia/fibula.       2.  Left knee arthroplasty in place without evidence for hardware loosening.       3.  Leg length measurements provided above.  Please note limitations and   landmarks used.         CT extremity lower left with contrast    Result Date: 10/18/2021  History: Acute left lower leg and ankle redness Exam/Technique: Thin axial images through the left lower extremity were obtained from the superior aspect of the acetabulum to the left foot following the intravenous demonstration of 100 mL Omnipaque 300. Study was supplemented by sagittal and coronal reconstructed images. Comparison: None Findings: There is a left knee arthroplasty in place. Full evaluation of the area of the knee is compromised by extensive metallic artifact.  There is a joint effusion of the left knee with fluid seen in the supra patellar bursa. There is no evidence for an acute osseous abnormality. No lytic or blastic processes are seen. No evidence of osteolysis demonstrated. No soft tissue masses or fluid collections are identified. IMPRESSION: 1. Left knee joint effusion with a left knee arthroplasty in place. 2. Otherwise normal CT of the left lower extremity. Workstation:[id number] Finalized by Yoav Mercado MD on 10/18/2021     Medical Decision Alhnla-Deqlcicj-Knlmy:   Microbiology Results   Procedure Component Value Units Date/Time   Body fluid culture includes gram stain [[id number]] (Abnormal) Collected: 10/18/21 1210   Specimen: Fluid Updated: 10/19/21 1252   Gram Stain Result WHITE BLOOD CELLS PRESENT   FEW GRAM POSITIVE COCCI   QUANTITY NOT SUFFICIENT TO CONCENTRATE INTERPRET RESULTS WITH CAUTION   A Negative report does not exclude the possibility of infection because results are dependent on adequate specimen collection. Culture RARE STREPTOCOCCUS AGALACTIAE (GROUP B)   SARS COV 2 (COVID-19) [facility] Onsite Test [[id number]] Collected: 10/18/21 0253   Specimen: Nasopharynx Updated: 10/18/21 0335   Specimen Naso Pharynx   Sent to Testing to be performed at [facility]. COVID-19 [facility] Report to Follow. SARS CoV 2 [[id number]] Collected: 10/18/21 0253   Specimen: Nasopharynx Updated: 10/18/21 0336   First Test? UNKNOWN   Employed in Healthcare? UNKNOWN   Symptoms Defined by CDC? NO   Symptom Onset? U^UNKNOWN   Hospitalized for Covid? UNKNOWN   ICU for Covid? UNKNOWN   Congregate Setting? UNKNOWN   Pregnant?  NO   Specimen Type Naso Pharynx   SARS COV 2 Presumptive Negative     Medical Decision Making-Other:     Note:  Labs, medications, radiologic studies were reviewed with personal review of films  Large amounts of data were reviewed  Discussed with nursing Staff, Discharge planner  Infection Control and Prevention measures reviewed  All prior entries were reviewed  Administer medications as ordered  Prognosis: Guarded  Discharge planning reviewed  Follow up as outpatient. Thank you for allowing us to participate in the care of this patient. Please call with questions. Maryann Villalpando MD, PGY-1  Infectious Disease/ Internal Medicine Resident  [place], [place]      ATTESTATION:    I have discussed the case, including pertinent history and exam findings with the residents and students. I have seen and examined the patient and the key elements of the encounter have been performed by me. I was present when the student obtained his information or examined the patient. I have reviewed the laboratory data, other diagnostic studies and discussed them with the residents. I have updated the medical record where necessary. I agree with the assessment, plan and orders as documented by the resident/ student.     Aidan Rhodes MD.    Pager: [phone] - Office: [phone]

## 2021-10-30 NOTE — PROGRESS NOTES
[facility]  Office: [address], DO, Mercedez Belcher, DO, To[facility], DO, Dave Moranmond Blood, DO, Mary Peoples MD, Timmy Hurtado MD, Caleb Ramos MD, Yahaira Bansal MD, Cari De León MD, Ambrocio Momin MD, Rc Babin MD, Anny Stone MD, Oseas Patel, DO, Teresa Sanderson, DO, Jenifer Rodriguez MD,  Yousuf Paulino DO, Juju Adam MD, Lanie Lopez MD, Linda Bautista MD, Magdaleno Barbosa MD, Jt Davidson MD, Tk Jordan MD, Sabi Jiménez, [facility], [facility], [facility], Germán Goldman, CNP, Antonio Sam, [facility], Titi Mccollum, CNP, Cullen Lan, CNP, Adrian Raya, CNP, Baron Lesch, CNP, Eddie Harden, [facility], Vania White, CNP, Anne Heath PA-C, Denver Parks, [facility], Gunnar Mckinney, CNP, Darren Butcher, CNP, Lindsey Urias, CNP, Jesus Brown, CNP, Tk Cleary, CNP, Ysabel Valdez, CNP, Corey Brown, [address] [place] [address]    Progress Note    10/30/2021    12:07 PM    Name:   Grant Elliott  MRN:     [id number]     Acct:      [de-identified]   Room:   [address] [place] [address] Day:  9  Admit Date:  10/21/2021 10:51 PM    PCP:   Alex Zelaya  Code Status:  Full Code    Subjective:     C/C: arthritis  Interval History Status: not changed. Patient seen and examined bedside. Patient had a large bowel movement. Otherwise she is doing well. Patient vitals have been stable. Brief History:     80 y.o. female being seen for a left knee periprosthetic joint infection. Patient overall feeling well aside from pain in left knee. Plan for OR today for explantation and antibiotic spacer.     Review of Systems:     Constitutional:  negative for chills, fevers, sweats  Respiratory:  negative for cough, dyspnea on exertion, shortness of breath, wheezing  Cardiovascular:  negative for chest pain, chest pressure/discomfort, lower extremity edema, palpitations  Gastrointestinal:  negative for abdominal pain, constipation, diarrhea, nausea, vomiting  Neurological:  negative for dizziness, headache has some light headedness. EXT: knee pain is improving, admits to decreased range of motion but doing well. Medications: Allergies: Allergies   Allergen Reactions    Bactrim [Sulfamethoxazole-Trimethoprim]     Codeine     Lisinopril Other (See Comments)     cough       Current Meds:   Scheduled Meds:    furosemide  20 mg Oral Daily    metFORMIN  1,000 mg Oral BID WC    polyethylene glycol  17 g Oral Daily    insulin glargine  35 Units SubCUTAneous Nightly    enoxaparin  30 mg SubCUTAneous BID    amLODIPine  10 mg Oral Daily    carvedilol  3.125 mg Oral BID WC    sodium chloride flush  5-40 mL IntraVENous 2 times per day    sodium chloride flush  5-40 mL IntraVENous 2 times per day    [Held by provider] losartan  100 mg Oral Daily    pantoprazole  40 mg Oral QAM AC    insulin lispro  0-12 Units SubCUTAneous TID WC    cefTRIAXone (ROCEPHIN) IV  2,000 mg IntraVENous Q24H    insulin lispro  0-6 Units SubCUTAneous Nightly     Continuous Infusions:    dextrose       PRN Meds: oxyCODONE-acetaminophen, acetaminophen, glucose, dextrose, glucagon (rDNA), dextrose, ondansetron **OR** ondansetron, acetaminophen **OR** acetaminophen    Data:     Past Medical History:   has a past medical history of Arthritis, Diabetes mellitus ([address] Hyperlipidemia, and Hypertension. Social History:   reports that she has never smoked. She does not have any smokeless tobacco history on file. She reports that she does not drink alcohol and does not use drugs. Family History: No family history on file.     Vitals:  BP (!) 144/57   Pulse 80   Temp 98.7 °F (37.1 °C) (Oral)   Resp 16   Ht 5' 4\" (1.626 m)   Wt (!) 304 lb (137.9 kg)   SpO2 94%   BMI 52.18 kg/m²   Temp (24hrs), Av.4 °F (37.4 °C), Min:98.1 °F (36.7 °C), Max:101.6 °F (38.7 °C)    Recent Labs     10/29/21  2005 10/30/21  0644 10/30/21  0715 10/30/21  1117   POCGLU 149* 153* 198* 206* I/O (24Hr): Intake/Output Summary (Last 24 hours) at 10/30/2021 1207  Last data filed at 10/30/2021 0400  Gross per 24 hour   Intake --   Output 750 ml   Net -750 ml       Labs:  Hematology:  Recent Labs     10/28/21  0636 10/28/21  1822 10/29/21  1516 10/30/21  0301   WBC 11.7*  --  8.2  --    RBC 2.54*  --  2.75*  --    HGB 6.8* 7.2* 7.4*  --    HCT 23.1* 23.5* 25.3*  --    MCV 90.9  --  92.0  --    MCH 26.8  --  26.9  --    MCHC 29.4  --  29.2  --    RDW 14.8*  --  14.7*  --      --  263  --    MPV 10.2  --  10.0  --    .1*  --   --  169.1*     Chemistry:  Recent Labs     10/28/21  0636 10/29/21  1516   * 130*   K 5.1 5.2   CL 99 99   CO2 20 20   GLUCOSE 142* 168*   BUN 29* 30*   CREATININE 1.15* 1.08*   MG  --  2.0   ANIONGAP 13 11   LABGLOM 47* 50*   GFRAA 57* >60   CALCIUM 8.2* 7.9*   PHOS  --  4.5     Recent Labs     10/29/21  1131 10/29/21  1516 10/29/21  1550 10/29/21  2005 10/30/21  0644 10/30/21  0715 10/30/21  1117   LABALBU  --  2.1*  --   --   --   --   --    POCGLU 151*  --  160* 149* 153* 198* 206*     ABG:No results found for: POCPH, PHART, PH, POCPCO2, BBZ7LWP, PCO2, POCPO2, PO2ART, PO2, POCHCO3, LMK4EIO, HCO3, NBEA, PBEA, BEART, BE, THGBART, THB, SGU6TFG, LART8HFH, X4JJBMMM, O2SAT, FIO2  Lab Results   Component Value Date/Time    SPECIAL RT 4ML 10/21/2021 11:00 PM     Lab Results   Component Value Date/Time    CULTURE NO GROWTH 6 DAYS 10/21/2021 11:00 PM       Radiology:  XR FEMUR LEFT (MIN 2 VIEWS)    Result Date: 10/22/2021  1. No acute osseous abnormality in the left femur or tibia/fibula. 2.  Left knee arthroplasty in place without evidence for hardware loosening. 3.  Leg length measurements provided above. Please note limitations and landmarks used. XR KNEE LEFT (3 VIEWS)    Result Date: 10/22/2021  1. Soft tissue edema and effusion. 2. No acute osseous abnormality. XR TIBIA FIBULA LEFT (2 VIEWS)    Result Date: 10/22/2021  1.   No acute osseous 1.19 for exercise and greater than 1.39 for regadenoson) Intermediate risk (1% to 3% annual death or MI) 1. Mild/moderate resting LV dysfunction (LVEF 35% to 49%) not readily explained by non coronary causes. 2. Resting perfusion abnormalities in 5%-9.9% of the myocardium in patients without a history or prior evidence of MI 3. Stress-induced perfusion abnormality encumbering 5%-9.9% of the myocardium or stress segmental scores indicating 1 vascular territory with abnormalities but without LV dilation 4. Small wall motion abnormality involving 1-2 segments and only 1 coronary bed. Low Risk (Less than 1% annual death or MI) 1. Normal or small myocardial perfusion defect at rest or with stress encumbering less than 5% of the myocardium. Physical Examination:        General appearance:  alert, cooperative and no distress  Mental Status:  oriented to person, place and time and normal affect  Lungs:  clear to auscultation bilaterally, normal effort  Heart:  regular rate and rhythm, no murmur  Abdomen:  soft, nontender, nondistended, normal bowel sounds, no masses, hepatomegaly, splenomegaly  Extremities:  no edema, redness, tenderness in the calves  Skin:  no gross lesions, rashes, induration. Knee incision c/d/i, drain in place.      Assessment:        Hospital Problems         Last Modified POA    * (Principal) Staphylococcal arthritis of left knee ([address] 10/22/2021 Yes    Type 2 diabetes mellitus without complication, without long-term current use of insulin ([address] 10/22/2021 Yes    PITA on CPAP 10/22/2021 Yes    CAD (coronary artery disease) 10/22/2021 Yes    COPD (chronic obstructive pulmonary disease) ([address] 10/22/2021 Yes    Primary hypertension 10/22/2021 Yes    Acute kidney injury superimposed on CKD ([address] 10/22/2021 Yes    Normocytic normochromic anemia 10/22/2021 Yes          Plan:        Left knee periprosthetic joint infection: S/p knee explant, Irrigation/debridement, with placement of static antibiotic spacer and ortho fusion nail on 10/27. Continue Rocephin 2 gm IV every 24 hours  For one month followed by long term oral supression therapy. Will consult IV team for midline. Needs follow up with Dr. Rosana Ryan in 3 weeks. Blood cultures NGDT . Post operative anemia: Received one unit of PRBC on 10/28 for anemia, tachycardia and hgb of 6.8. Tsat: 9, Ferritin 372. Pt will need Iron replacement after infection clears. Constipation: Start bowel regimen. Add Mag citrate today. Acute Kidney injury on Chronic Kidney disease stage 1: Resolved. Scr back to baseline  Non anion gap metabolic acidosis: stop normal saline  Mild Hyperkalemia: Monitor potassium. Hyponatremia: 2/2 osmotic drag from hyperglycemia with component of SIADH due to pain/sepsis. Urine sodium 63, urine osm 403  Normocytic normochromic anemia: Blood loss anemia+/-functional NOY and anemia of inflammation. Tsat 9%. Iron therapy once acute infection resolves. Monitor Hgb, transfuse prn for Hgb <7. Diabetes mellitus type II with Hyperglycemia: Continue Lantus 35 units QHS, ISS  Morbid Obesity BMI of 51: recommend weight loss and lifestyle modification  Uncontrolled Hypertension-improving: continue home antihypertensive regimen. Continue Coreg 3.125, Norvasc, hold Losartan given increasing creatinine . Suspected PITA: needs outpt sleep study  DVT ppx  PT/OT    Dispo: Waiting on SNF placement. Update: Called by nursing 1700 patient complaining of chest pain, worsening shortness of breath as well as worsening pain in her knee. Blood pressure 175/63, HR:85 SPO2:95% on 2L . Will check ECG, troponin, stat CXR, VBG.      ECG: shows NSR no ST/T wave changes.        Nita Monterroso DO  10/30/2021  12:07 PM

## 2021-10-30 NOTE — PROGRESS NOTES
1655-Patient not feeling well stating that she is short of breath with chest pain. /63. HR 85. O2 91% on 3L NC. Notified Dr. Perez Call via [employer]. 1705-Dr. Devlin to bedside to assess patient. Order's received. EKG complete. Will continue to monitor.

## 2021-10-30 NOTE — PLAN OF CARE
Problem: Pain:  Goal: Pain level will decrease  Description: Pain level will decrease  10/30/2021 0271 by Margaret Johnson RN  Outcome: Ongoing     Problem: Pain:  Goal: Control of acute pain  Description: Control of acute pain  10/30/2021 0613 by Margaret Johnson RN  Outcome: Ongoing     Problem: Pain:  Goal: Control of chronic pain  Description: Control of chronic pain  10/30/2021 0613 by Margaret Johnson RN  Outcome: Ongoing     Problem: Falls - Risk of:  Goal: Will remain free from falls  Description: Will remain free from falls  10/30/2021 0613 by Margaret Johnson RN  Outcome: Ongoing     Problem: Falls - Risk of:  Goal: Absence of physical injury  Description: Absence of physical injury  10/30/2021 6034 by Margaret Johnson RN  Outcome: Ongoing     Problem: Skin Integrity:  Goal: Will show no infection signs and symptoms  Description: Will show no infection signs and symptoms  10/30/2021 0613 by Margaret Johnson RN  Outcome: Ongoing     Problem: Skin Integrity:  Goal: Absence of new skin breakdown  Description: Absence of new skin breakdown  10/30/2021 0613 by Margaret Johnson RN  Outcome: Ongoing     Problem: Musculor/Skeletal Functional Status  Goal: Highest potential functional level  10/30/2021 0613 by Margaret Johnson RN  Outcome: Ongoing     Problem: Nutrition  Goal: Optimal nutrition therapy  10/30/2021 7013 by Margaret Johnson RN  Outcome: Ongoing

## 2021-10-30 NOTE — PLAN OF CARE
Problem: Falls - Risk of:  Goal: Will remain free from falls  Description: Will remain free from falls  10/30/2021 1455 by Bee Handy RN  Outcome: Met This Shift  10/30/2021 0613 by Niall Taveras RN  Outcome: Ongoing  Goal: Absence of physical injury  Description: Absence of physical injury  10/30/2021 1455 by Bee Handy RN  Outcome: Met This Shift  10/30/2021 0613 by Niall Taveras RN  Outcome: Ongoing     Problem: Pain:  Goal: Pain level will decrease  Description: Pain level will decrease  10/30/2021 1455 by Bee Handy RN  Outcome: Ongoing  10/30/2021 0613 by Niall Taveras RN  Outcome: Ongoing  Goal: Control of acute pain  Description: Control of acute pain  10/30/2021 1455 by Bee Handy RN  Outcome: Ongoing  10/30/2021 0613 by Niall Taveras RN  Outcome: Ongoing  Goal: Control of chronic pain  Description: Control of chronic pain  10/30/2021 1455 by Bee Handy RN  Outcome: Ongoing  10/30/2021 0613 by Niall Taveras RN  Outcome: Ongoing     Problem: Skin Integrity:  Goal: Will show no infection signs and symptoms  Description: Will show no infection signs and symptoms  10/30/2021 1455 by Bee Handy RN  Outcome: Ongoing  10/30/2021 0613 by Niall Taveras RN  Outcome: Ongoing  Goal: Absence of new skin breakdown  Description: Absence of new skin breakdown  10/30/2021 1455 by Bee Handy RN  Outcome: Ongoing  10/30/2021 0613 by Niall Taveras RN  Outcome: Ongoing     Problem: Musculor/Skeletal Functional Status  Goal: Highest potential functional level  10/30/2021 1455 by Bee Handy RN  Outcome: Ongoing  10/30/2021 0613 by Niall Taveras RN  Outcome: Ongoing     Problem: Nutrition  Goal: Optimal nutrition therapy  10/30/2021 1455 by Bee Handy RN  Outcome: Ongoing  10/30/2021 0613 by Niall Taveras RN  Outcome: Ongoing 6

## 2021-10-31 LAB
ABSOLUTE EOS #: 0.17 K/UL (ref 0–0.44)
ABSOLUTE IMMATURE GRANULOCYTE: 0.13 K/UL (ref 0–0.3)
ABSOLUTE LYMPH #: 1.48 K/UL (ref 1.1–3.7)
ABSOLUTE MONO #: 0.56 K/UL (ref 0.1–1.2)
ALBUMIN SERPL-MCNC: 2.2 G/DL (ref 3.5–5.2)
ALBUMIN/GLOBULIN RATIO: 0.4 (ref 1–2.5)
ALP BLD-CCNC: 297 U/L (ref 35–104)
ALT SERPL-CCNC: 18 U/L (ref 5–33)
ANION GAP SERPL CALCULATED.3IONS-SCNC: 13 MMOL/L (ref 9–17)
AST SERPL-CCNC: 27 U/L
BASOPHILS # BLD: 1 % (ref 0–2)
BASOPHILS ABSOLUTE: 0.07 K/UL (ref 0–0.2)
BILIRUB SERPL-MCNC: 0.17 MG/DL (ref 0.3–1.2)
BUN BLDV-MCNC: 30 MG/DL (ref 8–23)
BUN/CREAT BLD: ABNORMAL (ref 9–20)
CALCIUM SERPL-MCNC: 8.3 MG/DL (ref 8.6–10.4)
CHLORIDE BLD-SCNC: 96 MMOL/L (ref 98–107)
CO2: 21 MMOL/L (ref 20–31)
CREAT SERPL-MCNC: 1.13 MG/DL (ref 0.5–0.9)
DIFFERENTIAL TYPE: ABNORMAL
EOSINOPHILS RELATIVE PERCENT: 2 % (ref 1–4)
GFR AFRICAN AMERICAN: 58 ML/MIN
GFR NON-AFRICAN AMERICAN: 48 ML/MIN
GFR SERPL CREATININE-BSD FRML MDRD: ABNORMAL ML/MIN/{1.73_M2}
GFR SERPL CREATININE-BSD FRML MDRD: ABNORMAL ML/MIN/{1.73_M2}
GLUCOSE BLD-MCNC: 126 MG/DL (ref 65–105)
GLUCOSE BLD-MCNC: 153 MG/DL (ref 65–105)
GLUCOSE BLD-MCNC: 153 MG/DL (ref 70–99)
GLUCOSE BLD-MCNC: 163 MG/DL (ref 65–105)
GLUCOSE BLD-MCNC: 203 MG/DL (ref 65–105)
HCT VFR BLD CALC: 26.8 % (ref 36.3–47.1)
HEMOGLOBIN: 7.8 G/DL (ref 11.9–15.1)
IMMATURE GRANULOCYTES: 2 %
LYMPHOCYTES # BLD: 19 % (ref 24–43)
MCH RBC QN AUTO: 27.1 PG (ref 25.2–33.5)
MCHC RBC AUTO-ENTMCNC: 29.1 G/DL (ref 28.4–34.8)
MCV RBC AUTO: 93.1 FL (ref 82.6–102.9)
MONOCYTES # BLD: 7 % (ref 3–12)
NRBC AUTOMATED: 0 PER 100 WBC
PDW BLD-RTO: 14.8 % (ref 11.8–14.4)
PLATELET # BLD: 337 K/UL (ref 138–453)
PLATELET ESTIMATE: ABNORMAL
PMV BLD AUTO: 9.7 FL (ref 8.1–13.5)
POTASSIUM SERPL-SCNC: 5.4 MMOL/L (ref 3.7–5.3)
RBC # BLD: 2.88 M/UL (ref 3.95–5.11)
RBC # BLD: ABNORMAL 10*6/UL
SEG NEUTROPHILS: 69 % (ref 36–65)
SEGMENTED NEUTROPHILS ABSOLUTE COUNT: 5.4 K/UL (ref 1.5–8.1)
SODIUM BLD-SCNC: 130 MMOL/L (ref 135–144)
TOTAL PROTEIN: 7.1 G/DL (ref 6.4–8.3)
TROPONIN INTERP: ABNORMAL
TROPONIN INTERP: ABNORMAL
TROPONIN T: ABNORMAL NG/ML
TROPONIN T: ABNORMAL NG/ML
TROPONIN, HIGH SENSITIVITY: 21 NG/L (ref 0–14)
TROPONIN, HIGH SENSITIVITY: 22 NG/L (ref 0–14)
WBC # BLD: 7.8 K/UL (ref 3.5–11.3)
WBC # BLD: ABNORMAL 10*3/UL

## 2021-10-31 PROCEDURE — 6370000000 HC RX 637 (ALT 250 FOR IP): Performed by: NURSE PRACTITIONER

## 2021-10-31 PROCEDURE — 6360000002 HC RX W HCPCS: Performed by: INTERNAL MEDICINE

## 2021-10-31 PROCEDURE — 85025 COMPLETE CBC W/AUTO DIFF WBC: CPT

## 2021-10-31 PROCEDURE — 99232 SBSQ HOSP IP/OBS MODERATE 35: CPT | Performed by: INTERNAL MEDICINE

## 2021-10-31 PROCEDURE — 97535 SELF CARE MNGMENT TRAINING: CPT

## 2021-10-31 PROCEDURE — 97530 THERAPEUTIC ACTIVITIES: CPT

## 2021-10-31 PROCEDURE — 82947 ASSAY GLUCOSE BLOOD QUANT: CPT

## 2021-10-31 PROCEDURE — 94761 N-INVAS EAR/PLS OXIMETRY MLT: CPT

## 2021-10-31 PROCEDURE — 6370000000 HC RX 637 (ALT 250 FOR IP)

## 2021-10-31 PROCEDURE — 6370000000 HC RX 637 (ALT 250 FOR IP): Performed by: INTERNAL MEDICINE

## 2021-10-31 PROCEDURE — 36415 COLL VENOUS BLD VENIPUNCTURE: CPT

## 2021-10-31 PROCEDURE — 2580000003 HC RX 258

## 2021-10-31 PROCEDURE — 1200000000 HC SEMI PRIVATE

## 2021-10-31 PROCEDURE — 97110 THERAPEUTIC EXERCISES: CPT

## 2021-10-31 PROCEDURE — 2700000000 HC OXYGEN THERAPY PER DAY

## 2021-10-31 PROCEDURE — 80053 COMPREHEN METABOLIC PANEL: CPT

## 2021-10-31 PROCEDURE — 84484 ASSAY OF TROPONIN QUANT: CPT

## 2021-10-31 PROCEDURE — 6360000002 HC RX W HCPCS

## 2021-10-31 RX ORDER — FUROSEMIDE 40 MG/1
40 TABLET ORAL DAILY
Status: DISCONTINUED | OUTPATIENT
Start: 2021-10-31 | End: 2021-11-04 | Stop reason: HOSPADM

## 2021-10-31 RX ADMIN — INSULIN LISPRO 4 UNITS: 100 INJECTION, SOLUTION INTRAVENOUS; SUBCUTANEOUS at 11:42

## 2021-10-31 RX ADMIN — POLYETHYLENE GLYCOL 3350 17 G: 17 POWDER, FOR SOLUTION ORAL at 08:19

## 2021-10-31 RX ADMIN — OXYCODONE HYDROCHLORIDE AND ACETAMINOPHEN 1 TABLET: 5; 325 TABLET ORAL at 08:18

## 2021-10-31 RX ADMIN — SODIUM CHLORIDE, PRESERVATIVE FREE 10 ML: 5 INJECTION INTRAVENOUS at 20:54

## 2021-10-31 RX ADMIN — PANTOPRAZOLE SODIUM 40 MG: 40 TABLET, DELAYED RELEASE ORAL at 06:15

## 2021-10-31 RX ADMIN — SODIUM CHLORIDE, PRESERVATIVE FREE 10 ML: 5 INJECTION INTRAVENOUS at 20:47

## 2021-10-31 RX ADMIN — CARVEDILOL 3.12 MG: 3.12 TABLET, FILM COATED ORAL at 17:04

## 2021-10-31 RX ADMIN — SODIUM CHLORIDE, PRESERVATIVE FREE 10 ML: 5 INJECTION INTRAVENOUS at 08:19

## 2021-10-31 RX ADMIN — METFORMIN HYDROCHLORIDE 1000 MG: 500 TABLET ORAL at 17:00

## 2021-10-31 RX ADMIN — INSULIN LISPRO 1 UNITS: 100 INJECTION, SOLUTION INTRAVENOUS; SUBCUTANEOUS at 20:48

## 2021-10-31 RX ADMIN — FUROSEMIDE 40 MG: 40 TABLET ORAL at 17:00

## 2021-10-31 RX ADMIN — ENOXAPARIN SODIUM 30 MG: 30 INJECTION SUBCUTANEOUS at 20:48

## 2021-10-31 RX ADMIN — FUROSEMIDE 20 MG: 20 TABLET ORAL at 08:18

## 2021-10-31 RX ADMIN — OXYCODONE HYDROCHLORIDE AND ACETAMINOPHEN 2 TABLET: 5; 325 TABLET ORAL at 12:20

## 2021-10-31 RX ADMIN — SODIUM ZIRCONIUM CYCLOSILICATE 10 G: 10 POWDER, FOR SUSPENSION ORAL at 19:34

## 2021-10-31 RX ADMIN — ENOXAPARIN SODIUM 30 MG: 30 INJECTION SUBCUTANEOUS at 08:18

## 2021-10-31 RX ADMIN — INSULIN GLARGINE 35 UNITS: 100 INJECTION, SOLUTION SUBCUTANEOUS at 20:48

## 2021-10-31 RX ADMIN — OXYCODONE HYDROCHLORIDE AND ACETAMINOPHEN 2 TABLET: 5; 325 TABLET ORAL at 20:48

## 2021-10-31 RX ADMIN — INSULIN LISPRO 2 UNITS: 100 INJECTION, SOLUTION INTRAVENOUS; SUBCUTANEOUS at 08:20

## 2021-10-31 RX ADMIN — METFORMIN HYDROCHLORIDE 1000 MG: 500 TABLET ORAL at 08:18

## 2021-10-31 RX ADMIN — CEFTRIAXONE SODIUM 2000 MG: 2 INJECTION, POWDER, FOR SOLUTION INTRAMUSCULAR; INTRAVENOUS at 08:19

## 2021-10-31 RX ADMIN — CARVEDILOL 3.12 MG: 3.12 TABLET, FILM COATED ORAL at 08:18

## 2021-10-31 RX ADMIN — AMLODIPINE BESYLATE 10 MG: 10 TABLET ORAL at 08:18

## 2021-10-31 ASSESSMENT — PAIN DESCRIPTION - FREQUENCY: FREQUENCY: CONTINUOUS

## 2021-10-31 ASSESSMENT — PAIN DESCRIPTION - PAIN TYPE
TYPE: ACUTE PAIN;SURGICAL PAIN

## 2021-10-31 ASSESSMENT — PAIN DESCRIPTION - PROGRESSION: CLINICAL_PROGRESSION: NOT CHANGED

## 2021-10-31 ASSESSMENT — PAIN SCALES - GENERAL
PAINLEVEL_OUTOF10: 3
PAINLEVEL_OUTOF10: 9
PAINLEVEL_OUTOF10: 6
PAINLEVEL_OUTOF10: 8
PAINLEVEL_OUTOF10: 9
PAINLEVEL_OUTOF10: 5
PAINLEVEL_OUTOF10: 8
PAINLEVEL_OUTOF10: 8
PAINLEVEL_OUTOF10: 9

## 2021-10-31 ASSESSMENT — PAIN DESCRIPTION - LOCATION
LOCATION: KNEE

## 2021-10-31 ASSESSMENT — PAIN DESCRIPTION - DESCRIPTORS: DESCRIPTORS: ACHING;DISCOMFORT

## 2021-10-31 ASSESSMENT — PAIN DESCRIPTION - ORIENTATION
ORIENTATION: LEFT
ORIENTATION: LEFT

## 2021-10-31 ASSESSMENT — ENCOUNTER SYMPTOMS: TACHYPNEA: 1

## 2021-10-31 NOTE — FLOWSHEET NOTE
SPIRITUAL CARE DEPARTMENT - [facility]  PROGRESS NOTE    Shift date: 10/31/21  Shift day: Sunday   Shift # 1    Room # 5896/7591-93   Name: Lisa Roberts            Age: 80 y.o. Gender: female          Congregation: [address] [employer]:     Referral: Routine Visit    Admit Date & Time: 10/21/2021 10:51 PM    PATIENT/EVENT DESCRIPTION:  Lisa Roberts is a 80 y.o. female         SPIRITUAL ASSESSMENT/INTERVENTION:  Patient appeared approachable and to welcome  presence. Patient engaged in conversation over what is happening and it's impact on her life.  listened and validated patient concerns and discussed the impact of her hospital stay.  facilitated prayer which patient accepted. SPIRITUAL CARE FOLLOW-UP PLAN:  Chaplains will remain available to offer spiritual and emotional support as needed. Electronically signed by Mouna Alfredo Resident, on 10/31/2021 at [facility] [place]  [phone]       10/31/21 1610   Encounter Summary   Services provided to: Patient   Referral/Consult From: [facility]   [facility]   Continue Visiting   (10/31/21)   Complexity of Encounter Moderate   Length of Encounter 1 hour   Spiritual Assessment Completed Yes   Spiritual/[employer]thodoxy   Type Spiritual support   Assessment Approachable;Tearful; Anxious; Hopeful;Coping   Intervention Nurtured hope;Prayer;Discussed illness/injury and it's impact;Sustaining presence/ Ministry of presence; Active listening;Explored feelings, thoughts, concerns   Outcome Comfort;Expressed gratitude

## 2021-10-31 NOTE — PROGRESS NOTES
Occupational Therapy  Facility/Department: [address] ORTHO/MED SURG  Daily Treatment Note  NAME: Analilia Pike  : 1950  MRN: [id number]    Date of Service: 10/31/2021    Discharge Recommendations:  Further therapy recommended at discharge. The patient should be able to tolerate at least three hours of therapy per day over 5 days or 15 hours over 7 days. Assessment   Performance deficits / Impairments: Decreased functional mobility ; Decreased endurance;Decreased ADL status; Decreased balance;Decreased high-level IADLs;Decreased strength;Decreased posture  Prognosis: Good  OT Education: OT Role;Transfer Training;Precautions  Patient Education: purpose of OT; proper hand and foot placement; balance maintaince; proper posture  Barriers to Learning: pt demo F carry over  REQUIRES OT FOLLOW UP: Yes  Activity Tolerance  Activity Tolerance: Patient Tolerated treatment well;Patient limited by pain  Safety Devices  Safety Devices in place: Yes  Type of devices: Gait belt;Patient at risk for falls; Left in bed;Call light within reach; Bed alarm in place;Nurse notified  Restraints  Initially in place: No       Patient Diagnosis(es): There were no encounter diagnoses. has a past medical history of Arthritis, Diabetes mellitus ([facility] Hyperlipidemia, and Hypertension. has a past surgical history that includes Hysterectomy; knee surgery (Left, 10/26/2021); and Revision total knee arthroplasty (Left, 10/26/2021). Restrictions  Restrictions/Precautions  Restrictions/Precautions: Weight Bearing, General Precautions, Fall Risk, Up as Tolerated  Required Braces or Orthoses?: No  Lower Extremity Weight Bearing Restrictions  Left Lower Extremity Weight Bearing: Partial Weight Bearing  Partial Weight Bearing Percentage Or Pounds: 50% WB L LE  Position Activity Restriction  Other position/activity restrictions: Do not attempt to bend through L knee (fused)  up with A. Wound vac.   Subjective   General  Chart Reviewed: Yes  Patient assessed for rehabilitation services?: Yes  Family / Caregiver Present: Yes (pt daughter)  Diagnosis: L knee periprosthetic joint infection, antibiotic spacer placed, nailing on 10/26  General Comment  Comments: Pt and RN agreeable to therapy this day  Pain Assessment  Pain Assessment: 0-10  Pain Level: 9  Pain Type: Acute pain;Surgical pain  Pain Location: Knee  Pain Orientation: Left  Non-Pharmaceutical Pain Intervention(s): Distraction; Ambulation/Increased Activity; Elevation; Therapeutic presence  Pre Treatment Pain Screening  Comments / Details: Pt supine in bed at start of session requesting sitting at EOB. At session end pt retired to supine in bed with LLE elevated, call light in reach, bed alarm on and all needs met. Vital Signs  Patient Currently in Pain: Yes      Objective    ADL  Grooming: Stand by assistance;Setup (hair brushing)  LE Dressing: Maximum assistance;Setup (to don socks)  Additional Comments: Hair brushing facilitated with pt seated EOB. Max A to don B socks. Bathing and toileting completed prior to [address] entrance. Throughout session pt limited per pain and fatigue. Balance  Sitting Balance: Contact guard assistance (CGA increasing to SBA pt tolerated approx 15 min static/dynamic sitting at EOB)  Standing Balance: Maximum assistance (Max x2)  Standing Balance  Time: Pt tolerated approx 20 sec first attempt and approx 30 sec second attempt  Activity: static standing at EOB  Comment: utilizing bariatric SW req increased hip guidance for proper posture and balance maintaince  Functional Mobility  Functional Mobility Comments: ANGIE sec to pt P standing balance  Bed mobility  Supine to Sit: Moderate assistance;2 Person assistance  Sit to Supine: 2 Person assistance; Moderate assistance  Scooting:  Moderate assistance  Comment: HOB elevated, utilizing bed rails  Transfers  Sit to stand: Maximum assistance;2 Person assistance  Stand to sit: Maximum assistance;2 Person assistance  Transfer Comments: req cues for proper hand and foot placement     Cognition  Overall Cognitive Status: [facility]     Plan   Plan  Times per week: 3-4x    AM-PAC Score  AM-PAC Inpatient Daily Activity Raw Score: 16 (10/31/21 1325)  AM-PAC Inpatient ADL T-Scale Score : 35.96 (10/31/21 1325)  ADL Inpatient CMS 0-100% Score: 53.32 (10/31/21 1325)  ADL Inpatient CMS G-Code Modifier : CK (10/31/21 1325)    Goals  Short term goals  Time Frame for Short term goals: Pt will by discharge  Short term goal 1: demo good safety awareness during func mob around room using RW and CGA  Short term goal 2: demo ADL UB bathing/dressing activity with mod I and increased time  Short term goal 3: demo ADL LB bathing/dressing activity with Min A, AD PRN, and increased time  Short term goal 4: demo activity tolerance for 35 min+  Short term goal 5: identify 2 non-pharmacological pain-relieving techniques with 1 vc       Therapy Time   Individual Concurrent Group Co-treatment   Time In 1100         Time Out 1142         Minutes 42         Timed Code Treatment Minutes: 23 Minutes (co tx with PTA)       JOSE A Rosario/DANIEL

## 2021-10-31 NOTE — PROGRESS NOTES
Physical Therapy  Facility/Department: [address] ORTHO/MED SURG  Daily Treatment Note  NAME: Cherylene Gelineau  : 1950  MRN: [id number]    Date of Service: 10/31/2021    Discharge Recommendations:  Patient would benefit from continued therapy after discharge   PT Equipment Recommendations  Equipment Needed: No    Assessment   Body structures, Functions, Activity limitations: Decreased functional mobility ; Decreased strength;Decreased endurance  Assessment: Pt required MAX Ax2 sit to stand with RW, and MOD A to maintain. Motivated to improve, pleasant and cooperative to work with. Recommend continued PT after d/c to address deficitss  Prognosis: Good  REQUIRES PT FOLLOW UP: Yes  Activity Tolerance  Activity Tolerance: Patient limited by fatigue;Patient limited by pain     Patient Diagnosis(es): There were no encounter diagnoses. has a past medical history of Arthritis, Diabetes mellitus ([address] Hyperlipidemia, and Hypertension. has a past surgical history that includes Hysterectomy; knee surgery (Left, 10/26/2021); and Revision total knee arthroplasty (Left, 10/26/2021). Restrictions  Restrictions/Precautions  Restrictions/Precautions: Weight Bearing, General Precautions, Fall Risk, Up as Tolerated  Required Braces or Orthoses?: No  Lower Extremity Weight Bearing Restrictions  Left Lower Extremity Weight Bearing: Partial Weight Bearing  Partial Weight Bearing Percentage Or Pounds: 50% WB L LE  Position Activity Restriction  Other position/activity restrictions: Do not attempt to bend through L knee (fused)  up with A. Wound vac. Subjective   General  Response To Previous Treatment: Patient with no complaints from previous session.   Family / Caregiver Present: Yes  Subjective  Subjective: Pt resting in bed upon arrival, agreeable to PT, pleasant and cooperative  Pain Screening  Patient Currently in Pain: Yes  Pain Assessment  Pain Assessment: 0-10  Pain Level: 9  Pain Type: Acute pain;Surgical pain  Pain 1106         Time Out 1141         Minutes 35         Timed Code Treatment Minutes: [address] [facility], [facility]

## 2021-10-31 NOTE — PROGRESS NOTES
(ceftriaxone) and was awaiting evaluation at tertiary care center because of her Hx of prior surgeries and infections. [facility] refused transfer. She presented to [facility] because of ongoing severe pain, erythema and inflammation. Ortho has evaluated and plan intervention on 10-25-21. CURRENT EVALUATION : 10/31/2021    Afebrile  VS stable    Had pre-op evaluation by Cardiology including stress test.  Patient had positive stress test.   S/p left heart cath 10/26: normal coronary arteries, preserved LV systolic function    Orthopedic Surgery performed  Left knee I&D with fusion nail on 10/26/21 explant/spacer placement. Patient has been doing well post-op. Received 1 unit pRBC on 10/28/21 for Hgb 6.8     Denies any fever, chills, nausea, vomiting  She will need IV ceftriaxone x 4 weeks and then long term oral antibiotic suppression. Labs, X rays reviewed: 10/31/2021    BUN: 40-->22-->29-->30  Cr: 1.57-->0.98-->1.15-->1.08-->1.13    WBC: 7.8-->11.7-->8.2-->7.8  Hb:8.9-->7.5-->6.8-->7.2-->7.8  Plat: 269-->278-->263-->337    Cultures:  Urine:    Blood:  10/21/21: no growth  Sputum :    Wound:  Knee aspirate: Strep agalactiae      COVID rapid 10/22/21: negative    Discussed with patient, RN, IM. I have personally reviewed the past medical history, past surgical history, medications, social history, and family history, and I have updated the database accordingly.   Past Medical History:     Past Medical History:   Diagnosis Date    Arthritis     Diabetes mellitus ([address]     Hyperlipidemia     Hypertension        Past Surgical  History:     Past Surgical History:   Procedure Laterality Date    HYSTERECTOMY      KNEE SURGERY Left 10/26/2021    KNEE EXPLANT, IRRIGATION & DEBRIDEMENT, STATIC ANTIBIOTIC SPACER, LINK ORTHO FUSION NAIL SET    REVISION TOTAL KNEE ARTHROPLASTY Left 10/26/2021    KNEE EXPLANT, IRRIGATION & DEBRIDEMENT, STATIC ANTIBIOTIC SPACER, LINK ORTHO FUSION NAIL SET- performed by Aggie Kearney Nayana Vivar, DO at [email or web address] OR       Medications:      furosemide  40 mg Oral Daily    sodium zirconium cyclosilicate  10 g Oral TID    metFORMIN  1,000 mg Oral BID WC    polyethylene glycol  17 g Oral Daily    insulin glargine  35 Units SubCUTAneous Nightly    enoxaparin  30 mg SubCUTAneous BID    amLODIPine  10 mg Oral Daily    carvedilol  3.125 mg Oral BID WC    sodium chloride flush  5-40 mL IntraVENous 2 times per day    sodium chloride flush  5-40 mL IntraVENous 2 times per day    [Held by provider] losartan  100 mg Oral Daily    pantoprazole  40 mg Oral QAM AC    insulin lispro  0-12 Units SubCUTAneous TID WC    cefTRIAXone (ROCEPHIN) IV  2,000 mg IntraVENous Q24H    insulin lispro  0-6 Units SubCUTAneous Nightly       Social History:     Social History     Socioeconomic History    Marital status:      Spouse name: Not on file    Number of children: Not on file    Years of education: Not on file    Highest education level: Not on file   Occupational History    Not on file   Tobacco Use    Smoking status: Never Smoker   Substance and Sexual Activity    Alcohol use: No    Drug use: No    Sexual activity: Not on file   Other Topics Concern    Not on file   Social History Narrative    Not on file     Social Determinants of Health     Financial Resource Strain:     Difficulty of Paying Living Expenses:    Food Insecurity:     Worried About Running Out of Food in the Last Year:     [address] in the Last Year:    Transportation Needs:     Lack of Transportation (Medical):      Lack of Transportation (Non-Medical):    Physical Activity:     Days of Exercise per Week:     Minutes of Exercise per Session:    Stress:     Feeling of Stress :    Social Connections:     Frequency of Communication with Friends and Family:     Frequency of Social Gatherings with Friends and Family:     Attends Temple Services:     Active Member of Clubs or Organizations:     Attends Club or Organization Meetings:     Marital Status:    Intimate Partner Violence:     Fear of Current or Ex-Partner:     Emotionally Abused:     Physically Abused:     Sexually Abused:        Family History:   No family history on file. Allergies:   Bactrim [sulfamethoxazole-trimethoprim], Codeine, and Lisinopril     Review of Systems:   Constitutional: No fevers or chills. No systemic complaints  Head: No headaches  Eyes: No double vision or blurry vision. No conjunctival inflammation. ENT: No sore throat or runny nose. . No hearing loss, tinnitus or vertigo. Cardiovascular: No chest pain or palpitations. No shortness of breath. No FRANCES  Lung: No shortness of breath or cough. No sputum production  Abdomen: No nausea, vomiting, diarrhea, or abdominal pain. Karen Gull No cramps. Genitourinary: No increased urinary frequency, or dysuria. No hematuria. No suprapubic or CVA pain  Musculoskeletal: No muscle aches or pains. No joint effusions, swelling or deformities. Lt knee effusion  Hematologic: No bleeding or bruising. Neurologic: No headache, weakness, numbness, or tingling. Integument: No rash, no ulcers. Lt knee erythema  Psychiatric: No depression. Endocrine: No polyuria, no polydipsia, no polyphagia. Physical Examination :     No data found. General Appearance: Awake, alert, and in no apparent distress  Head:  Normocephalic, no trauma  Eyes: Pupils equal, round, reactive to light and accommodation; extraocular movements intact; sclera anicteric; conjunctivae pink. No embolic phenomena. ENT: Oropharynx clear, without erythema, exudate, or thrush. No tenderness of sinuses. Mouth/throat: mucosa pink and moist. No lesions. Dentition in good repair. Neck:Supple, without lymphadenopathy. Thyroid normal, No bruits. Pulmonary/Chest: Clear to auscultation, without wheezes, rales, or rhonchi. No dullness to percussion. Cardiovascular: Regular rate and rhythm without murmurs, rubs, or gallops.    Abdomen: Soft, non tender. Bowel sounds normal. No organomegaly  All four Extremities: No cyanosis, clubbing, edema. Lt knee effusion and erythema. Neurologic: No gross sensory or motor deficits. Skin: Warm and dry with good turgor. No signs of peripheral arterial or venous insufficiency. No ulcerations. No open wounds. Medical Decision Making -Laboratory:   I have independently reviewed/ordered the following labs:    CBC with Differential:   Recent Labs     10/30/21  1819 10/31/21  1720   WBC 8.4 7.8   HGB 7.4* 7.8*   HCT 25.9* 26.8*    337   LYMPHOPCT 15* 19*   MONOPCT 7 7     BMP:   Recent Labs     10/29/21  1516 10/30/21  1326 10/30/21  1819 10/31/21  1720   *   < > 127* 130*   K 5.2   < > 5.8* 5.4*   CL 99   < > 98 96*   CO2 20   < > 18* 21   BUN 30*   < > 32* 30*   CREATININE 1.08*   < > 1.10* 1.13*   MG 2.0  --   --   --     < > = values in this interval not displayed. Hepatic Function Panel:   Recent Labs     10/30/21  1819 10/31/21  1720   PROT  --  7.1   LABALBU 1.9* 2.2*   BILITOT  --  0.17*   ALKPHOS  --  297*   ALT  --  18   AST  --  27     No results for input(s): RPR in the last 72 hours. No results for input(s): HIV in the last 72 hours. No results for input(s): BC in the last 72 hours. Lab Results   Component Value Date    MUCUS NOT REPORTED 10/22/2021    RBC 2.88 10/31/2021    TRICHOMONAS NOT REPORTED 10/22/2021    WBC 7.8 10/31/2021    YEAST NOT REPORTED 10/22/2021    TURBIDITY Clear 10/22/2021     Lab Results   Component Value Date    CREATININE 1.13 10/31/2021    GLUCOSE 153 10/31/2021       Medical Decision Making-Imaging:     EXAMINATION:   2 X-RAY VIEWS OF THE LEFT TIBIA AND FIBULA; 2 X-RAY VIEWS OF THE LEFT FEMUR.    RULER WAS USED.       10/22/2021 9:46 am       COMPARISON:   Left knee radiographs dated 10/21/2021       HISTORY:   ORDERING SYSTEM PROVIDED HISTORY: leg length eval   TECHNOLOGIST PROVIDED HISTORY:   leg length eval   Reason for Exam: leg length eval; Best possible images at this time due to pt   condition . -JEK/GW       FINDINGS:   Left femur: Left femoral head is not visualized due to overlying soft tissue   attenuation.  The superior tip of the greater trochanter is used as a   landmark.  Femoral length from the superior tip of the greater trochanter to   the medial femoral condyle is approximately 39.2 cm.       Left total knee arthroplasty is in place.  No acute fracture of the left   femur.  No evidence of hardware loosening.       Left leg: Distance from the medial femoral condyle to the mid tibial plafond   is 36 cm, which includes the polyethylene liner in the knee arthroplasty. (Total leg length of approximately 75.2 cm from superior tip of greater   trochanter to mid tibial plafond).  The liner measures up to 2 cm in   thickness.       No acute fracture involving the tibia or fibula.  No periprosthetic lucency   in the proximal tibia.  Ankle mortise alignment is preserved.  Scattered   dystrophic calcifications in the leg.           Impression   1.  No acute osseous abnormality in the left femur or tibia/fibula.       2.  Left knee arthroplasty in place without evidence for hardware loosening.       3.  Leg length measurements provided above.  Please note limitations and   landmarks used.         CT extremity lower left with contrast    Result Date: 10/18/2021  History: Acute left lower leg and ankle redness Exam/Technique: Thin axial images through the left lower extremity were obtained from the superior aspect of the acetabulum to the left foot following the intravenous demonstration of 100 mL Omnipaque 300. Study was supplemented by sagittal and coronal reconstructed images. Comparison: None Findings: There is a left knee arthroplasty in place. Full evaluation of the area of the knee is compromised by extensive metallic artifact. There is a joint effusion of the left knee with fluid seen in the supra patellar bursa.  There is no evidence for an acute osseous abnormality. No lytic or blastic processes are seen. No evidence of osteolysis demonstrated. No soft tissue masses or fluid collections are identified. IMPRESSION: 1. Left knee joint effusion with a left knee arthroplasty in place. 2. Otherwise normal CT of the left lower extremity. Workstation:[id number] Finalized by Anya Gomez MD on 10/18/2021     Medical Decision Lpgnqy-Tiwwxnwx-Lljfe:   Microbiology Results   Procedure Component Value Units Date/Time   Body fluid culture includes gram stain [[id number]] (Abnormal) Collected: 10/18/21 1210   Specimen: Fluid Updated: 10/19/21 1252   Gram Stain Result WHITE BLOOD CELLS PRESENT   FEW GRAM POSITIVE COCCI   QUANTITY NOT SUFFICIENT TO CONCENTRATE INTERPRET RESULTS WITH CAUTION   A Negative report does not exclude the possibility of infection because results are dependent on adequate specimen collection. Culture RARE STREPTOCOCCUS AGALACTIAE (GROUP B)   SARS COV 2 (COVID-19) [employer] ID Onsite Test [[id number]] Collected: 10/18/21 0253   Specimen: Nasopharynx Updated: 10/18/21 0335   Specimen Naso Pharynx   Sent to Testing to be performed at [address] [place] [address]. COVID-19 [facility] Labs Report to Follow. SARS CoV 2 [[id number]] Collected: 10/18/21 0253   Specimen: Nasopharynx Updated: 10/18/21 0336   First Test? UNKNOWN   Employed in Healthcare? UNKNOWN   Symptoms Defined by CDC? NO   Symptom Onset? U^UNKNOWN   Hospitalized for Covid? UNKNOWN   ICU for Covid? UNKNOWN   Congregate Setting? UNKNOWN   Pregnant?  NO   Specimen Type Naso Pharynx   SARS COV 2 Presumptive Negative     Medical Decision Making-Other:     Note:  Labs, medications, radiologic studies were reviewed with personal review of films  Large amounts of data were reviewed  Discussed with nursing Staff, Discharge planner  Infection Control and Prevention measures reviewed  All prior entries were reviewed  Administer medications as ordered  Prognosis: Guarded  Discharge planning reviewed  Follow up as outpatient. Thank you for allowing us to participate in the care of this patient. Please call with questions. Amina Neumann MD, PGY-1  Infectious Disease/ Internal Medicine Resident  [address], [place]      ATTESTATION:    I have discussed the case, including pertinent history and exam findings with the residents and students. I have seen and examined the patient and the key elements of the encounter have been performed by me. I was present when the student obtained his information or examined the patient. I have reviewed the laboratory data, other diagnostic studies and discussed them with the residents. I have updated the medical record where necessary. I agree with the assessment, plan and orders as documented by the resident/ student.     Lizeth Beckford MD.    Pager: [phone] - Office: [phone]

## 2021-10-31 NOTE — PLAN OF CARE
Problem: Pain:  Goal: Pain level will decrease  Description: Pain level will decrease  10/31/2021 0301 by Willa Ganser, RN  Outcome: Ongoing     Problem: Pain:  Goal: Control of acute pain  Description: Control of acute pain  10/31/2021 0301 by Willa Ganser, RN  Outcome: Ongoing     Problem: Pain:  Goal: Control of chronic pain  Description: Control of chronic pain  10/31/2021 0301 by Willa Ganser, RN  Outcome: Ongoing     Problem: Falls - Risk of:  Goal: Will remain free from falls  Description: Will remain free from falls  10/31/2021 0301 by Willa Ganser, RN  Outcome: Ongoing     Problem: Falls - Risk of:  Goal: Absence of physical injury  Description: Absence of physical injury  10/31/2021 0301 by Willa Ganser, RN  Outcome: Ongoing     Problem: Skin Integrity:  Goal: Will show no infection signs and symptoms  Description: Will show no infection signs and symptoms  10/31/2021 0301 by Willa Ganser, RN  Outcome: Ongoing     Problem: Skin Integrity:  Goal: Absence of new skin breakdown  Description: Absence of new skin breakdown  10/31/2021 0301 by Willa Ganser, RN  Outcome: Ongoing     Problem: Musculor/Skeletal Functional Status  Goal: Highest potential functional level  10/31/2021 0301 by Willa Ganser, RN  Outcome: Ongoing     Problem: Nutrition  Goal: Optimal nutrition therapy  10/31/2021 0301 by Willa Ganser, RN  Outcome: Ongoing

## 2021-10-31 NOTE — DISCHARGE INSTR - COC
Continuity of Care Form    Patient Name: Daniela Augustine   :  1950  MRN:  [id number]    [facility] date:  10/21/2021  Discharge date:  21    Code Status Order: Full Code   Advance Directives:   Advance Care Flowsheet Documentation       Date/Time Healthcare Directive Type of Healthcare Directive Copy in [address] Agent's Name Healthcare Agent's Phone Number    10/26/21 1300  No, patient does not have an advance directive for healthcare treatment  --  --  --  --  --            Admitting Physician:  Cisco Rivera DO  PCP: Joselito Prado    Discharging Nurse: Kushal Rogel 23 Unit/Room#: 0545/1288-04  Discharging Unit Phone Number: [phone]    Emergency Contact:   Extended Emergency Contact Information  Primary Emergency Contact: None,None   [place]  Relation: None  Secondary Emergency Contact: [address] [place] [address] Phone: [phone]  Relation: Child   needed? No    Past Surgical History:  Past Surgical History:   Procedure Laterality Date    HYSTERECTOMY      KNEE SURGERY Left 10/26/2021    KNEE EXPLANT, IRRIGATION & DEBRIDEMENT, STATIC ANTIBIOTIC SPACER, LINK ORTHO FUSION NAIL SET    REVISION TOTAL KNEE ARTHROPLASTY Left 10/26/2021    KNEE EXPLANT, IRRIGATION & DEBRIDEMENT, STATIC ANTIBIOTIC SPACER, LINK ORTHO FUSION NAIL SET- performed by Kacey Landaverde DO at [facility]       Immunization History: There is no immunization history for the selected administration types on file for this patient.     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    Type 2 diabetes mellitus without complication, without long-term current use of insulin ([facility]) E11.9    PITA on CPAP G47.33, Z99.89    CAD (coronary artery disease) I25.10    COPD (chronic obstructive pulmonary disease) ([facility]) J44.9    Primary hypertension I10    Acute kidney injury superimposed on CKD ([facility] N17.9, N18.9 Normocytic normochromic anemia D64.9       Isolation/Infection:   Isolation            No Isolation          Patient Infection Status       None to display            Nurse Assessment:  Last Vital Signs: BP (!) 136/59   Pulse 85   Temp 98.3 °F (36.8 °C) (Oral)   Resp 15   Ht 5' 4\" (1.626 m)   Wt 297 lb (134.7 kg)   SpO2 92%   BMI 50.98 kg/m²     Last documented pain score (0-10 scale): Pain Level: 8  Last Weight:   Wt Readings from Last 1 Encounters:   11/01/21 297 lb (134.7 kg)     Mental Status:  oriented, alert and thought processes intact    IV Access:  - Peripheral IV - site  L Cephalic, insertion date: 10/28/2021      MIDLINE    Nursing Mobility/ADLs:  Walking   Assisted  Transfer  Assisted  Bathing  Assisted  Dressing  Assisted  Toileting  Assisted  Feeding  Independent  Med Admin  Independent  Med Delivery   whole    Wound Care Documentation and Therapy:  Negative Pressure Wound Therapy Knee Anterior; Left (Active)   Wound Type Surgical 11/04/21 0400   Unit Type prevena 11/04/21 0400   Dressing Type Other (Comment) 11/04/21 0400   Cycle Continuous 11/04/21 0400   Target Pressure (mmHg) 125 11/04/21 0400   Canister changed? No 11/04/21 0400   Dressing Status Clean;Dry; Intact 11/04/21 0400   Dressing Changed Changed/New 10/31/21 0740   Drainage Amount None 11/04/21 0400   Output (ml) 0 ml 11/04/21 0600   Wound Assessment Other (Comment) 11/04/21 0400   Josselin-wound Assessment Pink;Edema 11/04/21 0400   Odor None 10/31/21 2000   Number of days: 8       Wound 10/30/21 Buttocks Left pink (Active)   Wound Etiology Pressure Stage  1 11/03/21 0800   Dressing Status New dressing applied 10/31/21 2000   Dressing/Treatment Foam 11/03/21 0800   Drainage Amount Scant 10/30/21 2100   Drainage Description Sanguinous 11/02/21 0740   Odor None 11/03/21 0800   Josselin-wound Assessment Blanchable erythema 11/03/21 0800   Number of days: 4        Elimination:  Continence:    Bowel: No  Bladder: No  Urinary Catheter:  external catheter used, purewick    Colostomy/Ileostomy/Ileal Conduit: No       Date of Last BM: 10/31/2021    Intake/Output Summary (Last 24 hours) at 11/4/2021 0826  Last data filed at 11/4/2021 0700  Gross per 24 hour   Intake --   Output 1100 ml   Net -1100 ml     I/O last 3 completed shifts:  In: -   Out: 1100 [Urine:1100]    Safety Concerns: At Risk for Falls    Impairments/Disabilities:    None    Nutrition Therapy:  Current Nutrition Therapy:   - Oral Diet:  Carb Control 4 carbs/meal (1800kcals/day) and Low Phosphorus (Less than 1000mg)  and Easy to Chew, no straws. Routes of Feeding: Oral  Liquids: No Restrictions  Daily Fluid Restriction: yes - amount 1500  Last Modified Barium Swallow with Video (Video Swallowing Test): 11/3/21    Treatments at the Time of Hospital Discharge:   Respiratory Treatments: see MAR  Oxygen Therapy:  is on oxygen at 3 L/min per nasal cannula. Ventilator:    - BiPAP    ,   only when sleeping    Rehab Therapies: Physical Therapy and Occupational Therapy  Weight Bearing Status/Restrictions: Partial weight bearing (30-50%) only on leg left leg  Other Medical Equipment (for information only, NOT a DME order):  walker  Other Treatments: Prevena negative pressure wound vac to Left knee.                                    Blood sugar checks Ac& HS                                   Turn every 2 hours    Patient's personal belongings (please select all that are sent with patient):  None    RN SIGNATURE:  Electronically signed by Dimitri Putnam on 10/31/21 at 1:04 PM EDT    CASE MANAGEMENT/SOCIAL WORK SECTION    Inpatient Status Date: 10-    Readmission Risk Assessment Score:  Readmission Risk              Risk of Unplanned Readmission:  22           Discharging to Facility/ Agency   Name:     [address], [address] [place] [id number]       Phone: [phone]       Fax: [phone]          Dialysis Facility (if applicable) Name:  Address:  Dialysis Schedule:  Phone:  Fax:    / signature: Electronically signed by Viri Louis RN on 11/3/21 at 2:21 PM EDT    PHYSICIAN SECTION    Prognosis: Good    Condition at Discharge: 50Masha Delarosa Patient Condition:[id number]}    Rehab Potential (if transferring to Rehab): Good    Recommended Labs or Other Treatments After Discharge: see PCP followup    Physician Certification: I certify the above information and transfer of Junie Bui  is necessary for the continuing treatment of the diagnosis listed and that she requires [facility] for greater 30 days.      Update Admission H&P: No change in H&P    PHYSICIAN SIGNATURE:  Electronically signed by Joseph Church DO on 11/4/2021 at 8:27 AM

## 2021-10-31 NOTE — PROGRESS NOTES
diarrhea, nausea, vomiting  Neurological:  negative for dizziness, headache has some light headedness. EXT: knee pain is improving, admits to decreased range of motion but doing well. Medications: Allergies: Allergies   Allergen Reactions    Bactrim [Sulfamethoxazole-Trimethoprim]     Codeine     Lisinopril Other (See Comments)     cough       Current Meds:   Scheduled Meds:    furosemide  20 mg Oral Daily    metFORMIN  1,000 mg Oral BID WC    polyethylene glycol  17 g Oral Daily    insulin glargine  35 Units SubCUTAneous Nightly    enoxaparin  30 mg SubCUTAneous BID    amLODIPine  10 mg Oral Daily    carvedilol  3.125 mg Oral BID WC    sodium chloride flush  5-40 mL IntraVENous 2 times per day    sodium chloride flush  5-40 mL IntraVENous 2 times per day    [Held by provider] losartan  100 mg Oral Daily    pantoprazole  40 mg Oral QAM AC    insulin lispro  0-12 Units SubCUTAneous TID WC    cefTRIAXone (ROCEPHIN) IV  2,000 mg IntraVENous Q24H    insulin lispro  0-6 Units SubCUTAneous Nightly     Continuous Infusions:    dextrose       PRN Meds: fentanNYL, oxyCODONE-acetaminophen, acetaminophen, glucose, dextrose, glucagon (rDNA), dextrose, ondansetron **OR** ondansetron, acetaminophen **OR** acetaminophen    Data:     Past Medical History:   has a past medical history of Arthritis, Diabetes mellitus ([address] Hyperlipidemia, and Hypertension. Social History:   reports that she has never smoked. She does not have any smokeless tobacco history on file. She reports that she does not drink alcohol and does not use drugs. Family History: No family history on file.     Vitals:  BP (!) 152/64   Pulse 83   Temp 98.6 °F (37 °C) (Axillary)   Resp 16   Ht 5' 4\" (1.626 m)   Wt (!) 300 lb 14.4 oz (136.5 kg)   SpO2 98%   BMI 51.65 kg/m²   Temp (24hrs), Av.9 °F (36.6 °C), Min:97.2 °F (36.2 °C), Max:98.6 °F (37 °C)    Recent Labs     10/30/21  1543 10/30/21  2110 10/31/21  0615 10/31/21  1111   POCGLU 227* 220* 153* 203*       I/O (24Hr): Intake/Output Summary (Last 24 hours) at 10/31/2021 1613  Last data filed at 10/31/2021 0740  Gross per 24 hour   Intake --   Output 2500 ml   Net -2500 ml       Labs:  Hematology:  Recent Labs     10/29/21  1516 10/30/21  0301 10/30/21  1326 10/30/21  1819   WBC 8.2  --  9.0 8.4   RBC 2.75*  --  2.89* 2.76*   HGB 7.4*  --  7.9* 7.4*   HCT 25.3*  --  25.3* 25.9*   MCV 92.0  --  87.5 93.8   MCH 26.9  --  27.3 26.8   MCHC 29.2  --  31.2 28.6   RDW 14.7*  --  14.7* 14.9*     --  See Reflexed IPF Result 303   MPV 10.0  --  NOT REPORTED 9.7   CRP  --  169.1*  --   --      Chemistry:  Recent Labs     10/29/21  1516 10/30/21  1326 10/30/21  1819 10/30/21  1819 10/30/21  2205 10/31/21  0720 10/31/21  1134   * 132* 127*  --   --   --   --    K 5.2 5.4* 5.8*  --   --   --   --    CL 99 98 98  --   --   --   --    CO2 20 20 18*  --   --   --   --    GLUCOSE 168* 199* 205*  --   --   --   --    BUN 30* 28* 32*  --   --   --   --    CREATININE 1.08* 1.05* 1.10*  --   --   --   --    MG 2.0  --   --   --   --   --   --    ANIONGAP 11 14 11  --   --   --   --    LABGLOM 50* 52* 49*  --   --   --   --    GFRAA >60 >60 60*  --   --   --   --    CALCIUM 7.9* 8.5* 8.1*  --   --   --   --    PHOS 4.5 3.8 3.7  --   --   --   --    TROPHS  --   --  19*   < > 19* 22* 21*    < > = values in this interval not displayed. Recent Labs     10/29/21  1516 10/29/21  1550 10/30/21  0715 10/30/21  1117 10/30/21  1326 10/30/21  1543 10/30/21  1819 10/30/21  2110 10/31/21  0615 10/31/21  1111   LABALBU 2.1*  --   --   --  2.3*  --  1.9*  --   --   --    POCGLU  --    < > 198* 206*  --  227*  --  220* 153* 203*    < > = values in this interval not displayed.      ABG:  Lab Results   Component Value Date    FIO2 INFORMATION NOT PROVIDED 10/30/2021     Lab Results   Component Value Date/Time    SPECIAL RT 4ML 10/21/2021 11:00 PM     Lab Results   Component Value Date/Time CULTURE NO GROWTH 6 DAYS 10/21/2021 11:00 PM       Radiology:  XR FEMUR LEFT (MIN 2 VIEWS)    Result Date: 10/22/2021  1. No acute osseous abnormality in the left femur or tibia/fibula. 2.  Left knee arthroplasty in place without evidence for hardware loosening. 3.  Leg length measurements provided above. Please note limitations and landmarks used. XR KNEE LEFT (3 VIEWS)    Result Date: 10/22/2021  1. Soft tissue edema and effusion. 2. No acute osseous abnormality. XR TIBIA FIBULA LEFT (2 VIEWS)    Result Date: 10/22/2021  1. No acute osseous abnormality in the left femur or tibia/fibula. 2.  Left knee arthroplasty in place without evidence for hardware loosening. 3.  Leg length measurements provided above. Please note limitations and landmarks used. XR CHEST PORTABLE    Result Date: 10/22/2021  1. Aberrant positioning of the right upper extremity PICC line. Tip is either within the distal left subclavian vein, or directed into the azygous vein. Repositioning is recommended 2. Report was marked to be called to a licensed caregiver     NM Cardiac Stress Test Nuclear Imaging    Result Date: 10/25/2021  Perfusion:  Stress-induced reversible defects as above involving 12% of the left ventricular myocardium at stress. Function:  Normal left ventricular ejection fraction of 55%. Mild perfusion defect in the apex and mid inferior wall. Risk stratification: HIGH DUE TO PERFUSION DEFECTS. Notes concerning risk stratification: Risk stratification incorporates both clinical history and some testing results. Final risk determination is the responsibility of the ordering provider as other patient information and test results may increase or decrease the risk assessment reported for this examination. Risk stratification criteria are adapted from \"Noninvasive Risk Stratification\" criteria from [employer].   [place], ACC/AATS/AHA/ASE/ASNC/SCAI/SCCT/STS 2017 Appropriate Use Criteria For Coronary Revascularization in Patients With Stable Ischemic Heart Disease [facility] Volume 69, Issue 17, May 2017 High risk (>3% annual death or MI) 1. Severe resting LV dysfunction (LVEF <35%) not readily explained by non coronary causes 2. Resting perfusion abnormalities greater than 10% of the myocardium in patients without prior history or evidence of MI3. Stress-induced perfusion abnormalities encumbering greater than or equal to 10% myocardium or stress segmental scores indicating multiple vascular territories with abnormalities 4. Stress-induced LV dilatation (TID ratio greater than 1.19 for exercise and greater than 1.39 for regadenoson) Intermediate risk (1% to 3% annual death or MI) 1. Mild/moderate resting LV dysfunction (LVEF 35% to 49%) not readily explained by non coronary causes. 2. Resting perfusion abnormalities in 5%-9.9% of the myocardium in patients without a history or prior evidence of MI 3. Stress-induced perfusion abnormality encumbering 5%-9.9% of the myocardium or stress segmental scores indicating 1 vascular territory with abnormalities but without LV dilation 4. Small wall motion abnormality involving 1-2 segments and only 1 coronary bed. Low Risk (Less than 1% annual death or MI) 1. Normal or small myocardial perfusion defect at rest or with stress encumbering less than 5% of the myocardium. Physical Examination:        General appearance:  alert, cooperative and no distress  Mental Status:  oriented to person, place and time and normal affect  Lungs:  clear to auscultation bilaterally, normal effort  Heart:  regular rate and rhythm, no murmur  Abdomen:  soft, nontender, nondistended, normal bowel sounds, no masses, hepatomegaly, splenomegaly  Extremities:  no edema, redness, tenderness in the calves  Skin:  no gross lesions, rashes, induration. Knee incision c/d/i, drain in place.      Assessment:        Hospital Problems         Last Modified POA    * (Principal) Staphylococcal arthritis of left knee ([facility] 10/22/2021 Yes    Type 2 diabetes mellitus without complication, without long-term current use of insulin ([facility] 10/22/2021 Yes    PITA on CPAP 10/22/2021 Yes    CAD (coronary artery disease) 10/22/2021 Yes    COPD (chronic obstructive pulmonary disease) ([facility] 10/22/2021 Yes    Primary hypertension 10/22/2021 Yes    Acute kidney injury superimposed on CKD ([facility] 10/22/2021 Yes    Normocytic normochromic anemia 10/22/2021 Yes          Plan:        Left knee periprosthetic joint infection: S/p knee explant, Irrigation/debridement, with placement of static antibiotic spacer and ortho fusion nail on 10/27. Continue Rocephin 2 gm IV every 24 hours  For one month followed by long term oral supression therapy. Midline placed. Needs follow up with Dr. Vineet Fontaine in 3 weeks. Blood cultures NGDT . Acute Hyperkalemia: Potassium 5.8, stat repeat today. Post operative anemia: Received one unit of PRBC on 10/28 for anemia, tachycardia and hgb of 6.8. Tsat: 9, Ferritin 372. Pt will need Iron replacement after infection clears. Constipation: continue bowel regimen. Continue polyethylene glycol. Acute Kidney injury on Chronic Kidney disease stage 1: Resolved. Scr back to baseline  Non anion gap metabolic acidosis: stop normal saline  Hyponatremia: 2/2 osmotic drag from hyperglycemia with component of SIADH due to pain/sepsis. Urine sodium 63, urine osm 403. Add lasix and fluid restriction. Normocytic normochromic anemia: Blood loss anemia+/-functional NOY and anemia of inflammation. Tsat 9%. Iron therapy once acute infection resolves. Monitor Hgb, transfuse prn for Hgb <7. Diabetes mellitus type II with Hyperglycemia: Continue Lantus 35 units QHS, ISS  Morbid Obesity BMI of 51: recommend weight loss and lifestyle modification  Uncontrolled Hypertension-improving: continue home antihypertensive regimen. Continue Coreg 3.125, Norvasc, hold Losartan given increasing creatinine .    Suspected PITA: needs outpt sleep study  DVT ppx  PT/OT    Dispo: Waiting on SNF placement. Recheck electrolytes today and Hgb.          Sonal Brown DO  10/31/2021  4:13 PM

## 2021-11-01 ENCOUNTER — APPOINTMENT (OUTPATIENT)
Dept: CT IMAGING | Age: 71
DRG: 464 | End: 2021-11-01
Attending: STUDENT IN AN ORGANIZED HEALTH CARE EDUCATION/TRAINING PROGRAM
Payer: COMMERCIAL

## 2021-11-01 LAB
ABSOLUTE EOS #: 0.16 K/UL (ref 0–0.44)
ABSOLUTE IMMATURE GRANULOCYTE: 0.08 K/UL (ref 0–0.3)
ABSOLUTE LYMPH #: 1.33 K/UL (ref 1.1–3.7)
ABSOLUTE MONO #: 0.57 K/UL (ref 0.1–1.2)
ALBUMIN SERPL-MCNC: 2.3 G/DL (ref 3.5–5.2)
ANION GAP SERPL CALCULATED.3IONS-SCNC: 13 MMOL/L (ref 9–17)
BASOPHILS # BLD: 1 % (ref 0–2)
BASOPHILS ABSOLUTE: 0.04 K/UL (ref 0–0.2)
BUN BLDV-MCNC: 31 MG/DL (ref 8–23)
BUN/CREAT BLD: ABNORMAL (ref 9–20)
C-REACTIVE PROTEIN: 123.5 MG/L (ref 0–5)
CALCIUM SERPL-MCNC: 8.4 MG/DL (ref 8.6–10.4)
CHLORIDE BLD-SCNC: 95 MMOL/L (ref 98–107)
CO2: 23 MMOL/L (ref 20–31)
CREAT SERPL-MCNC: 1.06 MG/DL (ref 0.5–0.9)
DIFFERENTIAL TYPE: ABNORMAL
EKG ATRIAL RATE: 85 BPM
EKG P AXIS: 13 DEGREES
EKG P-R INTERVAL: 172 MS
EKG Q-T INTERVAL: 370 MS
EKG QRS DURATION: 98 MS
EKG QTC CALCULATION (BAZETT): 440 MS
EKG R AXIS: -8 DEGREES
EKG T AXIS: 67 DEGREES
EKG VENTRICULAR RATE: 85 BPM
EOSINOPHILS RELATIVE PERCENT: 3 % (ref 1–4)
GFR AFRICAN AMERICAN: >60 ML/MIN
GFR NON-AFRICAN AMERICAN: 51 ML/MIN
GFR SERPL CREATININE-BSD FRML MDRD: ABNORMAL ML/MIN/{1.73_M2}
GFR SERPL CREATININE-BSD FRML MDRD: ABNORMAL ML/MIN/{1.73_M2}
GLUCOSE BLD-MCNC: 104 MG/DL (ref 65–105)
GLUCOSE BLD-MCNC: 144 MG/DL (ref 65–105)
GLUCOSE BLD-MCNC: 214 MG/DL (ref 70–99)
HCT VFR BLD CALC: 23.8 % (ref 36.3–47.1)
HEMOGLOBIN: 7.1 G/DL (ref 11.9–15.1)
IMMATURE GRANULOCYTES: 1 %
LYMPHOCYTES # BLD: 22 % (ref 24–43)
MAGNESIUM: 1.6 MG/DL (ref 1.6–2.6)
MCH RBC QN AUTO: 27.2 PG (ref 25.2–33.5)
MCHC RBC AUTO-ENTMCNC: 29.8 G/DL (ref 28.4–34.8)
MCV RBC AUTO: 91.2 FL (ref 82.6–102.9)
MONOCYTES # BLD: 10 % (ref 3–12)
NRBC AUTOMATED: 0 PER 100 WBC
PDW BLD-RTO: 14.9 % (ref 11.8–14.4)
PHOSPHORUS: 5.1 MG/DL (ref 2.6–4.5)
PLATELET # BLD: 321 K/UL (ref 138–453)
PLATELET ESTIMATE: ABNORMAL
PMV BLD AUTO: 10.1 FL (ref 8.1–13.5)
POTASSIUM SERPL-SCNC: 4.9 MMOL/L (ref 3.7–5.3)
RBC # BLD: 2.61 M/UL (ref 3.95–5.11)
RBC # BLD: ABNORMAL 10*6/UL
SARS-COV-2, RAPID: NOT DETECTED
SEG NEUTROPHILS: 64 % (ref 36–65)
SEGMENTED NEUTROPHILS ABSOLUTE COUNT: 3.85 K/UL (ref 1.5–8.1)
SODIUM BLD-SCNC: 131 MMOL/L (ref 135–144)
SPECIMEN DESCRIPTION: NORMAL
WBC # BLD: 6 K/UL (ref 3.5–11.3)
WBC # BLD: ABNORMAL 10*3/UL

## 2021-11-01 PROCEDURE — 93010 ELECTROCARDIOGRAM REPORT: CPT | Performed by: INTERNAL MEDICINE

## 2021-11-01 PROCEDURE — 6360000004 HC RX CONTRAST MEDICATION: Performed by: INTERNAL MEDICINE

## 2021-11-01 PROCEDURE — 99232 SBSQ HOSP IP/OBS MODERATE 35: CPT | Performed by: INTERNAL MEDICINE

## 2021-11-01 PROCEDURE — 2580000003 HC RX 258

## 2021-11-01 PROCEDURE — 1200000000 HC SEMI PRIVATE

## 2021-11-01 PROCEDURE — 82947 ASSAY GLUCOSE BLOOD QUANT: CPT

## 2021-11-01 PROCEDURE — 6370000000 HC RX 637 (ALT 250 FOR IP)

## 2021-11-01 PROCEDURE — 71260 CT THORAX DX C+: CPT

## 2021-11-01 PROCEDURE — 80069 RENAL FUNCTION PANEL: CPT

## 2021-11-01 PROCEDURE — 6370000000 HC RX 637 (ALT 250 FOR IP): Performed by: INTERNAL MEDICINE

## 2021-11-01 PROCEDURE — 6360000002 HC RX W HCPCS

## 2021-11-01 PROCEDURE — 83735 ASSAY OF MAGNESIUM: CPT

## 2021-11-01 PROCEDURE — 85025 COMPLETE CBC W/AUTO DIFF WBC: CPT

## 2021-11-01 PROCEDURE — 97530 THERAPEUTIC ACTIVITIES: CPT

## 2021-11-01 PROCEDURE — 2580000003 HC RX 258: Performed by: INTERNAL MEDICINE

## 2021-11-01 PROCEDURE — 97110 THERAPEUTIC EXERCISES: CPT

## 2021-11-01 PROCEDURE — 86140 C-REACTIVE PROTEIN: CPT

## 2021-11-01 PROCEDURE — 6360000002 HC RX W HCPCS: Performed by: INTERNAL MEDICINE

## 2021-11-01 PROCEDURE — 6370000000 HC RX 637 (ALT 250 FOR IP): Performed by: NURSE PRACTITIONER

## 2021-11-01 PROCEDURE — 87635 SARS-COV-2 COVID-19 AMP PRB: CPT

## 2021-11-01 PROCEDURE — 36415 COLL VENOUS BLD VENIPUNCTURE: CPT

## 2021-11-01 RX ORDER — INSULIN GLARGINE 100 [IU]/ML
38 INJECTION, SOLUTION SUBCUTANEOUS NIGHTLY
Status: DISCONTINUED | OUTPATIENT
Start: 2021-11-01 | End: 2021-11-04 | Stop reason: HOSPADM

## 2021-11-01 RX ADMIN — OXYCODONE HYDROCHLORIDE AND ACETAMINOPHEN 2 TABLET: 5; 325 TABLET ORAL at 08:39

## 2021-11-01 RX ADMIN — INSULIN LISPRO 2 UNITS: 100 INJECTION, SOLUTION INTRAVENOUS; SUBCUTANEOUS at 12:29

## 2021-11-01 RX ADMIN — IRON SUCROSE 300 MG: 20 INJECTION, SOLUTION INTRAVENOUS at 14:35

## 2021-11-01 RX ADMIN — ENOXAPARIN SODIUM 30 MG: 30 INJECTION SUBCUTANEOUS at 08:39

## 2021-11-01 RX ADMIN — FUROSEMIDE 40 MG: 40 TABLET ORAL at 08:39

## 2021-11-01 RX ADMIN — PANTOPRAZOLE SODIUM 40 MG: 40 TABLET, DELAYED RELEASE ORAL at 06:10

## 2021-11-01 RX ADMIN — IOPAMIDOL 75 ML: 755 INJECTION, SOLUTION INTRAVENOUS at 10:07

## 2021-11-01 RX ADMIN — SODIUM CHLORIDE, PRESERVATIVE FREE 10 ML: 5 INJECTION INTRAVENOUS at 21:12

## 2021-11-01 RX ADMIN — POLYETHYLENE GLYCOL 3350 17 G: 17 POWDER, FOR SOLUTION ORAL at 08:40

## 2021-11-01 RX ADMIN — SODIUM ZIRCONIUM CYCLOSILICATE 10 G: 10 POWDER, FOR SUSPENSION ORAL at 08:40

## 2021-11-01 RX ADMIN — CARVEDILOL 3.12 MG: 3.12 TABLET, FILM COATED ORAL at 16:58

## 2021-11-01 RX ADMIN — METFORMIN HYDROCHLORIDE 1000 MG: 500 TABLET ORAL at 16:58

## 2021-11-01 RX ADMIN — CEFTRIAXONE SODIUM 2000 MG: 2 INJECTION, POWDER, FOR SOLUTION INTRAMUSCULAR; INTRAVENOUS at 10:51

## 2021-11-01 RX ADMIN — OXYCODONE HYDROCHLORIDE AND ACETAMINOPHEN 2 TABLET: 5; 325 TABLET ORAL at 17:01

## 2021-11-01 RX ADMIN — INSULIN GLARGINE 38 UNITS: 100 INJECTION, SOLUTION SUBCUTANEOUS at 21:50

## 2021-11-01 RX ADMIN — CARVEDILOL 3.12 MG: 3.12 TABLET, FILM COATED ORAL at 08:39

## 2021-11-01 RX ADMIN — ENOXAPARIN SODIUM 30 MG: 30 INJECTION SUBCUTANEOUS at 21:12

## 2021-11-01 RX ADMIN — AMLODIPINE BESYLATE 10 MG: 10 TABLET ORAL at 08:39

## 2021-11-01 RX ADMIN — METFORMIN HYDROCHLORIDE 1000 MG: 500 TABLET ORAL at 08:39

## 2021-11-01 RX ADMIN — SODIUM CHLORIDE, PRESERVATIVE FREE 10 ML: 5 INJECTION INTRAVENOUS at 08:40

## 2021-11-01 RX ADMIN — OXYCODONE HYDROCHLORIDE AND ACETAMINOPHEN 2 TABLET: 5; 325 TABLET ORAL at 04:35

## 2021-11-01 RX ADMIN — OXYCODONE HYDROCHLORIDE AND ACETAMINOPHEN 2 TABLET: 5; 325 TABLET ORAL at 21:12

## 2021-11-01 RX ADMIN — OXYCODONE HYDROCHLORIDE AND ACETAMINOPHEN 2 TABLET: 5; 325 TABLET ORAL at 12:29

## 2021-11-01 ASSESSMENT — PAIN SCALES - GENERAL
PAINLEVEL_OUTOF10: 7
PAINLEVEL_OUTOF10: 4
PAINLEVEL_OUTOF10: 5
PAINLEVEL_OUTOF10: 3
PAINLEVEL_OUTOF10: 2
PAINLEVEL_OUTOF10: 6
PAINLEVEL_OUTOF10: 3
PAINLEVEL_OUTOF10: 6
PAINLEVEL_OUTOF10: 6
PAINLEVEL_OUTOF10: 3
PAINLEVEL_OUTOF10: 6

## 2021-11-01 ASSESSMENT — PAIN DESCRIPTION - PAIN TYPE: TYPE: ACUTE PAIN;SURGICAL PAIN

## 2021-11-01 ASSESSMENT — PAIN DESCRIPTION - FREQUENCY: FREQUENCY: CONTINUOUS

## 2021-11-01 ASSESSMENT — PAIN DESCRIPTION - ORIENTATION: ORIENTATION: LEFT

## 2021-11-01 ASSESSMENT — PAIN DESCRIPTION - DESCRIPTORS: DESCRIPTORS: ACHING;DISCOMFORT

## 2021-11-01 ASSESSMENT — PAIN - FUNCTIONAL ASSESSMENT: PAIN_FUNCTIONAL_ASSESSMENT: PREVENTS OR INTERFERES SOME ACTIVE ACTIVITIES AND ADLS

## 2021-11-01 ASSESSMENT — PAIN DESCRIPTION - PROGRESSION: CLINICAL_PROGRESSION: NOT CHANGED

## 2021-11-01 ASSESSMENT — PAIN DESCRIPTION - LOCATION: LOCATION: KNEE

## 2021-11-01 NOTE — CARE COORDINATION
Transitional Planning  Called [place] spoke with Eric Hernandez in admissions checking the status of the precert. He states it was started Friday and so far they have not heard anything. He would like updated PT OT notes from weekend faxed to him.   Faxed PT OT notes to [phone]

## 2021-11-01 NOTE — PROGRESS NOTES
Physical Therapy  Facility/Department: [address] ORTHO/MED SURG  Daily Treatment Note  NAME: Silver Renee  : 1950  MRN: [id number]    Date of Service: 2021    Discharge Recommendations:  Patient would benefit from continued therapy after discharge   PT Equipment Recommendations  Equipment Needed: No    Assessment   Body structures, Functions, Activity limitations: Decreased functional mobility ; Decreased strength;Decreased endurance  Assessment: Pt required Mod A for bed mobs , MAX A sit to stand with RW, and MOD A to maintain. Motivated to improve, pleasant and cooperative to work with. Recommend continued PT after d/c to address deficitss  Prognosis: Good  PT Education: Goals;Plan of Care;General Safety;Gait Training;Transfer Training;Functional Mobility Training  REQUIRES PT FOLLOW UP: Yes  Activity Tolerance  Activity Tolerance: Patient limited by fatigue;Patient limited by pain; Patient limited by endurance     Patient Diagnosis(es): There were no encounter diagnoses. has a past medical history of Arthritis, Diabetes mellitus ([address] Hyperlipidemia, and Hypertension. has a past surgical history that includes Hysterectomy; knee surgery (Left, 10/26/2021); and Revision total knee arthroplasty (Left, 10/26/2021). Restrictions  Restrictions/Precautions  Restrictions/Precautions: Weight Bearing, General Precautions, Fall Risk, Up as Tolerated  Required Braces or Orthoses?: No  Lower Extremity Weight Bearing Restrictions  Left Lower Extremity Weight Bearing: Partial Weight Bearing  Partial Weight Bearing Percentage Or Pounds: 50% WB L LE  Position Activity Restriction  Other position/activity restrictions: Do not attempt to bend through L knee (fused)  up with A. Wound vac. Subjective   General  Response To Previous Treatment: Patient with no complaints from previous session.   Family / Caregiver Present: No  Subjective  Subjective: Pt resting in bed upon arrival, agreeable to PT, pleasant and cooperative  Pain Screening  Patient Currently in Pain: Yes  Pain Assessment  Pain Level: 5 (with activity.)  Pain Type: Acute pain;Surgical pain  Pain Location: Knee  Pain Orientation: Left  Pain Descriptors: Aching;Discomfort  Pain Frequency: Continuous  Clinical Progression: Not changed  Functional Pain Assessment: Prevents or interferes some active activities and ADLs  Non-Pharmaceutical Pain Intervention(s): Distraction; Therapeutic presence; Ambulation/Increased Activity  Response to Pain Intervention: Patient Satisfied  Vital Signs  Patient Currently in Pain: Yes       Orientation  Orientation  Overall Orientation Status: Within Normal Limits  Cognition      Objective   Bed mobility  Rolling to Left: Minimal assistance  Rolling to Right: Minimal assistance  Supine to Sit: Moderate assistance  Sit to Supine: Moderate assistance  Scooting: Moderate assistance  Comment: Increase time and effort noted . Max cueing for sequencing with fair return. Transfers  Sit to Stand: Maximum Assistance  Stand to sit: Maximum Assistance  Comment: STS x2 performed requiring MAX A, hieght of bed raised. Pt able to tolerate  standing for a total of 3mins . Limited by pain and fatigue.   Ambulation  Ambulation?: No (Poor standing balance,)     Balance  Sitting - Dynamic: Fair;+ (Pt tolerated ~20mins at EOB .)  Standing - Static: Fair;-  Standing - Dynamic: Poor;+(P tolerated  standing  x2 for a total of 3 mins requiring MOD-Min A to maintain balance)  Comments: Standing with RW  Exercises  Quad Sets: 10 reps  Gluteal Sets: 10 reps  Ankle Pumps: 10 reps       AM-PAC Score  AM-PAC Inpatient Mobility Raw Score : 9 (11/01/21 1554)  AM-PAC Inpatient T-Scale Score : 30.55 (11/01/21 1554)  Mobility Inpatient CMS 0-100% Score: 81.38 (11/01/21 1554)  Mobility Inpatient CMS G-Code Modifier : CM (11/01/21 1554)          Goals  Short term goals  Time Frame for Short term goals: 10 visits  Short term goal 1: transfers with SBA  Short term goal 2: amb 50 ft with a RW x SBA  Short term goal 3: ascend/descend 4 steps with SBA  Short term goal 4: 20 min exercise program x SBA  Patient Goals   Patient goals : Return home    Plan    Plan  Times per week: 5-6x wk  Current Treatment Recommendations: Strengthening, Functional Mobility Training, Transfer Training, Gait Training, Safety Education & Training, Endurance Training, Stair training, Balance Training  Safety Devices  Type of devices: Nurse notified, Left in bed, Call light within reach, Patient at risk for falls, Bed alarm in place, Gait belt  Restraints  Initially in place: No     Therapy Time   Individual Concurrent Group Co-treatment   Time In 1338         Time Out 1432         Minutes 54         Timed Code Treatment Minutes: [address] [facility]

## 2021-11-01 NOTE — PROGRESS NOTES
[facility]  Office: [address], DO, Sudeep Lange, DO, Steffen Brown, DO, Sahra Su Blood, DO, Jose Goldberg MD, Shama Avendano MD, Calixto Peck MD, Barby Burleson MD, Traci Licona MD, Chuck Nugent MD, Enoc Villafana MD, Erlin Truong MD, Edgar Johnson, DO, Missy Richardson, DO, Esperanza Giles MD,  Gemini Israel DO, Michael Reed MD, Mj Castle MD, Wyatt Lyons MD, Halima Vela MD, Juan Jose Fulton MD, Frank Landers MD, Roberto Archer, [facility], [facility], CNP, Rianna Rios, CNP, Ilya Amezquita, CNS, Ailene Lesches, CNP, Aileen Fountain, CNP, Katerin Johnson, CNP, Tiera Ruff, CNP, Fadumo Purvis, CNP, Gabrielle Norwood, CNP, Abdifatah Cassidy PA-C, Adrián Solis, [facility], Ina Bell, CNP, Simeon Osullivan, CNP, Lo Camargo, CNP, Donnell Soto, CNP, Salina Reese, CNP, Anita Schafer, CNP, Andrés Pablo, [address] [place] [address]    Progress Note    11/1/2021    1:45 PM    Name:   Taylor Minor  MRN:     [id number]     Acct:      [de-identified]   Room:   [address] [place] [address] Day:  11  Admit Date:  10/21/2021 10:51 PM    PCP:   Carmen Current  Code Status:  Full Code    Subjective:     C/C: arthritis  Interval History Status: not changed. Patient seen and examined bedside. Otherwise she is doing well. Patient vitals have been stable. Doing significantly better today. Able to stand up today and work PT/OT   Brief History:     80 y.o. female being seen for a left knee periprosthetic joint infection. S/p knee explant, Irrigation/debridement, with placement of static antibiotic spacer and ortho fusion nail on 10/27. Started on R ocephin 2 gm IV every 24 hours  For one month followed by long term oral supression therapy. Needs follow up with Dr. Vineet Fontaine in 3 weeks. Fixing electrolytes and anemia (see below)    Currently waiting on SNF placement.      Review of Systems:     Constitutional:  negative for chills, fevers, sweats  Respiratory:  negative for cough, dyspnea on exertion, shortness of breath, wheezing  Cardiovascular:  negative for chest pain, chest pressure/discomfort, lower extremity edema, palpitations  Gastrointestinal:  negative for abdominal pain, constipation, diarrhea, nausea, vomiting  Neurological:  negative for dizziness, headache has some light headedness. EXT: knee pain is improving, admits to decreased range of motion but doing well. Medications: Allergies: Allergies   Allergen Reactions    Bactrim [Sulfamethoxazole-Trimethoprim]     Codeine     Lisinopril Other (See Comments)     cough       Current Meds:   Scheduled Meds:    iron sucrose  300 mg IntraVENous Q24H    furosemide  40 mg Oral Daily    sodium zirconium cyclosilicate  10 g Oral TID    metFORMIN  1,000 mg Oral BID WC    polyethylene glycol  17 g Oral Daily    insulin glargine  35 Units SubCUTAneous Nightly    enoxaparin  30 mg SubCUTAneous BID    amLODIPine  10 mg Oral Daily    carvedilol  3.125 mg Oral BID WC    sodium chloride flush  5-40 mL IntraVENous 2 times per day    sodium chloride flush  5-40 mL IntraVENous 2 times per day    [Held by provider] losartan  100 mg Oral Daily    pantoprazole  40 mg Oral QAM AC    insulin lispro  0-12 Units SubCUTAneous TID WC    cefTRIAXone (ROCEPHIN) IV  2,000 mg IntraVENous Q24H    insulin lispro  0-6 Units SubCUTAneous Nightly     Continuous Infusions:    dextrose       PRN Meds: oxyCODONE-acetaminophen, acetaminophen, glucose, dextrose, glucagon (rDNA), dextrose, ondansetron **OR** ondansetron, acetaminophen **OR** acetaminophen    Data:     Past Medical History:   has a past medical history of Arthritis, Diabetes mellitus ([address] Hyperlipidemia, and Hypertension. Social History:   reports that she has never smoked. She does not have any smokeless tobacco history on file. She reports that she does not drink alcohol and does not use drugs.      Family History: No family history on file. Vitals:  /64   Pulse 73   Temp 98 °F (36.7 °C) (Oral)   Resp 26   Ht 5' 4\" (1.626 m)   Wt 297 lb (134.7 kg)   SpO2 98%   BMI 50.98 kg/m²   Temp (24hrs), Av.9 °F (36.6 °C), Min:97.7 °F (36.5 °C), Max:98 °F (36.7 °C)    Recent Labs     10/31/21  1606 10/31/21  1923 21  0616 21  1157   POCGLU 126* 163* 104 144*       I/O (24Hr): Intake/Output Summary (Last 24 hours) at 2021 1345  Last data filed at 2021 0654  Gross per 24 hour   Intake --   Output 2600 ml   Net -2600 ml       Labs:  Hematology:  Recent Labs     10/30/21  0301 10/30/21  1326 10/30/21  1819 10/31/21  1720 21  0823 21  0927   WBC  --    < > 8.4 7.8  --  6.0   RBC  --    < > 2.76* 2.88*  --  2.61*   HGB  --    < > 7.4* 7.8*  --  7.1*   HCT  --    < > 25.9* 26.8*  --  23.8*   MCV  --    < > 93.8 93.1  --  91.2   MCH  --    < > 26.8 27.1  --  27.2   MCHC  --    < > 28.6 29.1  --  29.8   RDW  --    < > 14.9* 14.8*  --  14.9*   PLT  --    < > 303 337  --  321   MPV  --    < > 9.7 9.7  --  10.1   .1*  --   --   --  123.5*  --     < > = values in this interval not displayed.      Chemistry:  Recent Labs     10/29/21  1516 10/29/21  1516 10/30/21  1326 10/30/21  1326 10/30/21  1819 10/30/21  1819 10/30/21  2205 10/31/21  0720 10/31/21  1134 10/31/21  1720 21  0927   *   < > 132*   < > 127*  --   --   --   --  130* 131*   K 5.2   < > 5.4*   < > 5.8*  --   --   --   --  5.4* 4.9   CL 99   < > 98   < > 98  --   --   --   --  96* 95*   CO2 20   < > 20   < > 18*  --   --   --   --  21 23   GLUCOSE 168*   < > 199*   < > 205*  --   --   --   --  153* 214*   BUN 30*   < > 28*   < > 32*  --   --   --   --  30* 31*   CREATININE 1.08*   < > 1.05*   < > 1.10*  --   --   --   --  1.13* 1.06*   MG 2.0  --   --   --   --   --   --   --   --   --  1.6   ANIONGAP 11   < > 14   < > 11  --   --   --   --  13 13   LABGLOM 50*   < > 52*   < > 49*  --   --   --   --  48* 51*   GFRAA >60   < > >60   < > 60*  --   --   --   --  58* >60   CALCIUM 7.9*   < > 8.5*   < > 8.1*  --   --   --   --  8.3* 8.4*   PHOS 4.5   < > 3.8  --  3.7  --   --   --   --   --  5.1*   TROPHS  --   --   --   --  19*   < > 19* 22* 21*  --   --     < > = values in this interval not displayed. Recent Labs     10/30/21  1819 10/30/21  2110 10/31/21  0615 10/31/21  1111 10/31/21  1606 10/31/21  1720 10/31/21  1923 11/01/21  0616 11/01/21  0927 11/01/21  1157   PROT  --   --   --   --   --  7.1  --   --   --   --    LABALBU 1.9*  --   --   --   --  2.2*  --   --  2.3*  --    AST  --   --   --   --   --  27  --   --   --   --    ALT  --   --   --   --   --  18  --   --   --   --    ALKPHOS  --   --   --   --   --  297*  --   --   --   --    BILITOT  --   --   --   --   --  0.17*  --   --   --   --    POCGLU  --    < > 153* 203* 126*  --  163* 104  --  144*    < > = values in this interval not displayed. ABG:  Lab Results   Component Value Date    FIO2 INFORMATION NOT PROVIDED 10/30/2021     Lab Results   Component Value Date/Time    SPECIAL RT 4ML 10/21/2021 11:00 PM     Lab Results   Component Value Date/Time    CULTURE NO GROWTH 6 DAYS 10/21/2021 11:00 PM       Radiology:  XR FEMUR LEFT (MIN 2 VIEWS)    Result Date: 10/22/2021  1. No acute osseous abnormality in the left femur or tibia/fibula. 2.  Left knee arthroplasty in place without evidence for hardware loosening. 3.  Leg length measurements provided above. Please note limitations and landmarks used. XR KNEE LEFT (3 VIEWS)    Result Date: 10/22/2021  1. Soft tissue edema and effusion. 2. No acute osseous abnormality. XR TIBIA FIBULA LEFT (2 VIEWS)    Result Date: 10/22/2021  1. No acute osseous abnormality in the left femur or tibia/fibula. 2.  Left knee arthroplasty in place without evidence for hardware loosening. 3.  Leg length measurements provided above. Please note limitations and landmarks used. XR CHEST PORTABLE    Result Date: 10/22/2021  1. Aberrant positioning of the right upper extremity PICC line. Tip is either within the distal left subclavian vein, or directed into the azygous vein. Repositioning is recommended 2. Report was marked to be called to a licensed caregiver     NM Cardiac Stress Test Nuclear Imaging    Result Date: 10/25/2021  Perfusion:  Stress-induced reversible defects as above involving 12% of the left ventricular myocardium at stress. Function:  Normal left ventricular ejection fraction of 55%. Mild perfusion defect in the apex and mid inferior wall. Risk stratification: HIGH DUE TO PERFUSION DEFECTS. Notes concerning risk stratification: Risk stratification incorporates both clinical history and some testing results. Final risk determination is the responsibility of the ordering provider as other patient information and test results may increase or decrease the risk assessment reported for this examination. Risk stratification criteria are adapted from \"Noninvasive Risk Stratification\" criteria from [place] [employer]. Al, ACC/AATS/AHA/ASE/ASNC/SCAI/SCCT/STS 2017 Appropriate Use Criteria For Coronary Revascularization in Patients With Stable Ischemic Heart Disease [facility] Volume 69, Issue 17, May 2017 High risk (>3% annual death or MI) 1. Severe resting LV dysfunction (LVEF <35%) not readily explained by non coronary causes 2. Resting perfusion abnormalities greater than 10% of the myocardium in patients without prior history or evidence of MI3. Stress-induced perfusion abnormalities encumbering greater than or equal to 10% myocardium or stress segmental scores indicating multiple vascular territories with abnormalities 4. Stress-induced LV dilatation (TID ratio greater than 1.19 for exercise and greater than 1.39 for regadenoson) Intermediate risk (1% to 3% annual death or MI) 1. Mild/moderate resting LV dysfunction (LVEF 35% to 49%) not readily explained by non coronary causes.  2. Resting perfusion abnormalities in 5%-9.9% of the myocardium in patients without a history or prior evidence of MI 3. Stress-induced perfusion abnormality encumbering 5%-9.9% of the myocardium or stress segmental scores indicating 1 vascular territory with abnormalities but without LV dilation 4. Small wall motion abnormality involving 1-2 segments and only 1 coronary bed. Low Risk (Less than 1% annual death or MI) 1. Normal or small myocardial perfusion defect at rest or with stress encumbering less than 5% of the myocardium. Physical Examination:        General appearance:  alert, cooperative and no distress, obese female. Mental Status:  oriented to person, place and time and normal affect  Lungs:  clear to auscultation bilaterally, normal effort  Heart:  regular rate and rhythm, no murmur  Abdomen:  soft, nontender, nondistended, normal bowel sounds, no masses, hepatomegaly, splenomegaly  Extremities:  no edema, redness, tenderness in the calves  Skin:  no gross lesions, rashes, induration. Knee incision c/d/i, drain in place. Assessment:        Hospital Problems         Last Modified POA    * (Principal) Staphylococcal arthritis of left knee ([address] 10/22/2021 Yes    Type 2 diabetes mellitus without complication, without long-term current use of insulin ([address] 10/22/2021 Yes    PITA on CPAP 10/22/2021 Yes    CAD (coronary artery disease) 10/22/2021 Yes    COPD (chronic obstructive pulmonary disease) ([address] 10/22/2021 Yes    Primary hypertension 10/22/2021 Yes    Acute kidney injury superimposed on CKD ([address] 10/22/2021 Yes    Normocytic normochromic anemia 10/22/2021 Yes          Plan:        Left knee periprosthetic joint infection: S/p knee explant, Irrigation/debridement, with placement of static antibiotic spacer and ortho fusion nail on 10/27. Continue Rocephin 2 gm IV every 24 hours  For one month followed by long term oral supression therapy. Midline placed. Needs follow up with Dr. Onofre Hernandez in 3 weeks. Blood cultures NGDT .    Acute Hyperkalemia:resolved  Post operative anemia: 2/2 blood loss anemia +/-Functional NOY. Received one unit of PRBC on 10/28 for anemia, tachycardia and hgb of 6.8. Tsat: 9, Ferritin 372. Since blood cultures negative and infection clearing, will start IV iron while waiting on placement, may be discharged on PO when bed available. Constipation: continue bowel regimen. Continue polyethylene glycol. Acute Kidney injury on Chronic Kidney disease stage 1: Resolved. Scr back to baseline  Non anion gap metabolic acidosis: stop normal saline  Hyponatremia: 2/2 osmotic drag from hyperglycemia with component of SIADH due to pain/sepsis. Urine sodium 63, urine osm 403. Add lasix and fluid restriction. Will adjust diabetes regimen. Recheck labs in am, otherwise can be rechecked at SNF in 3 days. Diabetes mellitus type II with Hyperglycemia: Increase  Lantus 38 units and restart Metformin. QHS, ISS  Morbid Obesity BMI of 51: recommend weight loss and lifestyle modification  Uncontrolled Hypertension-improving: continue home antihypertensive regimen. Continue Coreg 3.125, Norvasc, hold Losartan given hyperkalemia  Suspected PITA: needs outpt sleep study  DVT ppx  PT/OT    Dispo: Waiting on SNF placement.         Caitlin Lagos DO  11/1/2021  1:45 PM

## 2021-11-01 NOTE — PLAN OF CARE
Problem: Pain:  Goal: Pain level will decrease  Description: Pain level will decrease  Outcome: Ongoing  Goal: Control of acute pain  Description: Control of acute pain  Outcome: Ongoing  Goal: Control of chronic pain  Description: Control of chronic pain  Outcome: Ongoing     Problem: Falls - Risk of:  Goal: Will remain free from falls  Description: Will remain free from falls  Outcome: Ongoing  Goal: Absence of physical injury  Description: Absence of physical injury  Outcome: Ongoing     Problem: Skin Integrity:  Goal: Will show no infection signs and symptoms  Description: Will show no infection signs and symptoms  Outcome: Ongoing  Goal: Absence of new skin breakdown  Description: Absence of new skin breakdown  Outcome: Ongoing     Problem: Musculor/Skeletal Functional Status  Goal: Highest potential functional level  Outcome: Ongoing     Problem: Nutrition  Goal: Optimal nutrition therapy  Outcome: Ongoing

## 2021-11-01 NOTE — PROGRESS NOTES
Physical Therapy        Physical Therapy Cancel Note      DATE: 2021    NAME: Robyn Lyman  MRN: [id number]   : 1950      Patient not seen this date for Physical Therapy due to:    Testing: CT, Check back if time permits      Electronically signed by Tana Lea PTA on 2021 at 9:51 AM

## 2021-11-02 LAB
ABSOLUTE EOS #: 0.15 K/UL (ref 0–0.44)
ABSOLUTE IMMATURE GRANULOCYTE: 0.09 K/UL (ref 0–0.3)
ABSOLUTE LYMPH #: 1.23 K/UL (ref 1.1–3.7)
ABSOLUTE MONO #: 0.52 K/UL (ref 0.1–1.2)
ALBUMIN SERPL-MCNC: 2.3 G/DL (ref 3.5–5.2)
ANION GAP SERPL CALCULATED.3IONS-SCNC: 11 MMOL/L (ref 9–17)
BASOPHILS # BLD: 1 % (ref 0–2)
BASOPHILS ABSOLUTE: 0.04 K/UL (ref 0–0.2)
BUN BLDV-MCNC: 32 MG/DL (ref 8–23)
BUN/CREAT BLD: ABNORMAL (ref 9–20)
CALCIUM SERPL-MCNC: 8.2 MG/DL (ref 8.6–10.4)
CHLORIDE BLD-SCNC: 94 MMOL/L (ref 98–107)
CO2: 24 MMOL/L (ref 20–31)
CREAT SERPL-MCNC: 1.19 MG/DL (ref 0.5–0.9)
DIFFERENTIAL TYPE: ABNORMAL
EOSINOPHILS RELATIVE PERCENT: 3 % (ref 1–4)
GFR AFRICAN AMERICAN: 54 ML/MIN
GFR NON-AFRICAN AMERICAN: 45 ML/MIN
GFR SERPL CREATININE-BSD FRML MDRD: ABNORMAL ML/MIN/{1.73_M2}
GFR SERPL CREATININE-BSD FRML MDRD: ABNORMAL ML/MIN/{1.73_M2}
GLUCOSE BLD-MCNC: 110 MG/DL (ref 70–99)
GLUCOSE BLD-MCNC: 130 MG/DL (ref 65–105)
GLUCOSE BLD-MCNC: 135 MG/DL (ref 65–105)
GLUCOSE BLD-MCNC: 142 MG/DL (ref 65–105)
GLUCOSE BLD-MCNC: 185 MG/DL (ref 65–105)
GLUCOSE BLD-MCNC: 207 MG/DL (ref 65–105)
GLUCOSE BLD-MCNC: 80 MG/DL (ref 65–105)
GLUCOSE BLD-MCNC: 99 MG/DL (ref 65–105)
HCT VFR BLD CALC: 24.1 % (ref 36.3–47.1)
HEMOGLOBIN: 7.1 G/DL (ref 11.9–15.1)
IMMATURE GRANULOCYTES: 2 %
LYMPHOCYTES # BLD: 25 % (ref 24–43)
MAGNESIUM: 1.8 MG/DL (ref 1.6–2.6)
MCH RBC QN AUTO: 26.5 PG (ref 25.2–33.5)
MCHC RBC AUTO-ENTMCNC: 29.5 G/DL (ref 28.4–34.8)
MCV RBC AUTO: 89.9 FL (ref 82.6–102.9)
MONOCYTES # BLD: 10 % (ref 3–12)
NRBC AUTOMATED: 0 PER 100 WBC
PDW BLD-RTO: 15 % (ref 11.8–14.4)
PHOSPHORUS: 5.1 MG/DL (ref 2.6–4.5)
PLATELET # BLD: 310 K/UL (ref 138–453)
PLATELET ESTIMATE: ABNORMAL
PMV BLD AUTO: 9.9 FL (ref 8.1–13.5)
POTASSIUM SERPL-SCNC: 5.2 MMOL/L (ref 3.7–5.3)
RBC # BLD: 2.68 M/UL (ref 3.95–5.11)
RBC # BLD: ABNORMAL 10*6/UL
SEG NEUTROPHILS: 59 % (ref 36–65)
SEGMENTED NEUTROPHILS ABSOLUTE COUNT: 2.98 K/UL (ref 1.5–8.1)
SODIUM BLD-SCNC: 129 MMOL/L (ref 135–144)
WBC # BLD: 5 K/UL (ref 3.5–11.3)
WBC # BLD: ABNORMAL 10*3/UL

## 2021-11-02 PROCEDURE — 85025 COMPLETE CBC W/AUTO DIFF WBC: CPT

## 2021-11-02 PROCEDURE — 6360000002 HC RX W HCPCS

## 2021-11-02 PROCEDURE — 2580000003 HC RX 258: Performed by: INTERNAL MEDICINE

## 2021-11-02 PROCEDURE — 6370000000 HC RX 637 (ALT 250 FOR IP)

## 2021-11-02 PROCEDURE — 99232 SBSQ HOSP IP/OBS MODERATE 35: CPT | Performed by: INTERNAL MEDICINE

## 2021-11-02 PROCEDURE — 97530 THERAPEUTIC ACTIVITIES: CPT

## 2021-11-02 PROCEDURE — 2580000003 HC RX 258

## 2021-11-02 PROCEDURE — 1200000000 HC SEMI PRIVATE

## 2021-11-02 PROCEDURE — 97110 THERAPEUTIC EXERCISES: CPT

## 2021-11-02 PROCEDURE — 80069 RENAL FUNCTION PANEL: CPT

## 2021-11-02 PROCEDURE — 6370000000 HC RX 637 (ALT 250 FOR IP): Performed by: NURSE PRACTITIONER

## 2021-11-02 PROCEDURE — 2700000000 HC OXYGEN THERAPY PER DAY

## 2021-11-02 PROCEDURE — 97535 SELF CARE MNGMENT TRAINING: CPT

## 2021-11-02 PROCEDURE — 6360000002 HC RX W HCPCS: Performed by: INTERNAL MEDICINE

## 2021-11-02 PROCEDURE — 36415 COLL VENOUS BLD VENIPUNCTURE: CPT

## 2021-11-02 PROCEDURE — 83735 ASSAY OF MAGNESIUM: CPT

## 2021-11-02 PROCEDURE — 6370000000 HC RX 637 (ALT 250 FOR IP): Performed by: INTERNAL MEDICINE

## 2021-11-02 PROCEDURE — 94761 N-INVAS EAR/PLS OXIMETRY MLT: CPT

## 2021-11-02 RX ADMIN — INSULIN LISPRO 2 UNITS: 100 INJECTION, SOLUTION INTRAVENOUS; SUBCUTANEOUS at 21:55

## 2021-11-02 RX ADMIN — CARVEDILOL 3.12 MG: 3.12 TABLET, FILM COATED ORAL at 16:46

## 2021-11-02 RX ADMIN — SODIUM CHLORIDE, PRESERVATIVE FREE 10 ML: 5 INJECTION INTRAVENOUS at 21:55

## 2021-11-02 RX ADMIN — OXYCODONE HYDROCHLORIDE AND ACETAMINOPHEN 2 TABLET: 5; 325 TABLET ORAL at 11:47

## 2021-11-02 RX ADMIN — OXYCODONE HYDROCHLORIDE AND ACETAMINOPHEN 2 TABLET: 5; 325 TABLET ORAL at 07:38

## 2021-11-02 RX ADMIN — OXYCODONE HYDROCHLORIDE AND ACETAMINOPHEN 2 TABLET: 5; 325 TABLET ORAL at 16:49

## 2021-11-02 RX ADMIN — METFORMIN HYDROCHLORIDE 1000 MG: 500 TABLET ORAL at 08:33

## 2021-11-02 RX ADMIN — AMLODIPINE BESYLATE 10 MG: 10 TABLET ORAL at 08:33

## 2021-11-02 RX ADMIN — FUROSEMIDE 40 MG: 40 TABLET ORAL at 08:33

## 2021-11-02 RX ADMIN — ENOXAPARIN SODIUM 30 MG: 30 INJECTION SUBCUTANEOUS at 21:55

## 2021-11-02 RX ADMIN — CARVEDILOL 3.12 MG: 3.12 TABLET, FILM COATED ORAL at 08:33

## 2021-11-02 RX ADMIN — SODIUM CHLORIDE, PRESERVATIVE FREE 10 ML: 5 INJECTION INTRAVENOUS at 08:35

## 2021-11-02 RX ADMIN — INSULIN GLARGINE 38 UNITS: 100 INJECTION, SOLUTION SUBCUTANEOUS at 21:55

## 2021-11-02 RX ADMIN — OXYCODONE HYDROCHLORIDE AND ACETAMINOPHEN 2 TABLET: 5; 325 TABLET ORAL at 03:40

## 2021-11-02 RX ADMIN — OXYCODONE HYDROCHLORIDE AND ACETAMINOPHEN 2 TABLET: 5; 325 TABLET ORAL at 21:56

## 2021-11-02 RX ADMIN — CEFTRIAXONE SODIUM 2000 MG: 2 INJECTION, POWDER, FOR SOLUTION INTRAMUSCULAR; INTRAVENOUS at 08:37

## 2021-11-02 RX ADMIN — INSULIN LISPRO 2 UNITS: 100 INJECTION, SOLUTION INTRAVENOUS; SUBCUTANEOUS at 16:47

## 2021-11-02 RX ADMIN — PANTOPRAZOLE SODIUM 40 MG: 40 TABLET, DELAYED RELEASE ORAL at 08:33

## 2021-11-02 RX ADMIN — METFORMIN HYDROCHLORIDE 1000 MG: 500 TABLET ORAL at 16:47

## 2021-11-02 RX ADMIN — ENOXAPARIN SODIUM 30 MG: 30 INJECTION SUBCUTANEOUS at 08:33

## 2021-11-02 RX ADMIN — IRON SUCROSE 300 MG: 20 INJECTION, SOLUTION INTRAVENOUS at 15:40

## 2021-11-02 ASSESSMENT — PAIN SCALES - GENERAL
PAINLEVEL_OUTOF10: 1
PAINLEVEL_OUTOF10: 5
PAINLEVEL_OUTOF10: 6
PAINLEVEL_OUTOF10: 6
PAINLEVEL_OUTOF10: 3
PAINLEVEL_OUTOF10: 6
PAINLEVEL_OUTOF10: 6
PAINLEVEL_OUTOF10: 2

## 2021-11-02 ASSESSMENT — PAIN DESCRIPTION - PAIN TYPE
TYPE: ACUTE PAIN;CHRONIC PAIN
TYPE: SURGICAL PAIN

## 2021-11-02 ASSESSMENT — PAIN DESCRIPTION - ORIENTATION
ORIENTATION: LEFT
ORIENTATION: LEFT

## 2021-11-02 ASSESSMENT — PAIN DESCRIPTION - LOCATION
LOCATION: KNEE
LOCATION: LEG

## 2021-11-02 NOTE — PROGRESS NOTES
[facility]  Office: [address], DO, Regina Press, DO, Vicki Cushing, DO, Christopher Meyers Blood, DO, Jason Gentile MD, Gregoria Presley MD, Demetria Sandoval MD, Mara Santana MD, Gregory Claudio MD, Sanjay Moreno MD, Shaista Jackson MD, Rachel Tomlinson MD, Karthikeyan Erickson, DO, aNthalie Desai, DO, Moncho Ward MD,  Gena Siemens, DO, Milton Galdamez MD, Fawn Albarran MD, Jessika Paulino MD, Wendy Garrido MD, Loida Acosta MD, Betty Moser MD, Ada Peterson, [facility], [facility], CNP, Franca Weiner, CNP, Lexie Horn, CNS, Mariam Daugherty, CNP, Lakisha Conti, CNP, Margareth Rangel, CNP, Sylvia Kahn, CNP, Prince Limon, CNP, Angela Presley, CNP, Roylene Goodell, PA-C, Jules Tesfaye, [facility], Jackie uJng, CNP, Parisa Ochoa, CNP, Ivelisse Costa, CNP, Elsa Lundberg, CNP, Brynn Purcell, CNP, Clive Salazar, [facility], [facility], [address] [place] [address]    Progress Note    11/2/2021    9:30 AM    Name:   Susana Hinojosa  MRN:     [id number]     Acct:      [de-identified]   Room:   [address] [place] [address] Day:  12  Admit Date:  10/21/2021 10:51 PM    PCP:   Memo Singer  Code Status:  Full Code    Subjective:     C/C: arthritis  Interval History Status: not changed. Patient seen and examined, no complaints, still waiting for restart    Brief History:     80 y.o. female being seen for a left knee periprosthetic joint infection. S/p knee explant, Irrigation/debridement, with placement of static antibiotic spacer and ortho fusion nail on 10/27. Started on R ocephin 2 gm IV every 24 hours  For one month followed by long term oral supression therapy. Needs follow up with Dr. Charlie Gilbert in 3 weeks. Fixing electrolytes and anemia (see below)    Currently waiting on SNF placement.      Review of Systems:     Constitutional:  negative for chills, fevers, sweats  Respiratory:  negative for cough, dyspnea on exertion, shortness of breath, wheezing  Cardiovascular:  negative for chest pain, chest pressure/discomfort, lower extremity edema, palpitations  Gastrointestinal:  negative for abdominal pain, constipation, diarrhea, nausea, vomiting  Neurological:  negative for dizziness, headache has some light headedness. EXT: knee pain is improving, admits to decreased range of motion but doing well. Medications: Allergies: Allergies   Allergen Reactions    Bactrim [Sulfamethoxazole-Trimethoprim]     Codeine     Lisinopril Other (See Comments)     cough       Current Meds:   Scheduled Meds:    iron sucrose  300 mg IntraVENous Q24H    insulin glargine  38 Units SubCUTAneous Nightly    furosemide  40 mg Oral Daily    metFORMIN  1,000 mg Oral BID WC    polyethylene glycol  17 g Oral Daily    enoxaparin  30 mg SubCUTAneous BID    amLODIPine  10 mg Oral Daily    carvedilol  3.125 mg Oral BID WC    sodium chloride flush  5-40 mL IntraVENous 2 times per day    sodium chloride flush  5-40 mL IntraVENous 2 times per day    [Held by provider] losartan  100 mg Oral Daily    pantoprazole  40 mg Oral QAM AC    insulin lispro  0-12 Units SubCUTAneous TID WC    cefTRIAXone (ROCEPHIN) IV  2,000 mg IntraVENous Q24H    insulin lispro  0-6 Units SubCUTAneous Nightly     Continuous Infusions:    dextrose       PRN Meds: oxyCODONE-acetaminophen, acetaminophen, glucose, dextrose, glucagon (rDNA), dextrose, ondansetron **OR** ondansetron, acetaminophen **OR** acetaminophen    Data:     Past Medical History:   has a past medical history of Arthritis, Diabetes mellitus ([address] Hyperlipidemia, and Hypertension. Social History:   reports that she has never smoked. She does not have any smokeless tobacco history on file. She reports that she does not drink alcohol and does not use drugs. Family History: No family history on file.     Vitals:  /67   Pulse 73   Temp 98 °F (36.7 °C) (Oral)   Resp 18   Ht 5' 4\" (1.626 m)   Wt 297 lb (134.7 kg)   SpO2 95%   BMI 50.98 kg/m² Temp (24hrs), Av.1 °F (36.7 °C), Min:98 °F (36.7 °C), Max:98.3 °F (36.8 °C)    Recent Labs     21  1618 21  2103 21  0641 21  0742   POCGLU 135* 142* 99 80       I/O (24Hr): Intake/Output Summary (Last 24 hours) at 2021 09  Last data filed at 2021 0641  Gross per 24 hour   Intake --   Output 1800 ml   Net -1800 ml       Labs:  Hematology:  Recent Labs     10/31/21  17221  0823 21  0500   WBC 7.8  --  6.0 5.0   RBC 2.88*  --  2.61* 2.68*   HGB 7.8*  --  7.1* 7.1*   HCT 26.8*  --  23.8* 24.1*   MCV 93.1  --  91.2 89.9   MCH 27.1  --  27.2 26.5   MCHC 29.1  --  29.8 29.5   RDW 14.8*  --  14.9* 15.0*     --  321 310   MPV 9.7  --  10.1 9.9   CRP  --  123.5*  --   --      Chemistry:  Recent Labs     10/30/21  1819 10/30/21  1819 10/30/21  2205 10/31/21  0720 10/31/21  1134 10/31/21  17221  0921  0500   *   < >  --   --   --  130* 131* 129*   K 5.8*   < >  --   --   --  5.4* 4.9 5.2   CL 98   < >  --   --   --  96* 95* 94*   CO2 18*   < >  --   --   --   23 24   GLUCOSE 205*   < >  --   --   --  153* 214* 110*   BUN 32*   < >  --   --   --  30* 31* 32*   CREATININE 1.10*   < >  --   --   --  1.13* 1.06* 1.19*   MG  --   --   --   --   --   --  1.6 1.8   ANIONGAP 11   < >  --   --   --  13 13 11   LABGLOM 49*   < >  --   --   --  48* 51* 45*   GFRAA 60*   < >  --   --   --  58* >60 54*   CALCIUM 8.1*   < >  --   --   --  8.3* 8.4* 8.2*   PHOS 3.7  --   --   --   --   --  5.1* 5.1*   TROPHS 19*   < > 19* 22* 21*  --   --   --     < > = values in this interval not displayed.      Recent Labs     10/31/21  1720 10/31/21  1923 21  0616 21  0927 21  1157 21  1618 21  2103 21  0500 21  0641 21  0742   PROT 7.1  --   --   --   --   --   --   --   --   --    LABALBU 2.2*  --   --  2.3*  --   --   --  2.3*  --   --    AST 27  --   --   --   --   --   --   --   --   --    ALT 18  -- --   --   --   --   --   --   --   --    ALKPHOS 297*  --   --   --   --   --   --   --   --   --    BILITOT 0.17*  --   --   --   --   --   --   --   --   --    POCGLU  --    < > 104  --  144* 135* 142*  --  99 80    < > = values in this interval not displayed. ABG:  Lab Results   Component Value Date    FIO2 INFORMATION NOT PROVIDED 10/30/2021     Lab Results   Component Value Date/Time    SPECIAL RT 4ML 10/21/2021 11:00 PM     Lab Results   Component Value Date/Time    CULTURE NO GROWTH 6 DAYS 10/21/2021 11:00 PM       Radiology:  XR FEMUR LEFT (MIN 2 VIEWS)    Result Date: 10/22/2021  1. No acute osseous abnormality in the left femur or tibia/fibula. 2.  Left knee arthroplasty in place without evidence for hardware loosening. 3.  Leg length measurements provided above. Please note limitations and landmarks used. XR KNEE LEFT (3 VIEWS)    Result Date: 10/22/2021  1. Soft tissue edema and effusion. 2. No acute osseous abnormality. XR TIBIA FIBULA LEFT (2 VIEWS)    Result Date: 10/22/2021  1. No acute osseous abnormality in the left femur or tibia/fibula. 2.  Left knee arthroplasty in place without evidence for hardware loosening. 3.  Leg length measurements provided above. Please note limitations and landmarks used. XR CHEST PORTABLE    Result Date: 10/22/2021  1. Aberrant positioning of the right upper extremity PICC line. Tip is either within the distal left subclavian vein, or directed into the azygous vein. Repositioning is recommended 2. Report was marked to be called to a licensed caregiver     NM Cardiac Stress Test Nuclear Imaging    Result Date: 10/25/2021  Perfusion:  Stress-induced reversible defects as above involving 12% of the left ventricular myocardium at stress. Function:  Normal left ventricular ejection fraction of 55%. Mild perfusion defect in the apex and mid inferior wall. Risk stratification: HIGH DUE TO PERFUSION DEFECTS.  Notes concerning risk stratification: Risk stratification incorporates both clinical history and some testing results. Final risk determination is the responsibility of the ordering provider as other patient information and test results may increase or decrease the risk assessment reported for this examination. Risk stratification criteria are adapted from \"Noninvasive Risk Stratification\" criteria from Francesca Matias. Al, ACC/AATS/AHA/ASE/ASNC/SCAI/SCCT/STS 2017 Appropriate Use Criteria For Coronary Revascularization in Patients With Stable Ischemic Heart Disease [facility] Volume 69, Issue 17, May 2017 High risk (>3% annual death or MI) 1. Severe resting LV dysfunction (LVEF <35%) not readily explained by non coronary causes 2. Resting perfusion abnormalities greater than 10% of the myocardium in patients without prior history or evidence of MI3. Stress-induced perfusion abnormalities encumbering greater than or equal to 10% myocardium or stress segmental scores indicating multiple vascular territories with abnormalities 4. Stress-induced LV dilatation (TID ratio greater than 1.19 for exercise and greater than 1.39 for regadenoson) Intermediate risk (1% to 3% annual death or MI) 1. Mild/moderate resting LV dysfunction (LVEF 35% to 49%) not readily explained by non coronary causes. 2. Resting perfusion abnormalities in 5%-9.9% of the myocardium in patients without a history or prior evidence of MI 3. Stress-induced perfusion abnormality encumbering 5%-9.9% of the myocardium or stress segmental scores indicating 1 vascular territory with abnormalities but without LV dilation 4. Small wall motion abnormality involving 1-2 segments and only 1 coronary bed. Low Risk (Less than 1% annual death or MI) 1. Normal or small myocardial perfusion defect at rest or with stress encumbering less than 5% of the myocardium. Physical Examination:        General appearance:  alert, cooperative and no distress, obese female.    Mental Status:  oriented to person, place and time and normal affect  Lungs:  clear to auscultation bilaterally, normal effort  Heart:  regular rate and rhythm, no murmur  Abdomen:  soft, nontender, nondistended, normal bowel sounds, no masses, hepatomegaly, splenomegaly  Extremities:  no edema, redness, tenderness in the calves  Skin:  no gross lesions, rashes, induration. Knee incision c/d/i, drain in place. Assessment:        Hospital Problems         Last Modified POA    * (Principal) Staphylococcal arthritis of left knee ([address] 10/22/2021 Yes    Type 2 diabetes mellitus without complication, without long-term current use of insulin ([address] 10/22/2021 Yes    PITA on CPAP 10/22/2021 Yes    CAD (coronary artery disease) 10/22/2021 Yes    COPD (chronic obstructive pulmonary disease) ([address] 10/22/2021 Yes    Primary hypertension 10/22/2021 Yes    Acute kidney injury superimposed on CKD ([address] 10/22/2021 Yes    Normocytic normochromic anemia 10/22/2021 Yes          Plan:        Left knee periprosthetic joint infection: S/p knee explant, Irrigation/debridement, with placement of static antibiotic spacer and ortho fusion nail on 10/27. Continue Rocephin 2 gm IV every 24 hours  For one month followed by long term oral supression therapy. Midline placed. Needs follow up with Dr. Fady Perez in 3 weeks. Blood cultures NGDT . Hyperk- hold cozaar, normotensive. Caution with restarting   Post operative anemia: 2/2 blood loss anemia +/-Functional NOY. Received one unit of PRBC on 10/28 for anemia, tachycardia and hgb of 6.8. Tsat: 9, Ferritin 372. Since blood cultures negative and infection clearing, will start IV iron while waiting on placement, may be discharged on PO when bed available. Constipation: continue bowel regimen. Continue polyethylene glycol. Hyponatremia: 2/2 osmotic drag from hyperglycemia with component of SIADH due to pain/sepsis. Urine sodium 63, urine osm 403. Diabetes mellitus type II with Hyperglycema:  Lantus 38 units and restarted Metformin.   QHS, ISS  Morbid Obesity BMI of 51: recommend weight loss and lifestyle modification  Uncontrolled Hypertension-improving: continue home antihypertensive regimen. Continue Coreg 3.125, Norvasc  Suspected PITA: needs outpt sleep study  DVT ppx  PT/OT    Dispo: Waiting on SNF placement.         Cameron Muller DO  11/2/2021  9:30 AM

## 2021-11-02 NOTE — PROGRESS NOTES
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 Stop date 11-20-21)  Long term oral antibiotic suppression following the above  Will need midline for IV antibiotics  Orthopedic surgery following-  S/p  explantation and antibiotic spacer device placement 10-26-21. Office f/up in 3 weeks with Dr Guera Cooley for infection. Please call [phone] for appointment     Medical Decision Making/Summary/Discussion:11/1/2021       Infection Control Recommendations   [place] Precautions      Antimicrobial Stewardship Recommendations     Simplification of therapy    Coordination of Outpatient Care:   Estimated Length of IV antimicrobials:4 weeks  Patient will need Midline Catheter Insertion: Yes  Patient will need PICC line Insertion:No  Patient will need: Home IV , [place],  SNF,  LTAC: TBD  Patient will need outpatient wound care:Yes    Chief complaint/reason for consultation:   Lt knee pyogenic arthritis      History of Present Illness:   José Antonio Grande is a 80y.o.-year-old female who was initially admitted on 10/21/2021. Patient seen at the request of Dr. Jasen Vazquez. INITIAL HISTORY:    Patient with a Hx of prior Lt TKA  In 2007 at [facility] under Dr Gena Taveras, She subsequently experienced  Lt knee infections which required additional surgeries x 2. She has underlying DM 2, Essential HTN, CKD,PITA and COPD. Presented to [facility] because of acute onset of pain with ambulation. The knee joint was aspirated and grew Strep. Agalactiae.  She was started on antibiotics (ceftriaxone) and was awaiting evaluation at tertiary care center because of her Hx of prior surgeries and infections. [facility] refused transfer. She presented to [facility] because of ongoing severe pain, erythema and inflammation. Ortho has evaluated and plan intervention on 10-25-21. CURRENT EVALUATION : 11/1/2021    Afebrile  VS stable    Had pre-op evaluation by Cardiology including stress test.  Patient had positive stress test.   S/p left heart cath 10/26: normal coronary arteries, preserved LV systolic function    Orthopedic Surgery performed  Left knee I&D with fusion nail on 10/26/21 explant/spacer placement. Patient has been doing well post-op. Received 1 unit pRBC on 10/28/21 for Hgb 6.8     Denies any fever, chills, nausea, vomiting  She will need IV ceftriaxone x 4 weeks and then long term oral antibiotic suppression. Labs, X rays reviewed: 11/1/2021    BUN: 40-->22-->29-->30  Cr: 1.57-->0.98-->1.15-->1.08-->1.13    WBC: 7.8-->11.7-->8.2-->7.8  Hb:8.9-->7.5-->6.8-->7.2-->7.8  Plat: 269-->278-->263-->337    Cultures:  Urine:    Blood:  10/21/21: no growth  Sputum :    Wound:  Knee aspirate: Strep agalactiae      COVID rapid 10/22/21: negative    Discussed with patient, RN, IM. I have personally reviewed the past medical history, past surgical history, medications, social history, and family history, and I have updated the database accordingly.   Past Medical History:     Past Medical History:   Diagnosis Date    Arthritis     Diabetes mellitus ([address]     Hyperlipidemia     Hypertension        Past Surgical  History:     Past Surgical History:   Procedure Laterality Date    HYSTERECTOMY      KNEE SURGERY Left 10/26/2021    KNEE EXPLANT, IRRIGATION & DEBRIDEMENT, STATIC ANTIBIOTIC SPACER, LINK ORTHO FUSION NAIL SET    REVISION TOTAL KNEE ARTHROPLASTY Left 10/26/2021    KNEE EXPLANT, IRRIGATION & DEBRIDEMENT, STATIC ANTIBIOTIC SPACER, LINK ORTHO FUSION NAIL SET- performed by Pepe Trevino Ruy Burnham, DO at [facility] OR       Medications:      iron sucrose  300 mg IntraVENous Q24H    insulin glargine  38 Units SubCUTAneous Nightly    furosemide  40 mg Oral Daily    sodium zirconium cyclosilicate  10 g Oral TID    metFORMIN  1,000 mg Oral BID WC    polyethylene glycol  17 g Oral Daily    enoxaparin  30 mg SubCUTAneous BID    amLODIPine  10 mg Oral Daily    carvedilol  3.125 mg Oral BID WC    sodium chloride flush  5-40 mL IntraVENous 2 times per day    sodium chloride flush  5-40 mL IntraVENous 2 times per day    [Held by provider] losartan  100 mg Oral Daily    pantoprazole  40 mg Oral QAM AC    insulin lispro  0-12 Units SubCUTAneous TID WC    cefTRIAXone (ROCEPHIN) IV  2,000 mg IntraVENous Q24H    insulin lispro  0-6 Units SubCUTAneous Nightly       Social History:     Social History     Socioeconomic History    Marital status:      Spouse name: Not on file    Number of children: Not on file    Years of education: Not on file    Highest education level: Not on file   Occupational History    Not on file   Tobacco Use    Smoking status: Never Smoker   Substance and Sexual Activity    Alcohol use: No    Drug use: No    Sexual activity: Not on file   Other Topics Concern    Not on file   Social History Narrative    Not on file     Social Determinants of Health     Financial Resource Strain:     Difficulty of Paying Living Expenses:    Food Insecurity:     Worried About Running Out of Food in the Last Year:     [address] in the Last Year:    Transportation Needs:     Lack of Transportation (Medical):      Lack of Transportation (Non-Medical):    Physical Activity:     Days of Exercise per Week:     Minutes of Exercise per Session:    Stress:     Feeling of Stress :    Social Connections:     Frequency of Communication with Friends and Family:     Frequency of Social Gatherings with Friends and Family:     Attends Gnosticism Services:     Active Member of Clubs or Organizations:     Attends Club or Organization Meetings:     Marital Status:    Intimate Partner Violence:     Fear of Current or Ex-Partner:     Emotionally Abused:     Physically Abused:     Sexually Abused:        Family History:   No family history on file. Allergies:   Bactrim [sulfamethoxazole-trimethoprim], Codeine, and Lisinopril     Review of Systems:   Constitutional: No fevers or chills. No systemic complaints  Head: No headaches  Eyes: No double vision or blurry vision. No conjunctival inflammation. ENT: No sore throat or runny nose. . No hearing loss, tinnitus or vertigo. Cardiovascular: No chest pain or palpitations. No shortness of breath. No FRANCES  Lung: No shortness of breath or cough. No sputum production  Abdomen: No nausea, vomiting, diarrhea, or abdominal pain. [employer] No cramps. Genitourinary: No increased urinary frequency, or dysuria. No hematuria. No suprapubic or CVA pain  Musculoskeletal: No muscle aches or pains. No joint effusions, swelling or deformities. Lt knee effusion  Hematologic: No bleeding or bruising. Neurologic: No headache, weakness, numbness, or tingling. Integument: No rash, no ulcers. Lt knee erythema  Psychiatric: No depression. Endocrine: No polyuria, no polydipsia, no polyphagia. Physical Examination :     Patient Vitals for the past 8 hrs:   BP Pulse   11/01/21 1658 (!) 140/61 81     General Appearance: Awake, alert, and in no apparent distress  Head:  Normocephalic, no trauma  Eyes: Pupils equal, round, reactive to light and accommodation; extraocular movements intact; sclera anicteric; conjunctivae pink. No embolic phenomena. ENT: Oropharynx clear, without erythema, exudate, or thrush. No tenderness of sinuses. Mouth/throat: mucosa pink and moist. No lesions. Dentition in good repair. Neck:Supple, without lymphadenopathy. Thyroid normal, No bruits. Pulmonary/Chest: Clear to auscultation, without wheezes, rales, or rhonchi.   No dullness to percussion. Cardiovascular: Regular rate and rhythm without murmurs, rubs, or gallops. Abdomen: Soft, non tender. Bowel sounds normal. No organomegaly  All four Extremities: No cyanosis, clubbing, edema. Lt knee effusion and erythema. Neurologic: No gross sensory or motor deficits. Skin: Warm and dry with good turgor. No signs of peripheral arterial or venous insufficiency. No ulcerations. No open wounds. Medical Decision Making -Laboratory:   I have independently reviewed/ordered the following labs:    CBC with Differential:   Recent Labs     10/31/21  1720 11/01/21  0927   WBC 7.8 6.0   HGB 7.8* 7.1*   HCT 26.8* 23.8*    321   LYMPHOPCT 19* 22*   MONOPCT 7 10     BMP:   Recent Labs     10/31/21  1720 11/01/21  0927   * 131*   K 5.4* 4.9   CL 96* 95*   CO2 21 23   BUN 30* 31*   CREATININE 1.13* 1.06*   MG  --  1.6     Hepatic Function Panel:   Recent Labs     10/31/21  1720 11/01/21  0927   PROT 7.1  --    LABALBU 2.2* 2.3*   BILITOT 0.17*  --    ALKPHOS 297*  --    ALT 18  --    AST 27  --      No results for input(s): RPR in the last 72 hours. No results for input(s): HIV in the last 72 hours. No results for input(s): BC in the last 72 hours. Lab Results   Component Value Date    MUCUS NOT REPORTED 10/22/2021    RBC 2.61 11/01/2021    TRICHOMONAS NOT REPORTED 10/22/2021    WBC 6.0 11/01/2021    YEAST NOT REPORTED 10/22/2021    TURBIDITY Clear 10/22/2021     Lab Results   Component Value Date    CREATININE 1.06 11/01/2021    GLUCOSE 214 11/01/2021       Medical Decision Making-Imaging:     EXAMINATION:   2 X-RAY VIEWS OF THE LEFT TIBIA AND FIBULA; 2 X-RAY VIEWS OF THE LEFT FEMUR. RULER WAS USED.       10/22/2021 9:46 am       COMPARISON:   Left knee radiographs dated 10/21/2021       HISTORY:   ORDERING SYSTEM PROVIDED HISTORY: leg length eval   TECHNOLOGIST PROVIDED HISTORY:   leg length eval   Reason for Exam: leg length eval; Best possible images at this time due to pt   condition . -JEK/GW       FINDINGS:   Left femur: Left femoral head is not visualized due to overlying soft tissue   attenuation.  The superior tip of the greater trochanter is used as a   landmark.  Femoral length from the superior tip of the greater trochanter to   the medial femoral condyle is approximately 39.2 cm.       Left total knee arthroplasty is in place.  No acute fracture of the left   femur.  No evidence of hardware loosening.       Left leg: Distance from the medial femoral condyle to the mid tibial plafond   is 36 cm, which includes the polyethylene liner in the knee arthroplasty. (Total leg length of approximately 75.2 cm from superior tip of greater   trochanter to mid tibial plafond).  The liner measures up to 2 cm in   thickness.       No acute fracture involving the tibia or fibula.  No periprosthetic lucency   in the proximal tibia.  Ankle mortise alignment is preserved.  Scattered   dystrophic calcifications in the leg.           Impression   1.  No acute osseous abnormality in the left femur or tibia/fibula.       2.  Left knee arthroplasty in place without evidence for hardware loosening.       3.  Leg length measurements provided above.  Please note limitations and   landmarks used.         CT extremity lower left with contrast    Result Date: 10/18/2021  History: Acute left lower leg and ankle redness Exam/Technique: Thin axial images through the left lower extremity were obtained from the superior aspect of the acetabulum to the left foot following the intravenous demonstration of 100 mL Omnipaque 300. Study was supplemented by sagittal and coronal reconstructed images. Comparison: None Findings: There is a left knee arthroplasty in place. Full evaluation of the area of the knee is compromised by extensive metallic artifact. There is a joint effusion of the left knee with fluid seen in the supra patellar bursa. There is no evidence for an acute osseous abnormality.  No lytic or blastic processes are seen. No evidence of osteolysis demonstrated. No soft tissue masses or fluid collections are identified. IMPRESSION: 1. Left knee joint effusion with a left knee arthroplasty in place. 2. Otherwise normal CT of the left lower extremity. Workstation:[id number] Finalized by Anya Gomez MD on 10/18/2021     Medical Decision Styjdk-[id number]-Xpqug:   Microbiology Results   Procedure Component Value Units Date/Time   Body fluid culture includes gram stain [[id number]] (Abnormal) Collected: 10/18/21 1210   Specimen: Fluid Updated: 10/19/21 1252   Gram Stain Result WHITE BLOOD CELLS PRESENT   FEW GRAM POSITIVE COCCI   QUANTITY NOT SUFFICIENT TO CONCENTRATE INTERPRET RESULTS WITH CAUTION   A Negative report does not exclude the possibility of infection because results are dependent on adequate specimen collection. Culture RARE STREPTOCOCCUS AGALACTIAE (GROUP B)   SARS COV 2 (COVID-19) [facility] ID Onsite Test [[id number]] Collected: 10/18/21 0253   Specimen: Nasopharynx Updated: 10/18/21 0335   Specimen Naso Pharynx   Sent to Testing to be performed at [address] [place] [address]. COVID-19 [facility] Report to Follow. SARS CoV 2 [[id number]] Collected: 10/18/21 0253   Specimen: Nasopharynx Updated: 10/18/21 0336   First Test? UNKNOWN   Employed in Healthcare? UNKNOWN   Symptoms Defined by CDC? NO   Symptom Onset? U^UNKNOWN   Hospitalized for Covid? UNKNOWN   ICU for Covid? UNKNOWN   Congregate Setting? UNKNOWN   Pregnant? NO   Specimen Type Naso Pharynx   SARS COV 2 Presumptive Negative     Medical Decision Making-Other:     Note:  Labs, medications, radiologic studies were reviewed with personal review of films  Large amounts of data were reviewed  Discussed with nursing Staff, Discharge planner  Infection Control and Prevention measures reviewed  All prior entries were reviewed  Administer medications as ordered  Prognosis: Guarded  Discharge planning reviewed  Follow up as outpatient.     Thank you for allowing us to participate in the care of this patient. Please call with questions. Angelika Ingram MD, PGY-1  Infectious Disease/ Internal Medicine Resident  [address], [place]      ATTESTATION:    I have discussed the case, including pertinent history and exam findings with the residents and students. I have seen and examined the patient and the key elements of the encounter have been performed by me. I was present when the student obtained his information or examined the patient. I have reviewed the laboratory data, other diagnostic studies and discussed them with the residents. I have updated the medical record where necessary. I agree with the assessment, plan and orders as documented by the resident/ student.     Sanjiv Higginbotham MD.    Pager: [phone] - Office: [phone]

## 2021-11-02 NOTE — PROGRESS NOTES
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 Stop date 11-20-21)  Long term oral antibiotic suppression following the above  Will need midline for IV antibiotics  Orthopedic surgery following-  S/p  explantation and antibiotic spacer device placement 10-26-21. Office f/up in 3 weeks with Dr Onofre Hernandez for infection. Please call [phone] for appointment     Medical Decision Making/Summary/Discussion:11/2/2021       Infection Control Recommendations   [place] Precautions      Antimicrobial Stewardship Recommendations     Simplification of therapy    Coordination of Outpatient Care:   Estimated Length of IV antimicrobials:4 weeks  Patient will need Midline Catheter Insertion: Yes  Patient will need PICC line Insertion:No  Patient will need: Home IV , [place],  SNF,  LTAC: TBD  Patient will need outpatient wound care:Yes    Chief complaint/reason for consultation:   Lt knee pyogenic arthritis      History of Present Illness:   Junie Bui is a 80y.o.-year-old female who was initially admitted on 10/21/2021. Patient seen at the request of Dr. Estevan Mcnair. INITIAL HISTORY:    Patient with a Hx of prior Lt TKA  In 2007 at [facility] under Dr Carolann Briscoe, She subsequently experienced  Lt knee infections which required additional surgeries x 2. She has underlying DM 2, Essential HTN, CKD,PITA and COPD. Presented to [facility] because of acute onset of pain with ambulation. The knee joint was aspirated and grew Strep. Agalactiae.  She was started on antibiotics (ceftriaxone) and was awaiting evaluation at tertiary care center because of her Hx of prior surgeries and infections. [facility] refused transfer. She presented to [facility] because of ongoing severe pain, erythema and inflammation. Ortho has evaluated and plan intervention on 10-25-21. CURRENT EVALUATION : 11/2/2021    Afebrile  VS stable    Had pre-op evaluation by Cardiology including stress test.  Patient had positive stress test.   S/p left heart cath 10/26: normal coronary arteries, preserved LV systolic function    Orthopedic Surgery performed  Left knee I&D with fusion nail on 10/26/21 explant/spacer placement. Patient has been doing well post-op. Awaiting SNF placement to continue antibiotic treatment and rehab. Denies any fever, chills, nausea, vomiting  She will need IV ceftriaxone x 4 weeks and then long term oral antibiotic suppression. Labs, X rays reviewed: 11/2/2021    BUN: 40-->22-->29-->32  Cr: 1.57-->1.15-->1.08-->1.19    WBC: 11.7-->8.2-->7.8-->5.0  Hb: 6.8-->7.2-->7.8-->7.1  Plat: 269-->278-->263-->337--->310    Cultures:  Urine:    Blood:  10/21/21: no growth  Sputum :    Wound:  Knee aspirate: Strep agalactiae      COVID rapid 10/22/21: negative    Discussed with patient, RN, IM. I have personally reviewed the past medical history, past surgical history, medications, social history, and family history, and I have updated the database accordingly.   Past Medical History:     Past Medical History:   Diagnosis Date    Arthritis     Diabetes mellitus ([address]     Hyperlipidemia     Hypertension        Past Surgical  History:     Past Surgical History:   Procedure Laterality Date    HYSTERECTOMY      KNEE SURGERY Left 10/26/2021    KNEE EXPLANT, IRRIGATION & DEBRIDEMENT, STATIC ANTIBIOTIC SPACER, LINK ORTHO FUSION NAIL SET    REVISION TOTAL KNEE ARTHROPLASTY Left 10/26/2021    KNEE EXPLANT, IRRIGATION & DEBRIDEMENT, STATIC ANTIBIOTIC SPACER, LINK ORTHO FUSION NAIL SET- performed by Surya Turner DO at [facility] OR       Medications:      iron sucrose  300 mg IntraVENous Q24H    insulin glargine  38 Units SubCUTAneous Nightly    furosemide  40 mg Oral Daily    metFORMIN  1,000 mg Oral BID WC    polyethylene glycol  17 g Oral Daily    enoxaparin  30 mg SubCUTAneous BID    amLODIPine  10 mg Oral Daily    carvedilol  3.125 mg Oral BID WC    sodium chloride flush  5-40 mL IntraVENous 2 times per day    sodium chloride flush  5-40 mL IntraVENous 2 times per day    [Held by provider] losartan  100 mg Oral Daily    pantoprazole  40 mg Oral QAM AC    insulin lispro  0-12 Units SubCUTAneous TID WC    cefTRIAXone (ROCEPHIN) IV  2,000 mg IntraVENous Q24H    insulin lispro  0-6 Units SubCUTAneous Nightly       Social History:     Social History     Socioeconomic History    Marital status:      Spouse name: Not on file    Number of children: Not on file    Years of education: Not on file    Highest education level: Not on file   Occupational History    Not on file   Tobacco Use    Smoking status: Never Smoker   Substance and Sexual Activity    Alcohol use: No    Drug use: No    Sexual activity: Not on file   Other Topics Concern    Not on file   Social History Narrative    Not on file     Social Determinants of Health     Financial Resource Strain:     Difficulty of Paying Living Expenses:    Food Insecurity:     Worried About Running Out of Food in the Last Year:     [address] in the Last Year:    Transportation Needs:     Lack of Transportation (Medical):      Lack of Transportation (Non-Medical):    Physical Activity:     Days of Exercise per Week:     Minutes of Exercise per Session:    Stress:     Feeling of Stress :    Social Connections:     Frequency of Communication with Friends and Family:     Frequency of Social Gatherings with Friends and Family:     Attends Confucianist Services:     Active Member of Clubs or Organizations:     Attends Club or Organization Meetings:     Marital Status:    Intimate Partner Violence:     Fear of Current or Ex-Partner:     Emotionally Abused:     Physically Abused:     Sexually Abused:        Family History:   No family history on file. Allergies:   Bactrim [sulfamethoxazole-trimethoprim], Codeine, and Lisinopril     Review of Systems:   Constitutional: No fevers or chills. No systemic complaints  Head: No headaches  Eyes: No double vision or blurry vision. No conjunctival inflammation. ENT: No sore throat or runny nose. . No hearing loss, tinnitus or vertigo. Cardiovascular: No chest pain or palpitations. No shortness of breath. No FRANCES  Lung: No shortness of breath or cough. No sputum production  Abdomen: No nausea, vomiting, diarrhea, or abdominal pain. Hermann Boulder No cramps. Genitourinary: No increased urinary frequency, or dysuria. No hematuria. No suprapubic or CVA pain  Musculoskeletal: No muscle aches or pains. No joint effusions, swelling or deformities. Lt knee effusion  Hematologic: No bleeding or bruising. Neurologic: No headache, weakness, numbness, or tingling. Integument: No rash, no ulcers. Lt knee erythema  Psychiatric: No depression. Endocrine: No polyuria, no polydipsia, no polyphagia. Physical Examination :     Patient Vitals for the past 8 hrs:   BP Temp Temp src Pulse SpO2   11/02/21 0638 131/67 98 °F (36.7 °C) Oral 73 95 %     General Appearance: Awake, alert, and in no apparent distress  Head:  Normocephalic, no trauma  Eyes: Pupils equal, round, reactive to light and accommodation; extraocular movements intact; sclera anicteric; conjunctivae pink. No embolic phenomena. ENT: Oropharynx clear, without erythema, exudate, or thrush. No tenderness of sinuses. Mouth/throat: mucosa pink and moist. No lesions. Dentition in good repair. Neck:Supple, without lymphadenopathy. Thyroid normal, No bruits. Pulmonary/Chest: Clear to auscultation, without wheezes, rales, or rhonchi.   No dullness to percussion. Cardiovascular: Regular rate and rhythm without murmurs, rubs, or gallops. Abdomen: Soft, non tender. Bowel sounds normal. No organomegaly  All four Extremities: No cyanosis, clubbing, edema. Lt knee effusion and erythema. Neurologic: No gross sensory or motor deficits. Skin: Warm and dry with good turgor. No signs of peripheral arterial or venous insufficiency. No ulcerations. No open wounds. Medical Decision Making -Laboratory:   I have independently reviewed/ordered the following labs:    CBC with Differential:   Recent Labs     11/01/21 0927 11/02/21  0500   WBC 6.0 5.0   HGB 7.1* 7.1*   HCT 23.8* 24.1*    310   LYMPHOPCT 22* 25   MONOPCT 10 10     BMP:   Recent Labs     11/01/21 0927 11/02/21  0500   * 129*   K 4.9 5.2   CL 95* 94*   CO2 23 24   BUN 31* 32*   CREATININE 1.06* 1.19*   MG 1.6 1.8     Hepatic Function Panel:   Recent Labs     10/31/21  1720 10/31/21  1720 11/01/21 0927 11/02/21  0500   PROT 7.1  --   --   --    LABALBU 2.2*   < > 2.3* 2.3*   BILITOT 0.17*  --   --   --    ALKPHOS 297*  --   --   --    ALT 18  --   --   --    AST 27  --   --   --     < > = values in this interval not displayed. No results for input(s): RPR in the last 72 hours. No results for input(s): HIV in the last 72 hours. No results for input(s): BC in the last 72 hours. Lab Results   Component Value Date    MUCUS NOT REPORTED 10/22/2021    RBC 2.68 11/02/2021    TRICHOMONAS NOT REPORTED 10/22/2021    WBC 5.0 11/02/2021    YEAST NOT REPORTED 10/22/2021    TURBIDITY Clear 10/22/2021     Lab Results   Component Value Date    CREATININE 1.19 11/02/2021    GLUCOSE 110 11/02/2021       Medical Decision Making-Imaging:     EXAMINATION:   2 X-RAY VIEWS OF THE LEFT TIBIA AND FIBULA; 2 X-RAY VIEWS OF THE LEFT FEMUR.    RULER WAS USED.       10/22/2021 9:46 am       COMPARISON:   Left knee radiographs dated 10/21/2021       HISTORY:   ORDERING SYSTEM PROVIDED HISTORY: leg length eval   TECHNOLOGIST PROVIDED HISTORY:   leg length eval   Reason for Exam: leg length eval; Best possible images at this time due to pt   condition . -JEK/GW       FINDINGS:   Left femur: Left femoral head is not visualized due to overlying soft tissue   attenuation.  The superior tip of the greater trochanter is used as a   landmark.  Femoral length from the superior tip of the greater trochanter to   the medial femoral condyle is approximately 39.2 cm.       Left total knee arthroplasty is in place.  No acute fracture of the left   femur.  No evidence of hardware loosening.       Left leg: Distance from the medial femoral condyle to the mid tibial plafond   is 36 cm, which includes the polyethylene liner in the knee arthroplasty. (Total leg length of approximately 75.2 cm from superior tip of greater   trochanter to mid tibial plafond).  The liner measures up to 2 cm in   thickness.       No acute fracture involving the tibia or fibula.  No periprosthetic lucency   in the proximal tibia.  Ankle mortise alignment is preserved.  Scattered   dystrophic calcifications in the leg.           Impression   1.  No acute osseous abnormality in the left femur or tibia/fibula.       2.  Left knee arthroplasty in place without evidence for hardware loosening.       3.  Leg length measurements provided above.  Please note limitations and   landmarks used.         CT extremity lower left with contrast    Result Date: 10/18/2021  History: Acute left lower leg and ankle redness Exam/Technique: Thin axial images through the left lower extremity were obtained from the superior aspect of the acetabulum to the left foot following the intravenous demonstration of 100 mL Omnipaque 300. Study was supplemented by sagittal and coronal reconstructed images. Comparison: None Findings: There is a left knee arthroplasty in place. Full evaluation of the area of the knee is compromised by extensive metallic artifact.  There is a joint effusion of the left knee with fluid seen in the supra patellar bursa. There is no evidence for an acute osseous abnormality. No lytic or blastic processes are seen. No evidence of osteolysis demonstrated. No soft tissue masses or fluid collections are identified. IMPRESSION: 1. Left knee joint effusion with a left knee arthroplasty in place. 2. Otherwise normal CT of the left lower extremity. Workstation:[id number] Finalized by Cristina Webb MD on 10/18/2021     Medical Decision Soryut-Zpocvbjg-Lwwol:   Microbiology Results   Procedure Component Value Units Date/Time   Body fluid culture includes gram stain [[id number]] (Abnormal) Collected: 10/18/21 1210   Specimen: Fluid Updated: 10/19/21 1252   Gram Stain Result WHITE BLOOD CELLS PRESENT   FEW GRAM POSITIVE COCCI   QUANTITY NOT SUFFICIENT TO CONCENTRATE INTERPRET RESULTS WITH CAUTION   A Negative report does not exclude the possibility of infection because results are dependent on adequate specimen collection. Culture RARE STREPTOCOCCUS AGALACTIAE (GROUP B)   SARS COV 2 (COVID-19) [facility] ID Onsite Test [[id number]] Collected: 10/18/21 0253   Specimen: Nasopharynx Updated: 10/18/21 0335   Specimen Naso Pharynx   Sent to Testing to be performed at [address] [place] [address]. COVID-19 [facility] Labs Report to Follow. SARS CoV 2 [[id number]] Collected: 10/18/21 0253   Specimen: Nasopharynx Updated: 10/18/21 0336   First Test? UNKNOWN   Employed in Healthcare? UNKNOWN   Symptoms Defined by CDC? NO   Symptom Onset? U^UNKNOWN   Hospitalized for Covid? UNKNOWN   ICU for Covid? UNKNOWN   Congregate Setting? UNKNOWN   Pregnant?  NO   Specimen Type Naso Pharynx   SARS COV 2 Presumptive Negative     Medical Decision Making-Other:     Note:  Labs, medications, radiologic studies were reviewed with personal review of films  Large amounts of data were reviewed  Discussed with nursing Staff, Discharge planner  Infection Control and Prevention measures reviewed  All prior entries were

## 2021-11-02 NOTE — PLAN OF CARE
Problem: Pain:  Goal: Pain level will decrease  Description: Pain level will decrease  11/2/2021 1804 by Sheyla Rod RN  Outcome: Ongoing  11/2/2021 0459 by Monserrat Combs RN  Outcome: Ongoing  Goal: Control of acute pain  Description: Control of acute pain  11/2/2021 1804 by Sheyla Rod RN  Outcome: Ongoing  11/2/2021 0459 by Monserrat Combs RN  Outcome: Ongoing  Goal: Control of chronic pain  Description: Control of chronic pain  11/2/2021 1804 by Sheyla Rod RN  Outcome: Ongoing  11/2/2021 0459 by Monserrat Combs RN  Outcome: Ongoing     Problem: Falls - Risk of:  Goal: Will remain free from falls  Description: Will remain free from falls  11/2/2021 1804 by Sheyla Rod RN  Outcome: Ongoing  11/2/2021 0459 by Monserrat Combs RN  Outcome: Ongoing  Goal: Absence of physical injury  Description: Absence of physical injury  11/2/2021 1804 by Sheyla Rod RN  Outcome: Ongoing  11/2/2021 0459 by Monserrat Combs RN  Outcome: Ongoing     Problem: Skin Integrity:  Goal: Will show no infection signs and symptoms  Description: Will show no infection signs and symptoms  11/2/2021 1804 by Sheyla Rod RN  Outcome: Ongoing  11/2/2021 0459 by Monserrat Combs RN  Outcome: Ongoing  Goal: Absence of new skin breakdown  Description: Absence of new skin breakdown  11/2/2021 1804 by Sheyla Rod RN  Outcome: Ongoing  11/2/2021 0459 by Monserrat Combs RN  Outcome: Ongoing     Problem: Musculor/Skeletal Functional Status  Goal: Highest potential functional level  11/2/2021 1804 by Sheyla Rod RN  Outcome: Ongoing  11/2/2021 0459 by Monserrat Combs RN  Outcome: Ongoing     Problem: Nutrition  Goal: Optimal nutrition therapy  11/2/2021 1804 by Sheyla Rod RN  Outcome: Ongoing  11/2/2021 0459 by Monserrat Combs RN  Outcome: Ongoing

## 2021-11-02 NOTE — PROGRESS NOTES
Physical Therapy  Facility/Department: [address] ORTHO/MED SURG  Daily Treatment Note  NAME: Sergei Dunner  : 1950  MRN: [id number]    Date of Service: 2021    Discharge Recommendations:  Patient would benefit from continued therapy after discharge    Assessment   Body structures, Functions, Activity limitations: Decreased functional mobility ; Decreased strength;Decreased endurance  Assessment: Pt required Mod A for bed mobs , Mod A x2 sit to stand with SS. Motivated to improve, pleasant and cooperative to work with. Recommend continued PT after d/c to address deficitss  Prognosis: Good  PT Education: Goals;Plan of Care;General Safety;Gait Training;Transfer Training;Functional Mobility Training  REQUIRES PT FOLLOW UP: Yes  Activity Tolerance  Activity Tolerance: Patient limited by fatigue;Patient limited by pain; Patient limited by endurance     Patient Diagnosis(es): There were no encounter diagnoses. has a past medical history of Arthritis, Diabetes mellitus ([facility] [address] Hyperlipidemia, and Hypertension. has a past surgical history that includes Hysterectomy; knee surgery (Left, 10/26/2021); and Revision total knee arthroplasty (Left, 10/26/2021). Restrictions  Restrictions/Precautions  Restrictions/Precautions: Weight Bearing, General Precautions, Fall Risk, Up as Tolerated  Required Braces or Orthoses?: No  Lower Extremity Weight Bearing Restrictions  Left Lower Extremity Weight Bearing: Partial Weight Bearing  Partial Weight Bearing Percentage Or Pounds: 50% WB L LE  Position Activity Restriction  Other position/activity restrictions: Do not attempt to bend through L knee (fused)  up with A. Wound vac. Subjective   General  Response To Previous Treatment: Patient with no complaints from previous session.   Family / Caregiver Present: No  Subjective  Subjective: Pt resting in bed upon arrival, agreeable to PT, pleasant and cooperative          Orientation  Orientation  Overall Orientation Status: Within Normal Limits  Cognition      Objective   Bed mobility  Supine to Sit: Moderate assistance  Sit to Supine: Moderate assistance  Scooting: Moderate assistance  Transfers  Sit to Stand: Moderate Assistance;2 Person Assistance  Stand to sit: Moderate Assistance;2 Person Assistance  Bed to Chair: Dependent/Total (SS)  Comment: STS x2 performed requiring Mod A x2 in SS, hieght of bed raised. Pt able to tolerate  standing for ~6mins in  Sera Stedy . Limited by pain and fatigue.   Ambulation  Ambulation?: No     Balance  Posture: Good  Sitting - Static: Good;-  Sitting - Dynamic: Fair;+  Standing - Static: Fair;-  Exercises  Quad Sets: 10 reps  Gluteal Sets: 10 reps  Ankle Pumps: 10 reps      AM-PAC Score  AM-PAC Inpatient Mobility Raw Score : 9 (11/02/21 1222)  AM-PAC Inpatient T-Scale Score : 30.55 (11/02/21 1222)  Mobility Inpatient CMS 0-100% Score: 81.38 (11/02/21 1222)  Mobility Inpatient CMS G-Code Modifier : CM (11/02/21 1222)          Goals  Short term goals  Time Frame for Short term goals: 10 visits  Short term goal 1: transfers with SBA  Short term goal 2: amb 50 ft with a RW x SBA  Short term goal 3: ascend/descend 4 steps with SBA  Short term goal 4: 20 min exercise program x SBA  Patient Goals   Patient goals : Return home    Plan    Plan  Times per week: 5-6x wk  Current Treatment Recommendations: Strengthening, Functional Mobility Training, Transfer Training, Gait Training, Safety Education & Training, Endurance Training, Stair training, Balance Training  Safety Devices  Type of devices: Nurse notified, Left in bed, Call light within reach, Patient at risk for falls, Bed alarm in place, Gait belt  Restraints  Initially in place: No     Therapy Time   Individual Concurrent Group Co-treatment   Time In 1057         Time Out 1137         Minutes 40         Timed Code Treatment Minutes: Yunier Amado PTA

## 2021-11-02 NOTE — PLAN OF CARE
Problem: Pain:  Goal: Pain level will decrease  Description: Pain level will decrease  11/2/2021 0459 by Lucero Calabrese RN  Outcome: Ongoing  11/1/2021 1807 by Iris Hoover RN  Outcome: Ongoing  Goal: Control of acute pain  Description: Control of acute pain  11/2/2021 0459 by Lucero Calabrese RN  Outcome: Ongoing  11/1/2021 1807 by Iris Hoover RN  Outcome: Ongoing  Goal: Control of chronic pain  Description: Control of chronic pain  11/2/2021 0459 by Lucero Calabrese RN  Outcome: Ongoing  11/1/2021 1807 by Iris Hoover RN  Outcome: Ongoing     Problem: Falls - Risk of:  Goal: Will remain free from falls  Description: Will remain free from falls  11/2/2021 0459 by Lucero Calabrese RN  Outcome: Ongoing  11/1/2021 1807 by Iris Hoover RN  Outcome: Ongoing  Goal: Absence of physical injury  Description: Absence of physical injury  11/2/2021 0459 by Lucero Calabrese RN  Outcome: Ongoing  11/1/2021 1807 by Iris Hoover RN  Outcome: Ongoing     Problem: Skin Integrity:  Goal: Will show no infection signs and symptoms  Description: Will show no infection signs and symptoms  11/2/2021 0459 by Lucero Calabrese RN  Outcome: Ongoing  11/1/2021 1807 by Iris Hoover RN  Outcome: Ongoing  Goal: Absence of new skin breakdown  Description: Absence of new skin breakdown  11/2/2021 0459 by Lucero Calabrese RN  Outcome: Ongoing  11/1/2021 1807 by Iris Hoover RN  Outcome: Ongoing     Problem: Musculor/Skeletal Functional Status  Goal: Highest potential functional level  11/2/2021 0459 by Lucero Calabrese RN  Outcome: Ongoing  11/1/2021 1807 by Iris Hoover RN  Outcome: Ongoing     Problem: Nutrition  Goal: Optimal nutrition therapy  11/2/2021 0459 by Lucero Calabrese RN  Outcome: Ongoing  11/1/2021 1807 by Iris Hoover RN  Outcome: Ongoing

## 2021-11-02 NOTE — PROGRESS NOTES
Occupational Therapy  Facility/Department: [address] ORTHO/MED SURG  Daily Treatment Note  NAME: Shazia Salas  : 1950  MRN: [id number]    Date of Service: 2021    Discharge Recommendations: Pt. Would benefit from further skilled OT services to enhance functional outcomes prior to return home. Patient would benefit from continued therapy after discharge       Assessment   Performance deficits / Impairments: Decreased functional mobility ; Decreased endurance;Decreased ADL status; Decreased balance;Decreased high-level IADLs;Decreased strength;Decreased posture  Prognosis: Good  Decision Making: Medium Complexity  OT Education: OT Role;Transfer Training;Precautions  Patient Education: purpose of OT; proper hand and foot placement; balance maintaince; proper posture  Barriers to Learning: pt demo G carry over  REQUIRES OT FOLLOW UP: Yes  Activity Tolerance  Activity Tolerance: Patient Tolerated treatment well  Safety Devices  Safety Devices in place: Yes  Type of devices: Gait belt;Patient at risk for falls;Call light within reach;Nurse notified; Chair alarm in place  Restraints  Initially in place: No         Patient Diagnosis(es): There were no encounter diagnoses. has a past medical history of Arthritis, Diabetes mellitus ([facility] [address] Hyperlipidemia, and Hypertension. has a past surgical history that includes Hysterectomy; knee surgery (Left, 10/26/2021); and Revision total knee arthroplasty (Left, 10/26/2021). Restrictions  Restrictions/Precautions  Restrictions/Precautions: Weight Bearing, General Precautions, Fall Risk, Up as Tolerated  Required Braces or Orthoses?: No  Lower Extremity Weight Bearing Restrictions  Left Lower Extremity Weight Bearing: Partial Weight Bearing  Partial Weight Bearing Percentage Or Pounds: 50% WB L LE  Position Activity Restriction  Other position/activity restrictions: Do not attempt to bend through L knee (fused)  up with A. Wound vac.   Subjective   General  Chart Reviewed: Yes  Patient assessed for rehabilitation services?: Yes  Family / Caregiver Present: Yes (Sister and brother in law.)  Diagnosis: L knee periprosthetic joint infection, antibiotic spacer placed, nailing on 10/26  General Comment  Comments: Pt and RN agreeable to therapy this day  Pain Assessment  Pain Level: 3  Pain Type: Acute pain;Chronic pain  Pain Location: Knee  Pain Orientation: Left  Non-Pharmaceutical Pain Intervention(s): Ambulation/Increased Activity; Emotional support;Repositioned   Orientation  Orientation  Overall Orientation Status: Within Functional Limits  Objective    ADL  Grooming: Setup;Supervision  UE Bathing: Setup; Increased time to complete;Supervision  UE Dressing: Minimal assistance;Setup  LE Dressing: Maximum assistance;Setup  Additional Comments: Pt. in supine position upon entering room. Pt. agreeable to OT services. LB dress max A to don B socks. UB bathe sitting in recliner chair S + set up. Grooming S + set up (brush teeth/wash face/comb hair) sitting in recliner with back support. UB dress sitting EOB (gown) min A to drape around back d/t high BMI. Balance  Sitting Balance: Minimal assistance (Min-CGA, occasional posterior LOB, sat EOB ~10 minutes, static sitting, min cues to use bed rail.)  Standing Balance: Contact guard assistance (x 2 in lori steady)  Standing Balance  Time: Static stand ~5 minutes in lori steady with 2 sitting rest breaks. Activity: static standing in lori steady  Comment: Verbal cues for B hand placement on lori steady, and to keep upright posture. CGA x 2 for stability. Bed mobility  Supine to Sit: Moderate assistance  Sit to Supine: Moderate assistance  Scooting: Moderate assistance  Comment: Mod A x 1 person with LE progression. Mod verbal cues to use bed rail and for tech. Transfers  Sit to stand: 2 Person assistance; Moderate assistance  Stand to sit: 2 Person assistance; Moderate assistance  Transfer Comments: Bib Cornland used.  Mod A x 2 Cognition  Overall Cognitive Status: [facility]                                         Plan   Plan  Times per week: 3-4x                                                    AM-PAC Score        AM-PAC Inpatient Daily Activity Raw Score: 17 (11/02/21 1154)  AM-PAC Inpatient ADL T-Scale Score : 37.26 (11/02/21 1154)  ADL Inpatient CMS 0-100% Score: 50.11 (11/02/21 1154)  ADL Inpatient CMS G-Code Modifier : CK (11/02/21 1154)    Goals  Short term goals  Time Frame for Short term goals: Pt will by discharge  Short term goal 1: demo good safety awareness during func mob around room using RW and CGA  Short term goal 2: demo ADL UB bathing/dressing activity with mod I and increased time  Short term goal 3: demo ADL LB bathing/dressing activity with Min A, AD PRN, and increased time  Short term goal 4: demo activity tolerance for 35 min+  Short term goal 5: identify 2 non-pharmacological pain-relieving techniques with 1 vc       Therapy Time   Individual Concurrent Group Co-treatment   Time In 1115         Time Out 1153         Minutes 38         Timed Code Treatment Minutes: [address], NARANJO/L

## 2021-11-03 ENCOUNTER — APPOINTMENT (OUTPATIENT)
Dept: GENERAL RADIOLOGY | Age: 71
DRG: 464 | End: 2021-11-03
Attending: STUDENT IN AN ORGANIZED HEALTH CARE EDUCATION/TRAINING PROGRAM
Payer: COMMERCIAL

## 2021-11-03 LAB
ABSOLUTE EOS #: 0.13 K/UL (ref 0–0.44)
ABSOLUTE IMMATURE GRANULOCYTE: 0.13 K/UL (ref 0–0.3)
ABSOLUTE LYMPH #: 1.59 K/UL (ref 1.1–3.7)
ABSOLUTE MONO #: 0.71 K/UL (ref 0.1–1.2)
ALBUMIN SERPL-MCNC: 2.5 G/DL (ref 3.5–5.2)
ANION GAP SERPL CALCULATED.3IONS-SCNC: 13 MMOL/L (ref 9–17)
BASOPHILS # BLD: 1 % (ref 0–2)
BASOPHILS ABSOLUTE: 0.04 K/UL (ref 0–0.2)
BUN BLDV-MCNC: 35 MG/DL (ref 8–23)
BUN/CREAT BLD: ABNORMAL (ref 9–20)
C-REACTIVE PROTEIN: 122 MG/L (ref 0–5)
CALCIUM SERPL-MCNC: 8.3 MG/DL (ref 8.6–10.4)
CHLORIDE BLD-SCNC: 96 MMOL/L (ref 98–107)
CO2: 25 MMOL/L (ref 20–31)
CREAT SERPL-MCNC: 1.19 MG/DL (ref 0.5–0.9)
DIFFERENTIAL TYPE: ABNORMAL
EOSINOPHILS RELATIVE PERCENT: 2 % (ref 1–4)
GFR AFRICAN AMERICAN: 54 ML/MIN
GFR NON-AFRICAN AMERICAN: 45 ML/MIN
GFR SERPL CREATININE-BSD FRML MDRD: ABNORMAL ML/MIN/{1.73_M2}
GFR SERPL CREATININE-BSD FRML MDRD: ABNORMAL ML/MIN/{1.73_M2}
GLUCOSE BLD-MCNC: 102 MG/DL (ref 70–99)
GLUCOSE BLD-MCNC: 142 MG/DL (ref 65–105)
GLUCOSE BLD-MCNC: 148 MG/DL (ref 65–105)
GLUCOSE BLD-MCNC: 158 MG/DL (ref 65–105)
GLUCOSE BLD-MCNC: 164 MG/DL (ref 65–105)
HCT VFR BLD CALC: 23.8 % (ref 36.3–47.1)
HEMOGLOBIN: 7.1 G/DL (ref 11.9–15.1)
IMMATURE GRANULOCYTES: 2 %
LYMPHOCYTES # BLD: 29 % (ref 24–43)
MAGNESIUM: 1.8 MG/DL (ref 1.6–2.6)
MCH RBC QN AUTO: 26.3 PG (ref 25.2–33.5)
MCHC RBC AUTO-ENTMCNC: 29.8 G/DL (ref 28.4–34.8)
MCV RBC AUTO: 88.1 FL (ref 82.6–102.9)
MONOCYTES # BLD: 13 % (ref 3–12)
NRBC AUTOMATED: 0 PER 100 WBC
PDW BLD-RTO: 15.2 % (ref 11.8–14.4)
PHOSPHORUS: 4.6 MG/DL (ref 2.6–4.5)
PLATELET # BLD: 303 K/UL (ref 138–453)
PLATELET ESTIMATE: ABNORMAL
PMV BLD AUTO: 9.8 FL (ref 8.1–13.5)
POTASSIUM SERPL-SCNC: 5.3 MMOL/L (ref 3.7–5.3)
RBC # BLD: 2.7 M/UL (ref 3.95–5.11)
RBC # BLD: ABNORMAL 10*6/UL
SEG NEUTROPHILS: 53 % (ref 36–65)
SEGMENTED NEUTROPHILS ABSOLUTE COUNT: 2.92 K/UL (ref 1.5–8.1)
SODIUM BLD-SCNC: 134 MMOL/L (ref 135–144)
WBC # BLD: 5.5 K/UL (ref 3.5–11.3)
WBC # BLD: ABNORMAL 10*3/UL

## 2021-11-03 PROCEDURE — 6370000000 HC RX 637 (ALT 250 FOR IP)

## 2021-11-03 PROCEDURE — 1200000000 HC SEMI PRIVATE

## 2021-11-03 PROCEDURE — 99231 SBSQ HOSP IP/OBS SF/LOW 25: CPT | Performed by: INTERNAL MEDICINE

## 2021-11-03 PROCEDURE — 36415 COLL VENOUS BLD VENIPUNCTURE: CPT

## 2021-11-03 PROCEDURE — 6370000000 HC RX 637 (ALT 250 FOR IP): Performed by: NURSE PRACTITIONER

## 2021-11-03 PROCEDURE — 99232 SBSQ HOSP IP/OBS MODERATE 35: CPT | Performed by: INTERNAL MEDICINE

## 2021-11-03 PROCEDURE — 6360000002 HC RX W HCPCS

## 2021-11-03 PROCEDURE — 86140 C-REACTIVE PROTEIN: CPT

## 2021-11-03 PROCEDURE — 80069 RENAL FUNCTION PANEL: CPT

## 2021-11-03 PROCEDURE — 92611 MOTION FLUOROSCOPY/SWALLOW: CPT

## 2021-11-03 PROCEDURE — 2580000003 HC RX 258

## 2021-11-03 PROCEDURE — 97530 THERAPEUTIC ACTIVITIES: CPT

## 2021-11-03 PROCEDURE — 85025 COMPLETE CBC W/AUTO DIFF WBC: CPT

## 2021-11-03 PROCEDURE — 82947 ASSAY GLUCOSE BLOOD QUANT: CPT

## 2021-11-03 PROCEDURE — 83735 ASSAY OF MAGNESIUM: CPT

## 2021-11-03 PROCEDURE — 6370000000 HC RX 637 (ALT 250 FOR IP): Performed by: INTERNAL MEDICINE

## 2021-11-03 PROCEDURE — 6360000002 HC RX W HCPCS: Performed by: INTERNAL MEDICINE

## 2021-11-03 PROCEDURE — 74230 X-RAY XM SWLNG FUNCJ C+: CPT

## 2021-11-03 PROCEDURE — 97110 THERAPEUTIC EXERCISES: CPT

## 2021-11-03 RX ADMIN — OXYCODONE HYDROCHLORIDE AND ACETAMINOPHEN 2 TABLET: 5; 325 TABLET ORAL at 05:01

## 2021-11-03 RX ADMIN — INSULIN LISPRO 2 UNITS: 100 INJECTION, SOLUTION INTRAVENOUS; SUBCUTANEOUS at 12:03

## 2021-11-03 RX ADMIN — CARVEDILOL 3.12 MG: 3.12 TABLET, FILM COATED ORAL at 16:49

## 2021-11-03 RX ADMIN — METFORMIN HYDROCHLORIDE 1000 MG: 500 TABLET ORAL at 08:21

## 2021-11-03 RX ADMIN — OXYCODONE HYDROCHLORIDE AND ACETAMINOPHEN 2 TABLET: 5; 325 TABLET ORAL at 11:38

## 2021-11-03 RX ADMIN — FUROSEMIDE 40 MG: 40 TABLET ORAL at 08:21

## 2021-11-03 RX ADMIN — ONDANSETRON 4 MG: 2 INJECTION INTRAMUSCULAR; INTRAVENOUS at 08:20

## 2021-11-03 RX ADMIN — ENOXAPARIN SODIUM 30 MG: 30 INJECTION SUBCUTANEOUS at 08:21

## 2021-11-03 RX ADMIN — INSULIN LISPRO 2 UNITS: 100 INJECTION, SOLUTION INTRAVENOUS; SUBCUTANEOUS at 16:49

## 2021-11-03 RX ADMIN — SODIUM CHLORIDE, PRESERVATIVE FREE 10 ML: 5 INJECTION INTRAVENOUS at 08:22

## 2021-11-03 RX ADMIN — CEFTRIAXONE SODIUM 2000 MG: 2 INJECTION, POWDER, FOR SOLUTION INTRAMUSCULAR; INTRAVENOUS at 08:21

## 2021-11-03 RX ADMIN — PANTOPRAZOLE SODIUM 40 MG: 40 TABLET, DELAYED RELEASE ORAL at 08:21

## 2021-11-03 RX ADMIN — CARVEDILOL 3.12 MG: 3.12 TABLET, FILM COATED ORAL at 08:21

## 2021-11-03 RX ADMIN — INSULIN LISPRO 2 UNITS: 100 INJECTION, SOLUTION INTRAVENOUS; SUBCUTANEOUS at 08:02

## 2021-11-03 RX ADMIN — SODIUM CHLORIDE, PRESERVATIVE FREE 10 ML: 5 INJECTION INTRAVENOUS at 22:07

## 2021-11-03 RX ADMIN — INSULIN GLARGINE 38 UNITS: 100 INJECTION, SOLUTION SUBCUTANEOUS at 21:59

## 2021-11-03 RX ADMIN — OXYCODONE HYDROCHLORIDE AND ACETAMINOPHEN 2 TABLET: 5; 325 TABLET ORAL at 16:49

## 2021-11-03 RX ADMIN — ENOXAPARIN SODIUM 30 MG: 30 INJECTION SUBCUTANEOUS at 21:58

## 2021-11-03 RX ADMIN — METFORMIN HYDROCHLORIDE 1000 MG: 500 TABLET ORAL at 16:49

## 2021-11-03 RX ADMIN — AMLODIPINE BESYLATE 10 MG: 10 TABLET ORAL at 08:21

## 2021-11-03 RX ADMIN — OXYCODONE HYDROCHLORIDE AND ACETAMINOPHEN 2 TABLET: 5; 325 TABLET ORAL at 22:20

## 2021-11-03 RX ADMIN — INSULIN LISPRO 1 UNITS: 100 INJECTION, SOLUTION INTRAVENOUS; SUBCUTANEOUS at 21:58

## 2021-11-03 ASSESSMENT — PAIN SCALES - GENERAL
PAINLEVEL_OUTOF10: 8
PAINLEVEL_OUTOF10: 6
PAINLEVEL_OUTOF10: 0
PAINLEVEL_OUTOF10: 6
PAINLEVEL_OUTOF10: 8
PAINLEVEL_OUTOF10: 5
PAINLEVEL_OUTOF10: 6

## 2021-11-03 ASSESSMENT — PAIN DESCRIPTION - ONSET: ONSET: ON-GOING

## 2021-11-03 ASSESSMENT — PAIN DESCRIPTION - FREQUENCY: FREQUENCY: INTERMITTENT

## 2021-11-03 ASSESSMENT — PAIN DESCRIPTION - LOCATION: LOCATION: KNEE

## 2021-11-03 ASSESSMENT — PAIN DESCRIPTION - PAIN TYPE: TYPE: ACUTE PAIN;SURGICAL PAIN

## 2021-11-03 ASSESSMENT — PAIN DESCRIPTION - DESCRIPTORS: DESCRIPTORS: PRESSURE

## 2021-11-03 ASSESSMENT — PAIN DESCRIPTION - PROGRESSION: CLINICAL_PROGRESSION: GRADUALLY WORSENING

## 2021-11-03 ASSESSMENT — PAIN - FUNCTIONAL ASSESSMENT: PAIN_FUNCTIONAL_ASSESSMENT: PREVENTS OR INTERFERES SOME ACTIVE ACTIVITIES AND ADLS

## 2021-11-03 ASSESSMENT — PAIN DESCRIPTION - ORIENTATION: ORIENTATION: LEFT

## 2021-11-03 NOTE — PROCEDURES
INSTRUMENTAL SWALLOW REPORT  MODIFIED BARIUM SWALLOW    NAME: Arthur Frazier   : 1950  MRN: [id number]       Date of Eval: 11/3/2021        Radiologist: Dr. Volodymyr Putnam    Past Medical History:  has a past medical history of Arthritis, Diabetes mellitus ([address] Hyperlipidemia, and Hypertension. Past Surgical History:  has a past surgical history that includes Hysterectomy; knee surgery (Left, 10/26/2021); and Revision total knee arthroplasty (Left, 10/26/2021). Current Diet Solid Consistency: Regular  Current Diet Liquid Consistency: Thin       Type of Study: Initial MBS      Patient Complaints/Reason for Referral:  Arthur Frazier was referred for a MBS to assess the efficiency of her swallow function, assess for aspiration, and to make recommendations regarding safe dietary consistencies, effective compensatory strategies, and safe eating environment. Behavior/Cognition/Vision/Hearing:  Behavior/Cognition: Alert; Cooperative  Vision: Within Functional Limits  Hearing: Within functional limits    Impressions:  Pt. With no penetration, no aspiration with nectar thick liquid, puree and soft solids. + flash penetration, no aspiration with thin liquid via straw. No penetration, no aspiration with thin liquid via cup with chin tuck. Premature spill noted with all consistencies tested. Mild extended mastication noted with soft solids. ST to recommend Easy to Chew diet with thin liquid with chin tuck. NO STRAWS. If s/s of aspiration occur, d/c all PO and recommend NPO. Results and recommendations reported to RN. Patient Degrees: upright in chair  Consistencies Administered: Dysphagia Soft and Bite-Sized (Dysphagia III); Dysphagia Pureed (Dysphagia I); Thin straw; Thin cup;Nectar straw    Compensatory Swallowing Strategies Attempted: Upright as possible for all oral intake;Eat/Feed slowly; Small bites/sips    Recommended Diet:  Solid consistency: Easy to Chew  Liquid consistency:  Thin (with chin tuck) no straws    Safe Swallow Protocol:     Compensatory Swallowing Strategies: Upright as possible for all oral intake;Eat/Feed slowly; Small bites/sips; Chin tuck    Recommendations/Treatment  Requires SLP Intervention: Yes  D/C Recommendations: Further therapy recommended at discharge. Postural Changes and/or Swallow Maneuvers: Chin tuck      Recommended Exercises:    Therapeutic Interventions: Oral motor exercises; Ingris;Diet tolerance monitoring;Patient/Family education    Education: Images and recommendations were reviewed with pt. And RN following this exam.   Patient Education: yes  Patient Education Response: Verbalizes understanding    Prognosis  Prognosis for safe diet advancement: fair  Duration/Frequency of Treatment  Duration/Frequency of Treatment: 3-5 x week      Goals:    Long Term: To Maximize safety with intake, optimize nutrition/hydration and minimize risk for aspiration. Short Term:     Goal 1: Pt. will complete OMEX for dysphagia 10-20 x per session. Dysphagia Goals: The patient will tolerate recommended diet without observed clinical signs of aspiration      Oral Preparation / Oral Phase  Oral Phase: Impaired  Oral Phase: Premature spill noted with all consistencies tested. Mild extended mastication noted with soft solids. Pharyngeal Phase  Pharyngeal Phase: Impaired   Pharyngeal: No penetration, no aspiration with nectar thick liquid, puree and soft solids. + flash penetration, no aspiration with thin liquid via straw. No penetration, no aspiration with thin liquid via cup with chin tuck. Esophageal Phase  Esophageal Screen: WFL    Pain   Patient Currently in Pain: No  Pain Level: 6  Pain Type: Surgical pain  Pain Location: Leg      Therapy Time:   Individual Concurrent Group Co-treatment   Time In 1500         Time Out 1510         Minutes Jorge Luis Mccormack M.A. CCC-SLP, 11/3/2021, 3:16 PM

## 2021-11-03 NOTE — PLAN OF CARE
Nutrition Problem #1: Inadequate oral intake  Intervention: Food and/or Nutrient Delivery: Modify Current Diet, Continue Oral Nutrition Supplement  Nutritional Goals: PO intake to meet > 50% of estimated nutrition needs

## 2021-11-03 NOTE — PROGRESS NOTES
[facility]  Office: [address], DO, Vargas Dunn, DO, Florian Baker, DO, Marlee Lyons, DO, Laura Hernandez MD, Nava Morrison MD, Les Garzon MD, Tate White MD, Lexx Kruger MD, Daryn Chris MD, Parris Chen MD, Rosetta Feng MD, Oma Gold, DO, Lynne Grayson, DO, Savita Abreu MD,  Altaf Garner, DO, Pao Wiley MD, Eve Yin MD, Cheyenne Baker MD, Paula Forbes MD, Rachel Cardenas MD, Severiano Border, MD, Braden Chen [facility], [facility], [facility], Hema Hernandez, [facility], Mohinder Duarte, CNS, Silvina Jaime, CNP, Artemio Sosa, CNP, Chaya Adames, CNP, Carmelo Agustin, CNP, Marta Cloud, CNP, Suzanne Doty, CNP, Alanna Gil PA-C, Augustine Rouse, [facility], Mary Castro, CNP, Charisma Salazar, CNP, Osbaldo Cannon, CNP, Elizabeth Khanna, CNP, Feli Grayson, CNP, Augustine Maharaj, CNP, Brian Gomez, [address] [place] [address]    Progress Note    11/3/2021    12:14 PM    Name:   Page Camargo  MRN:     [id number]     Acct:      [de-identified]   Room:   [address] [place] [address]  IP Day:  15  Admit Date:  10/21/2021 10:51 PM    PCP:   Lydia Estrella  Code Status:  Full Code    Subjective:     C/C: arthritis  Interval History Status: not changed. Patient with no complaints today, still waiting for precertification    Brief History:     71 y.o. female being seen for a left knee periprosthetic joint infection. S/p knee explant, Irrigation/debridement, with placement of static antibiotic spacer and ortho fusion nail on 10/27. Started on R ocephin 2 gm IV every 24 hours  For one month followed by long term oral supression therapy. Needs follow up with Dr. Mik Bravo in 3 weeks. Fixing electrolytes and anemia (see below)    Currently waiting on SNF placement.      Review of Systems:     Constitutional:  negative for chills, fevers, sweats  Respiratory:  negative for cough, dyspnea on exertion, shortness of breath, wheezing  Cardiovascular: negative for chest pain, chest pressure/discomfort, lower extremity edema, palpitations  Gastrointestinal:  negative for abdominal pain, constipation, diarrhea, nausea, vomiting  Neurological:  negative for dizziness, headache has some light headedness. EXT: knee pain is improving, admits to decreased range of motion but doing well. Medications: Allergies: Allergies   Allergen Reactions    Bactrim [Sulfamethoxazole-Trimethoprim]     Codeine     Lisinopril Other (See Comments)     cough       Current Meds:   Scheduled Meds:    insulin glargine  38 Units SubCUTAneous Nightly    furosemide  40 mg Oral Daily    metFORMIN  1,000 mg Oral BID WC    polyethylene glycol  17 g Oral Daily    enoxaparin  30 mg SubCUTAneous BID    amLODIPine  10 mg Oral Daily    carvedilol  3.125 mg Oral BID WC    sodium chloride flush  5-40 mL IntraVENous 2 times per day    sodium chloride flush  5-40 mL IntraVENous 2 times per day    [Held by provider] losartan  100 mg Oral Daily    pantoprazole  40 mg Oral QAM AC    insulin lispro  0-12 Units SubCUTAneous TID WC    cefTRIAXone (ROCEPHIN) IV  2,000 mg IntraVENous Q24H    insulin lispro  0-6 Units SubCUTAneous Nightly     Continuous Infusions:    dextrose       PRN Meds: oxyCODONE-acetaminophen, acetaminophen, glucose, dextrose, glucagon (rDNA), dextrose, ondansetron **OR** ondansetron, acetaminophen **OR** acetaminophen    Data:     Past Medical History:   has a past medical history of Arthritis, Diabetes mellitus ([address] Hyperlipidemia, and Hypertension. Social History:   reports that she has never smoked. She does not have any smokeless tobacco history on file. She reports that she does not drink alcohol and does not use drugs. Family History: No family history on file.     Vitals:  BP (!) 144/54   Pulse 77   Temp 98.4 °F (36.9 °C) (Oral)   Resp 18   Ht 5' 4\" (1.626 m)   Wt 297 lb (134.7 kg)   SpO2 93%   BMI 50.98 kg/m²   Temp (24hrs), Av.4 °F (36.9 °C), Min:98.3 °F (36.8 °C), Max:98.6 °F (37 °C)    Recent Labs     11/02/21  1626 11/02/21  2143 11/03/21  0659 11/03/21  1136   POCGLU 185* 207* 142* 148*       I/O (24Hr): Intake/Output Summary (Last 24 hours) at 11/3/2021 1214  Last data filed at 11/2/2021 1736  Gross per 24 hour   Intake --   Output 700 ml   Net -700 ml       Labs:  Hematology:  Recent Labs     10/31/21  1720 11/01/21  0823 11/01/21  0927 11/02/21  0500 11/03/21  0452   WBC   < >  --  6.0 5.0 5.5   RBC   < >  --  2.61* 2.68* 2.70*   HGB   < >  --  7.1* 7.1* 7.1*   HCT   < >  --  23.8* 24.1* 23.8*   MCV   < >  --  91.2 89.9 88.1   MCH   < >  --  27.2 26.5 26.3   MCHC   < >  --  29.8 29.5 29.8   RDW   < >  --  14.9* 15.0* 15.2*   PLT   < >  --  321 310 303   MPV   < >  --  10.1 9.9 9.8   CRP  --  123.5*  --   --  122.0*    < > = values in this interval not displayed.      Chemistry:  Recent Labs     11/01/21  0927 11/02/21  0500 11/03/21  0452   * 129* 134*   K 4.9 5.2 5.3   CL 95* 94* 96*   CO2 23 24 25   GLUCOSE 214* 110* 102*   BUN 31* 32* 35*   CREATININE 1.06* 1.19* 1.19*   MG 1.6 1.8 1.8   ANIONGAP 13 11 13   LABGLOM 51* 45* 45*   GFRAA >60 54* 54*   CALCIUM 8.4* 8.2* 8.3*   PHOS 5.1* 5.1* 4.6*     Recent Labs     10/31/21  1720 10/31/21  1923 11/01/21  0927 11/01/21  1157 11/02/21  0500 11/02/21  0641 11/02/21  0742 11/02/21  1128 11/02/21  1626 11/02/21  2143 11/03/21  0452 11/03/21  0659 11/03/21  1136   PROT 7.1  --   --   --   --   --   --   --   --   --   --   --   --    LABALBU 2.2*   < > 2.3*  --  2.3*  --   --   --   --   --  2.5*  --   --    AST 27  --   --   --   --   --   --   --   --   --   --   --   --    ALT 18  --   --   --   --   --   --   --   --   --   --   --   --    ALKPHOS 297*  --   --   --   --   --   --   --   --   --   --   --   --    BILITOT 0.17*  --   --   --   --   --   --   --   --   --   --   --   --    POCGLU  --    < >  --    < >  --    < > 80 130* 185* 207*  --  142* 148*    < > = values in this interval not displayed. ABG:  Lab Results   Component Value Date    FIO2 INFORMATION NOT PROVIDED 10/30/2021     Lab Results   Component Value Date/Time    SPECIAL RT 4ML 10/21/2021 11:00 PM     Lab Results   Component Value Date/Time    CULTURE NO GROWTH 6 DAYS 10/21/2021 11:00 PM       Radiology:  XR FEMUR LEFT (MIN 2 VIEWS)    Result Date: 10/22/2021  1. No acute osseous abnormality in the left femur or tibia/fibula. 2.  Left knee arthroplasty in place without evidence for hardware loosening. 3.  Leg length measurements provided above. Please note limitations and landmarks used. XR KNEE LEFT (3 VIEWS)    Result Date: 10/22/2021  1. Soft tissue edema and effusion. 2. No acute osseous abnormality. XR TIBIA FIBULA LEFT (2 VIEWS)    Result Date: 10/22/2021  1. No acute osseous abnormality in the left femur or tibia/fibula. 2.  Left knee arthroplasty in place without evidence for hardware loosening. 3.  Leg length measurements provided above. Please note limitations and landmarks used. XR CHEST PORTABLE    Result Date: 10/22/2021  1. Aberrant positioning of the right upper extremity PICC line. Tip is either within the distal left subclavian vein, or directed into the azygous vein. Repositioning is recommended 2. Report was marked to be called to a licensed caregiver     NM Cardiac Stress Test Nuclear Imaging    Result Date: 10/25/2021  Perfusion:  Stress-induced reversible defects as above involving 12% of the left ventricular myocardium at stress. Function:  Normal left ventricular ejection fraction of 55%. Mild perfusion defect in the apex and mid inferior wall. Risk stratification: HIGH DUE TO PERFUSION DEFECTS. Notes concerning risk stratification: Risk stratification incorporates both clinical history and some testing results.   Final risk determination is the responsibility of the ordering provider as other patient information and test results may increase or decrease the risk assessment reported for this examination. Risk stratification criteria are adapted from \"Noninvasive Risk Stratification\" criteria from [facility]. Al, ACC/AATS/AHA/ASE/ASNC/SCAI/SCCT/STS 2017 Appropriate Use Criteria For Coronary Revascularization in Patients With Stable Ischemic Heart Disease [facility] Volume 69, Issue 17, May 2017 High risk (>3% annual death or MI) 1. Severe resting LV dysfunction (LVEF <35%) not readily explained by non coronary causes 2. Resting perfusion abnormalities greater than 10% of the myocardium in patients without prior history or evidence of MI3. Stress-induced perfusion abnormalities encumbering greater than or equal to 10% myocardium or stress segmental scores indicating multiple vascular territories with abnormalities 4. Stress-induced LV dilatation (TID ratio greater than 1.19 for exercise and greater than 1.39 for regadenoson) Intermediate risk (1% to 3% annual death or MI) 1. Mild/moderate resting LV dysfunction (LVEF 35% to 49%) not readily explained by non coronary causes. 2. Resting perfusion abnormalities in 5%-9.9% of the myocardium in patients without a history or prior evidence of MI 3. Stress-induced perfusion abnormality encumbering 5%-9.9% of the myocardium or stress segmental scores indicating 1 vascular territory with abnormalities but without LV dilation 4. Small wall motion abnormality involving 1-2 segments and only 1 coronary bed. Low Risk (Less than 1% annual death or MI) 1. Normal or small myocardial perfusion defect at rest or with stress encumbering less than 5% of the myocardium. Physical Examination:        General appearance:  alert, cooperative and no distress, obese female.    Mental Status:  oriented to person, place and time and normal affect  Lungs:  clear to auscultation bilaterally, normal effort  Heart:  regular rate and rhythm, no murmur  Abdomen:  soft, nontender, nondistended, normal bowel sounds, no masses, hepatomegaly, splenomegaly  Extremities:  no edema, redness, tenderness in the calves  Skin:  no gross lesions, rashes, induration. Knee incision c/d/i, drain in place. Assessment:        Hospital Problems         Last Modified POA    * (Principal) Staphylococcal arthritis of left knee ([facility] [address] 10/22/2021 Yes    Type 2 diabetes mellitus without complication, without long-term current use of insulin ([address] 10/22/2021 Yes    PITA on CPAP 10/22/2021 Yes    CAD (coronary artery disease) 10/22/2021 Yes    COPD (chronic obstructive pulmonary disease) ([address] 10/22/2021 Yes    Primary hypertension 10/22/2021 Yes    Acute kidney injury superimposed on CKD ([facility] [address] 10/22/2021 Yes    Normocytic normochromic anemia 10/22/2021 Yes          Plan:        Left knee periprosthetic joint infection: S/p knee explant, Irrigation/debridement, with placement of static antibiotic spacer and ortho fusion nail on 10/27. Continue Rocephin 2 gm IV every 24 hours  For one month followed by long term oral supression therapy. Midline placed. Needs follow up with Dr. Deena Morgan in 3 weeks. Blood cultures NGDT . Hyperk- hold cozaar, normotensive. Caution with restarting   Post operative anemia: 2/2 blood loss anemia +/-Functional NOY. Received one unit of PRBC on 10/28 for anemia, tachycardia and hgb of 6.8. Tsat: 9, Ferritin 372. Since blood cultures negative and infection clearing, will start IV iron while waiting on placement, may be discharged on PO when bed available. Constipation: continue bowel regimen. Continue polyethylene glycol. Hyponatremia: 2/2 osmotic drag from hyperglycemia with component of SIADH due to pain/sepsis. Urine sodium 63, urine osm 403. Diabetes mellitus type II with Hyperglycema:  Lantus 38 units and restarted Metformin. QHS, ISS  Morbid Obesity BMI of 51: recommend weight loss and lifestyle modification  Uncontrolled Hypertension-improving: continue home antihypertensive regimen.  Continue Coreg 3.125, Norvasc  Suspected PITA: needs outpt sleep study  DVT ppx  PT/OT    Dispo: Waiting on SNF placement.   Precertification in process        Isa Verdin DO  11/3/2021  12:14 PM

## 2021-11-03 NOTE — PROGRESS NOTES
Physical Therapy  Facility/Department: [address] ORTHO/MED SURG  Daily Treatment Note  NAME: Yara Romero  : 1950  MRN: [id number]    Date of Service: 11/3/2021    Discharge Recommendations:  Patient would benefit from continued therapy after discharge   PT Equipment Recommendations  Equipment Needed: No    Assessment   Body structures, Functions, Activity limitations: Decreased functional mobility ; Decreased strength;Decreased endurance  Assessment: Pt required Mod A for bed mobs , Mod A  sit to stand for ~6mins Partial Weight Bearing  LLE. Limited by pain , Motivated to improve, Recommend continued PT after d/c to address deficits. Prognosis: Good  PT Education: Goals;Plan of Care;General Safety;Gait Training;Transfer Training;Functional Mobility Training  REQUIRES PT FOLLOW UP: Yes  Activity Tolerance  Activity Tolerance: Patient limited by fatigue;Patient limited by pain; Patient limited by endurance     Patient Diagnosis(es): There were no encounter diagnoses. has a past medical history of Arthritis, Diabetes mellitus ([address] Hyperlipidemia, and Hypertension. has a past surgical history that includes Hysterectomy; knee surgery (Left, 10/26/2021); and Revision total knee arthroplasty (Left, 10/26/2021). Restrictions  Restrictions/Precautions  Restrictions/Precautions: Weight Bearing, General Precautions, Fall Risk, Up as Tolerated  Required Braces or Orthoses?: No  Lower Extremity Weight Bearing Restrictions  Left Lower Extremity Weight Bearing: Partial Weight Bearing  Partial Weight Bearing Percentage Or Pounds: 50% WB L LE  Position Activity Restriction  Other position/activity restrictions: Do not attempt to bend through L knee (fused)  up with A. Wound vac. Subjective   General  Response To Previous Treatment: Patient with no complaints from previous session. Family / Caregiver Present: No  Subjective  Subjective: RN and pt agreeable to PT.  Pt required some encouargement this date with goo d return, pleasant and cooperative  Pain Screening  Patient Currently in Pain: Denies  Vital Signs  Patient Currently in Pain: Denies       Orientation  Orientation  Overall Orientation Status: Within Normal Limits  Cognition      Objective   Bed mobility  Rolling to Left: Minimal assistance  Rolling to Right: Minimal assistance  Supine to Sit: Moderate assistance  Sit to Supine: Moderate assistance  Scooting: Moderate assistance  Transfers  Sit to Stand: Moderate Assistance  Stand to sit: Moderate Assistance  Comment: STS x1 with Hieght of bed elavated ,RW ,  cueing for hand placement and sequencing with good return. Ambulation  Ambulation?: No     Balance  Posture: Good  Sitting - Static: Good;-  Sitting - Dynamic: Fair;+  Standing - Static: Fair;- (Stood x6mins with CGA for balance, demo Flexed posture throughout , max cueing for posture with fair return.  limited by fear of falling and pain with WB.)  Standing - Dynamic: Poor;+  Comments: Standing with RW  Exercises  Quad Sets: 10 reps  Gluteal Sets: 10 reps  Ankle Pumps: 10 reps         AM-PAC Score  AM-PAC Inpatient Mobility Raw Score : 9 (11/02/21 1222)  AM-PAC Inpatient T-Scale Score : 30.55 (11/02/21 1222)  Mobility Inpatient CMS 0-100% Score: 81.38 (11/02/21 1222)  Mobility Inpatient CMS G-Code Modifier : CM (11/02/21 1222)          Goals  Short term goals  Time Frame for Short term goals: 10 visits  Short term goal 1: transfers with SBA  Short term goal 2: amb 50 ft with a RW x SBA  Short term goal 3: ascend/descend 4 steps with SBA  Short term goal 4: 20 min exercise program x SBA  Patient Goals   Patient goals : Return home    Plan    Plan  Times per week: 5-6x wk  Current Treatment Recommendations: Strengthening, Functional Mobility Training, Transfer Training, Gait Training, Safety Education & Training, Endurance Training, Stair training, Balance Training  Safety Devices  Type of devices: Nurse notified, Left in bed, Call light within reach, Patient at risk for falls, Bed alarm in place, Gait belt  Restraints  Initially in place: No     Therapy Time   Individual Concurrent Group Co-treatment   Time In 1101         Time Out 1133         Minutes 32         Timed Code Treatment Minutes: 520 [address], [facility]

## 2021-11-03 NOTE — PLAN OF CARE
Problem: Falls - Risk of:  Goal: Will remain free from falls  Description: Will remain free from falls  11/3/2021 1720 by Collin Bruno RN  Outcome: Met This Shift  11/3/2021 0430 by Monserrat Combs RN  Outcome: Ongoing  Goal: Absence of physical injury  Description: Absence of physical injury  11/3/2021 1720 by Collin Bruno RN  Outcome: Met This Shift  11/3/2021 0430 by Monserrat Combs RN  Outcome: Ongoing     Problem: Pain:  Goal: Pain level will decrease  Description: Pain level will decrease  11/3/2021 1720 by Collin Bruno RN  Outcome: Ongoing  11/3/2021 0430 by Monserrat Combs RN  Outcome: Ongoing  Goal: Control of acute pain  Description: Control of acute pain  11/3/2021 1720 by Collin Bruno RN  Outcome: Ongoing  11/3/2021 0430 by Monserrat Combs RN  Outcome: Ongoing  Goal: Control of chronic pain  Description: Control of chronic pain  11/3/2021 1720 by Collin Bruno RN  Outcome: Ongoing  11/3/2021 0430 by Monserrat Combs RN  Outcome: Ongoing     Problem: Skin Integrity:  Goal: Will show no infection signs and symptoms  Description: Will show no infection signs and symptoms  11/3/2021 1720 by Collin Bruno RN  Outcome: Ongoing  11/3/2021 0430 by Monserrat Combs RN  Outcome: Ongoing  Goal: Absence of new skin breakdown  Description: Absence of new skin breakdown  11/3/2021 1720 by Collin Bruno RN  Outcome: Ongoing  11/3/2021 0430 by Monserrat Combs RN  Outcome: Ongoing     Problem: Musculor/Skeletal Functional Status  Goal: Highest potential functional level  11/3/2021 1720 by Collin Bruno RN  Outcome: Ongoing  11/3/2021 0430 by Monserrat Combs RN  Outcome: Ongoing     Problem: Nutrition  Goal: Optimal nutrition therapy  11/3/2021 1720 by Collin Bruno RN  Outcome: Ongoing  11/3/2021 1536 by Kushal Almaraz, RD, LD  Note: Nutrition Problem #1: Inadequate oral intake  Intervention: Food and/or Nutrient Delivery: Modify Current Diet, Continue Oral Nutrition Supplement  Nutritional Goals: PO intake to meet > 50% of estimated nutrition needs    11/3/2021 0430 by Lawyer Elaine RN  Outcome: Ongoing

## 2021-11-03 NOTE — CARE COORDINATION
Transition planning  Called and spoke with Yenny Odom at [facility] re: precert, he states they have checked daily and are still waiting. 1538 Received call from Yenny Odom at [facility], they have precert for patient, patient needs rapid covid test before going to facility. PS Dr. Coleman Burton for rapid covid test order. Report # [phone] (3rd floor nurse)  Fax # [phone]    [phone] Patient leaves floor for barium swallow study. 1500 Medical necessity form faxed to [facility] network to request transportation. [phone] with Astrid at [facility], unable to arrange transport tonight before 9:00 pm ( patient going to facility in [place]), transport set for tomorrow at 9:00 am. Patient, pt's RN, and Yenny Odom from [facility] updated. Patient had covid test 11-1, Yenny Odom states that is acceptable for patient admission.

## 2021-11-03 NOTE — CARE COORDINATION
Secure e-mail sent to leadership @ [facility] to check the status of the precert and if there is anything we can assist with in expediting the decision. Await reply.

## 2021-11-03 NOTE — PROGRESS NOTES
Surgical Incision, Wound Vac       Current Nutrition Therapies:    ADULT ORAL NUTRITION SUPPLEMENT; Breakfast, Lunch, Dinner; Diabetic Oral Supplement  ADULT DIET; Regular; 4 carb choices (60 gm/meal); Low Phosphorus (Less than 1000 mg); 1500 ml    Anthropometric Measures:  · Height: 5' 4\" (162.6 cm)  · Current Body Weight: 297 lb (134.7 kg)   · Admission Body Weight: 293 lb 8 oz (133.1 kg)    · Usual Body Weight:  265-285 lbs per Patient     · Ideal Body Weight: 120 lbs; % Ideal Body Weight 247.5 %   · BMI: 51  · Adjusted Body Weight:  No Adjustment   · BMI Categories: Obese Class 3 (BMI 40.0 or greater)       Nutrition Diagnosis:   · Inadequate oral intake related to decreased appetite / current medical condition as evidenced by nausea and variable PO intakes    Nutrition Interventions:   Food and/or Nutrient Delivery:  Modify Current Diet, Continue Oral Nutrition Supplement  Nutrition Education/Counseling:  No recommendation at this time   Coordination of Nutrition Care:  Continue to monitor while inpatient, Speech Therapy    Goals:  PO intake to meet > 50% of estimated nutrition needs       Nutrition Monitoring and Evaluation:   Behavioral-Environmental Outcomes:  None Identified   Food/Nutrient Intake Outcomes:  Food and Nutrient Intake, Supplement Intake  Physical Signs/Symptoms Outcomes:  Biochemical Data, Chewing or Swallowing, GI Status, Fluid Status or Edema, Nutrition Focused Physical Findings, Skin, Weight     Discharge Planning:     Too soon to determine     Electronically signed by Bharat Espinoza RD, LD on 11/3/21 at 3:34 PM EDT    Contact: [phone]

## 2021-11-03 NOTE — PROGRESS NOTES
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 Stop date 11-20-21)  Long term oral antibiotic suppression following the above with Keflex 500 mg po qid  Will need midline for IV antibiotics  Orthopedic surgery following-  S/p  explantation and antibiotic spacer device placement 10-26-21. Office f/up in 2-3 weeks with Dr Rosana Ryan for infection. Please call [phone] for appointment and coordinate with follow up visit with Orthopedics. Medical Decision Making/Summary/Discussion:11/3/2021       Infection Control Recommendations   [place] Precautions      Antimicrobial Stewardship Recommendations     Simplification of therapy    Coordination of Outpatient Care:   Estimated Length of IV antimicrobials:4 weeks  Patient will need Midline Catheter Insertion: Yes  Patient will need PICC line Insertion:No  Patient will need: Home IV , [place],  SNF,  LTAC: TBD  Patient will need outpatient wound care:Yes    Chief complaint/reason for consultation:   Lt knee pyogenic arthritis      History of Present Illness:   Yara Romero is a 80y.o.-year-old female who was initially admitted on 10/21/2021. Patient seen at the request of Dr. Yusef Kiser:    Patient with a Hx of prior Lt TKA  In 2007 at [facility] under Dr Anita Doyle, She subsequently experienced  Lt knee infections which required additional surgeries x 2. She has underlying DM 2, Essential HTN, CKD,PITA and COPD. Presented to [facility] because of acute onset of pain with ambulation.  The knee joint was aspirated and grew Strep. Agalactiae. She was started on antibiotics (ceftriaxone) and was awaiting evaluation at tertiary care center because of her Hx of prior surgeries and infections. [facility] refused transfer. She presented to [facility] because of ongoing severe pain, erythema and inflammation. Ortho has evaluated and plan intervention on 10-25-21. CURRENT EVALUATION : 11/3/2021    Afebrile  VS stable    Had pre-op evaluation by Cardiology including stress test.  Patient had positive stress test.   S/p left heart cath 10/26: normal coronary arteries, preserved LV systolic function    Orthopedic Surgery performed  Left knee I&D with fusion nail on 10/26/21 explant/spacer placement. Patient has been doing well post-op. Will be going to SNF on 11-3-21 to continue antibiotic treatment and rehab. Denies any fever, chills, nausea, vomiting  She will need IV ceftriaxone x 4 weeks and then long term oral antibiotic suppression. Will coordinate follow up visit with time she comes to see Ortho for follow up. Labs, X rays reviewed: 11/3/2021    BUN: 40-->22-->29-->32  Cr: 1.57-->1.15-->1.08-->1.19    WBC: 11.7-->8.2-->7.8-->5.0  Hb: 6.8-->7.2-->7.8-->7.1  Plat: 269-->278-->263-->337--->310    Cultures:  Urine:    Blood:  10/21/21: no growth  Sputum :    Wound:  Knee aspirate: Strep agalactiae      COVID rapid 10/22/21: negative    Discussed with patient, RN, IM. I have personally reviewed the past medical history, past surgical history, medications, social history, and family history, and I have updated the database accordingly.   Past Medical History:     Past Medical History:   Diagnosis Date    Arthritis     Diabetes mellitus ([address]     Hyperlipidemia     Hypertension        Past Surgical  History:     Past Surgical History:   Procedure Laterality Date    HYSTERECTOMY      KNEE SURGERY Left 10/26/2021    KNEE EXPLANT, IRRIGATION & DEBRIDEMENT, STATIC ANTIBIOTIC SPACER, LINK ORTHO FUSION NAIL with Friends and Family:     Attends [employer] Services:     Active Member of Clubs or Organizations:     Attends Club or Organization Meetings:     Marital Status:    Intimate Partner Violence:     Fear of Current or Ex-Partner:     Emotionally Abused:     Physically Abused:     Sexually Abused:        Family History:   No family history on file. Allergies:   Bactrim [sulfamethoxazole-trimethoprim], Codeine, and Lisinopril     Review of Systems:   Constitutional: No fevers or chills. No systemic complaints  Head: No headaches  Eyes: No double vision or blurry vision. No conjunctival inflammation. ENT: No sore throat or runny nose. . No hearing loss, tinnitus or vertigo. Cardiovascular: No chest pain or palpitations. No shortness of breath. No FRANCES  Lung: No shortness of breath or cough. No sputum production  Abdomen: No nausea, vomiting, diarrhea, or abdominal pain. Rowlett Copping No cramps. Genitourinary: No increased urinary frequency, or dysuria. No hematuria. No suprapubic or CVA pain  Musculoskeletal: No muscle aches or pains. No joint effusions, swelling or deformities. Lt knee effusion  Hematologic: No bleeding or bruising. Neurologic: No headache, weakness, numbness, or tingling. Integument: No rash, no ulcers. Lt knee erythema  Psychiatric: No depression. Endocrine: No polyuria, no polydipsia, no polyphagia. Physical Examination :     Patient Vitals for the past 8 hrs:   BP Pulse Resp SpO2 Height   11/03/21 1528 -- -- -- -- 5' 4\" (1.626 m)   11/03/21 0800 (!) 144/54 77 18 93 % --     General Appearance: Awake, alert, and in no apparent distress  Head:  Normocephalic, no trauma  Eyes: Pupils equal, round, reactive to light and accommodation; extraocular movements intact; sclera anicteric; conjunctivae pink. No embolic phenomena. ENT: Oropharynx clear, without erythema, exudate, or thrush. No tenderness of sinuses. Mouth/throat: mucosa pink and moist. No lesions. Dentition in good repair.   Neck:Supple, without lymphadenopathy. Thyroid normal, No bruits. Pulmonary/Chest: Clear to auscultation, without wheezes, rales, or rhonchi. No dullness to percussion. Cardiovascular: Regular rate and rhythm without murmurs, rubs, or gallops. Abdomen: Soft, non tender. Bowel sounds normal. No organomegaly  All four Extremities: No cyanosis, clubbing, edema. Lt knee effusion and erythema. Neurologic: No gross sensory or motor deficits. Skin: Warm and dry with good turgor. No signs of peripheral arterial or venous insufficiency. No ulcerations. No open wounds. Medical Decision Making -Laboratory:   I have independently reviewed/ordered the following labs:    CBC with Differential:   Recent Labs     11/02/21  0500 11/03/21 0452   WBC 5.0 5.5   HGB 7.1* 7.1*   HCT 24.1* 23.8*    303   LYMPHOPCT 25 29   MONOPCT 10 13*     BMP:   Recent Labs     11/02/21  0500 11/03/21 0452   * 134*   K 5.2 5.3   CL 94* 96*   CO2 24 25   BUN 32* 35*   CREATININE 1.19* 1.19*   MG 1.8 1.8     Hepatic Function Panel:   Recent Labs     10/31/21  1720 11/01/21  0927 11/02/21  0500 11/03/21  0452   PROT 7.1  --   --   --    LABALBU 2.2*   < > 2.3* 2.5*   BILITOT 0.17*  --   --   --    ALKPHOS 297*  --   --   --    ALT 18  --   --   --    AST 27  --   --   --     < > = values in this interval not displayed. No results for input(s): RPR in the last 72 hours. No results for input(s): HIV in the last 72 hours. No results for input(s): BC in the last 72 hours. Lab Results   Component Value Date    MUCUS NOT REPORTED 10/22/2021    RBC 2.70 11/03/2021    TRICHOMONAS NOT REPORTED 10/22/2021    WBC 5.5 11/03/2021    YEAST NOT REPORTED 10/22/2021    TURBIDITY Clear 10/22/2021     Lab Results   Component Value Date    CREATININE 1.19 11/03/2021    GLUCOSE 102 11/03/2021       Medical Decision Making-Imaging:     EXAMINATION:   2 X-RAY VIEWS OF THE LEFT TIBIA AND FIBULA; 2 X-RAY VIEWS OF THE LEFT FEMUR.    RULER WAS USED.       10/22/2021 9:46 am       COMPARISON:   Left knee radiographs dated 10/21/2021       HISTORY:   ORDERING SYSTEM PROVIDED HISTORY: leg length eval   TECHNOLOGIST PROVIDED HISTORY:   leg length eval   Reason for Exam: leg length eval; Best possible images at this time due to pt   condition . -JEK/PALOMO       FINDINGS:   Left femur: Left femoral head is not visualized due to overlying soft tissue   attenuation.  The superior tip of the greater trochanter is used as a   landmark.  Femoral length from the superior tip of the greater trochanter to   the medial femoral condyle is approximately 39.2 cm.       Left total knee arthroplasty is in place.  No acute fracture of the left   femur.  No evidence of hardware loosening.       Left leg: Distance from the medial femoral condyle to the mid tibial plafond   is 36 cm, which includes the polyethylene liner in the knee arthroplasty. (Total leg length of approximately 75.2 cm from superior tip of greater   trochanter to mid tibial plafond).  The liner measures up to 2 cm in   thickness.       No acute fracture involving the tibia or fibula.  No periprosthetic lucency   in the proximal tibia.  Ankle mortise alignment is preserved.  Scattered   dystrophic calcifications in the leg.           Impression   1.  No acute osseous abnormality in the left femur or tibia/fibula.       2.  Left knee arthroplasty in place without evidence for hardware loosening.       3.  Leg length measurements provided above.  Please note limitations and   landmarks used.         CT extremity lower left with contrast    Result Date: 10/18/2021  History: Acute left lower leg and ankle redness Exam/Technique: Thin axial images through the left lower extremity were obtained from the superior aspect of the acetabulum to the left foot following the intravenous demonstration of 100 mL Omnipaque 300. Study was supplemented by sagittal and coronal reconstructed images. Comparison: None Findings:  There is a left knee arthroplasty in place. Full evaluation of the area of the knee is compromised by extensive metallic artifact. There is a joint effusion of the left knee with fluid seen in the supra patellar bursa. There is no evidence for an acute osseous abnormality. No lytic or blastic processes are seen. No evidence of osteolysis demonstrated. No soft tissue masses or fluid collections are identified. IMPRESSION: 1. Left knee joint effusion with a left knee arthroplasty in place. 2. Otherwise normal CT of the left lower extremity. Workstation:[id number] Finalized by Chris Michael MD on 10/18/2021     Medical Decision Obvbpb-Rjppheri-Bwhfh:   Microbiology Results   Procedure Component Value Units Date/Time   Body fluid culture includes gram stain [[id number]] (Abnormal) Collected: 10/18/21 1210   Specimen: Fluid Updated: 10/19/21 1252   Gram Stain Result WHITE BLOOD CELLS PRESENT   FEW GRAM POSITIVE COCCI   QUANTITY NOT SUFFICIENT TO CONCENTRATE INTERPRET RESULTS WITH CAUTION   A Negative report does not exclude the possibility of infection because results are dependent on adequate specimen collection. Culture RARE STREPTOCOCCUS AGALACTIAE (GROUP B)   SARS COV 2 (COVID-19) [employer] ID Onsite Test [[id number]] Collected: 10/18/21 0253   Specimen: Nasopharynx Updated: 10/18/21 0335   Specimen Naso Pharynx   Sent to Testing to be performed at [address] [place] [address]. COVID-19 [facility] Labs Report to Follow. SARS CoV 2 [[id number]] Collected: 10/18/21 0253   Specimen: Nasopharynx Updated: 10/18/21 0336   First Test? UNKNOWN   Employed in Healthcare? UNKNOWN   Symptoms Defined by CDC? NO   Symptom Onset? U^UNKNOWN   Hospitalized for Covid? UNKNOWN   ICU for Covid? UNKNOWN   Congregate Setting? UNKNOWN   Pregnant?  NO   Specimen Type Naso Pharynx   SARS COV 2 Presumptive Negative     Medical Decision Making-Other:     Note:  Labs, medications, radiologic studies were reviewed with personal review of films  Large amounts of data were reviewed  Discussed with nursing Staff, Discharge planner  Infection Control and Prevention measures reviewed  All prior entries were reviewed  Administer medications as ordered  Prognosis: Guarded  Discharge planning reviewed  Follow up as outpatient. Thank you for allowing us to participate in the care of this patient. Please call with questions. Meredith Casiano MD, PGY-1  Infectious Disease/ Internal Medicine Resident  [place], [place]      ATTESTATION:    I have discussed the case, including pertinent history and exam findings with the residents and students. I have seen and examined the patient and the key elements of the encounter have been performed by me. I was present when the student obtained his information or examined the patient. I have reviewed the laboratory data, other diagnostic studies and discussed them with the residents. I have updated the medical record where necessary. I agree with the assessment, plan and orders as documented by the resident/ student.     Chandan Tarango MD.    Pager: [phone] - Office: [phone]

## 2021-11-04 VITALS
RESPIRATION RATE: 15 BRPM | DIASTOLIC BLOOD PRESSURE: 59 MMHG | HEIGHT: 64 IN | SYSTOLIC BLOOD PRESSURE: 136 MMHG | TEMPERATURE: 98.3 F | HEART RATE: 85 BPM | OXYGEN SATURATION: 92 % | BODY MASS INDEX: 50.02 KG/M2 | WEIGHT: 293 LBS

## 2021-11-04 LAB — GLUCOSE BLD-MCNC: 103 MG/DL (ref 65–105)

## 2021-11-04 PROCEDURE — 6370000000 HC RX 637 (ALT 250 FOR IP)

## 2021-11-04 PROCEDURE — 6360000002 HC RX W HCPCS: Performed by: INTERNAL MEDICINE

## 2021-11-04 PROCEDURE — 82947 ASSAY GLUCOSE BLOOD QUANT: CPT

## 2021-11-04 PROCEDURE — 6370000000 HC RX 637 (ALT 250 FOR IP): Performed by: INTERNAL MEDICINE

## 2021-11-04 PROCEDURE — 2580000003 HC RX 258: Performed by: INTERNAL MEDICINE

## 2021-11-04 PROCEDURE — 6370000000 HC RX 637 (ALT 250 FOR IP): Performed by: NURSE PRACTITIONER

## 2021-11-04 PROCEDURE — 99239 HOSP IP/OBS DSCHRG MGMT >30: CPT | Performed by: INTERNAL MEDICINE

## 2021-11-04 RX ORDER — FUROSEMIDE 40 MG/1
40 TABLET ORAL DAILY
Qty: 60 TABLET | Refills: 3 | Status: SHIPPED | OUTPATIENT
Start: 2021-11-04

## 2021-11-04 RX ORDER — CARVEDILOL 3.12 MG/1
3.12 TABLET ORAL 2 TIMES DAILY WITH MEALS
Qty: 60 TABLET | Refills: 3 | Status: SHIPPED | OUTPATIENT
Start: 2021-11-04

## 2021-11-04 RX ORDER — OXYCODONE HYDROCHLORIDE AND ACETAMINOPHEN 5; 325 MG/1; MG/1
2 TABLET ORAL EVERY 8 HOURS PRN
Qty: 15 TABLET | Refills: 0 | Status: SHIPPED | OUTPATIENT
Start: 2021-11-04 | End: 2021-11-07

## 2021-11-04 RX ORDER — AMLODIPINE BESYLATE 10 MG/1
10 TABLET ORAL DAILY
Qty: 30 TABLET | Refills: 3 | Status: SHIPPED | OUTPATIENT
Start: 2021-11-04

## 2021-11-04 RX ADMIN — AMLODIPINE BESYLATE 10 MG: 10 TABLET ORAL at 08:47

## 2021-11-04 RX ADMIN — ENOXAPARIN SODIUM 30 MG: 30 INJECTION SUBCUTANEOUS at 08:47

## 2021-11-04 RX ADMIN — CEFTRIAXONE SODIUM 2000 MG: 2 INJECTION, POWDER, FOR SOLUTION INTRAMUSCULAR; INTRAVENOUS at 08:47

## 2021-11-04 RX ADMIN — FUROSEMIDE 40 MG: 40 TABLET ORAL at 08:48

## 2021-11-04 RX ADMIN — OXYCODONE HYDROCHLORIDE AND ACETAMINOPHEN 2 TABLET: 5; 325 TABLET ORAL at 08:47

## 2021-11-04 RX ADMIN — PANTOPRAZOLE SODIUM 40 MG: 40 TABLET, DELAYED RELEASE ORAL at 08:47

## 2021-11-04 RX ADMIN — CARVEDILOL 3.12 MG: 3.12 TABLET, FILM COATED ORAL at 08:47

## 2021-11-04 RX ADMIN — METFORMIN HYDROCHLORIDE 1000 MG: 500 TABLET ORAL at 08:47

## 2021-11-04 ASSESSMENT — PAIN SCALES - GENERAL: PAINLEVEL_OUTOF10: 8

## 2021-11-04 NOTE — CARE COORDINATION
Faxed H&P,AVS,HENS,scripts and MAR to [facility] [phone].      Discharge [address] [facility] Case Management Department  Written by: William Singh RN    Patient Name: Silvestre Naidu  Attending Provider: Oneyda Wu DO  Admit Date: 10/21/2021 10:51 PM  MRN: [id number]  Account: [de-identified]                     : 1950  Discharge Date: 2021      Disposition: [facility] SHANNON Burk

## 2021-11-04 NOTE — PLAN OF CARE
Problem: Pain:  Goal: Pain level will decrease  Description: Pain level will decrease  11/4/2021 0703 by Prudence Villarreal RN  Outcome: Ongoing  11/3/2021 1720 by Sam Brown RN  Outcome: Ongoing  Goal: Control of acute pain  Description: Control of acute pain  11/4/2021 0703 by Prudence Villarreal RN  Outcome: Ongoing  11/3/2021 1720 by Sam Brown RN  Outcome: Ongoing  Goal: Control of chronic pain  Description: Control of chronic pain  11/4/2021 0703 by Prudence Villarreal RN  Outcome: Ongoing  11/3/2021 1720 by Sam Brown RN  Outcome: Ongoing     Problem: Falls - Risk of:  Goal: Will remain free from falls  Description: Will remain free from falls  11/4/2021 0703 by Prudence Villarreal RN  Outcome: Ongoing  11/3/2021 1720 by Sam Brown RN  Outcome: Met This Shift  Goal: Absence of physical injury  Description: Absence of physical injury  11/4/2021 0703 by Prudence Villarreal RN  Outcome: Ongoing  11/3/2021 1720 by Sam Brown RN  Outcome: Met This Shift     Problem: Skin Integrity:  Goal: Will show no infection signs and symptoms  Description: Will show no infection signs and symptoms  11/4/2021 0703 by Prudence Villarreal RN  Outcome: Ongoing  11/3/2021 1720 by Sam Brown RN  Outcome: Ongoing  Goal: Absence of new skin breakdown  Description: Absence of new skin breakdown  11/4/2021 0703 by Prudence Villarreal RN  Outcome: Ongoing  11/3/2021 1720 by Sam Brown RN  Outcome: Ongoing     Problem: Musculor/Skeletal Functional Status  Goal: Highest potential functional level  11/4/2021 0703 by Prudence Villarreal RN  Outcome: Ongoing  11/3/2021 1720 by Sam Brown RN  Outcome: Ongoing     Problem: Nutrition  Goal: Optimal nutrition therapy  11/4/2021 0703 by Prudence Villarreal RN  Outcome: Ongoing  11/3/2021 1720 by Sam Brown RN  Outcome: Ongoing

## 2021-11-04 NOTE — PROGRESS NOTES
[facility]  Office: [address], DO, Tahirlizeth Bang, DO, Krissy Wolfo, DO, Юлия Herrera Blood, DO, Romelia Alvarez MD, Ehsan Brewer MD, Johny Zabala MD, Shefali Last MD, Michele Calvin MD, Celia Kumar MD, Sai Chisholm MD, Grey Duckworth MD, Donnelly Officer, DO, Jake Davis, DO, Mike Robin MD,  March , DO, Kyle Montelongo MD, Onel Martinez MD, Jg Yen MD, Amy Alfred MD, Quiana Henry MD, Leisa Vazquez MD, Carmen Wolf, [facility], [facility], CNP, Mimi Caal, CNP, Judah Olvera, CNS, Matilda Pa, [facility], Juventino Denise, CNP, Dorothy Whittington, CNP, Michael Yan, CNP, Gearline Precise, CNP, Sonya Dodge, CNP, Dung Hewitt PA-C, Ector Bowman, [facility], Hipolito Roberts, CNP, Samira Stephen, CNP, Carrillo Dong, CNP, Lilliam Jacob, CNP, Jennie Patiño, CNP, Robin Harvey, CNP, [facility], [address] [place] [address]    Progress Note    11/4/2021    8:28 AM    Name:   Farideh Montoya  MRN:     [id number]     Acct:      [de-identified]   Room:   [address] [place] [address] Day:  15  Admit Date:  10/21/2021 10:51 PM    PCP:   Zoraida Lujan  Code Status:  Full Code    Subjective:     C/C: arthritis  Interval History Status: not changed. Patient seen this morning, no complaints, received certification for SNF placement. Discharged with IV abx    Brief History:     80 y.o. female being seen for a left knee periprosthetic joint infection. S/p knee explant, Irrigation/debridement, with placement of static antibiotic spacer and ortho fusion nail on 10/27. Started on R ocephin 2 gm IV every 24 hours  For one month followed by long term oral supression therapy. Needs follow up with Dr. Gisella Flores in 3 weeks. Fixing electrolytes and anemia (see below)    Currently waiting on SNF placement.      Review of Systems:     Constitutional:  negative for chills, fevers, sweats  Respiratory:  negative for cough, dyspnea on exertion, shortness of breath, wheezing  Cardiovascular:  negative for chest pain, chest pressure/discomfort, lower extremity edema, palpitations  Gastrointestinal:  negative for abdominal pain, constipation, diarrhea, nausea, vomiting  Neurological:  negative for dizziness, headache has some light headedness. EXT: knee pain is improving, admits to decreased range of motion but doing well. Medications: Allergies: Allergies   Allergen Reactions    Bactrim [Sulfamethoxazole-Trimethoprim]     Codeine     Lisinopril Other (See Comments)     cough       Current Meds:   Scheduled Meds:    insulin glargine  38 Units SubCUTAneous Nightly    furosemide  40 mg Oral Daily    metFORMIN  1,000 mg Oral BID WC    polyethylene glycol  17 g Oral Daily    enoxaparin  30 mg SubCUTAneous BID    amLODIPine  10 mg Oral Daily    carvedilol  3.125 mg Oral BID WC    sodium chloride flush  5-40 mL IntraVENous 2 times per day    sodium chloride flush  5-40 mL IntraVENous 2 times per day    [Held by provider] losartan  100 mg Oral Daily    pantoprazole  40 mg Oral QAM AC    insulin lispro  0-12 Units SubCUTAneous TID WC    cefTRIAXone (ROCEPHIN) IV  2,000 mg IntraVENous Q24H    insulin lispro  0-6 Units SubCUTAneous Nightly     Continuous Infusions:    dextrose       PRN Meds: oxyCODONE-acetaminophen, acetaminophen, glucose, dextrose, glucagon (rDNA), dextrose, ondansetron **OR** ondansetron, acetaminophen **OR** acetaminophen    Data:     Past Medical History:   has a past medical history of Arthritis, Diabetes mellitus ([address] Hyperlipidemia, and Hypertension. Social History:   reports that she has never smoked. She does not have any smokeless tobacco history on file. She reports that she does not drink alcohol and does not use drugs. Family History: No family history on file.     Vitals:  BP (!) 136/59   Pulse 85   Temp 98.3 °F (36.8 °C) (Oral)   Resp 15   Ht 5' 4\" (1.626 m)   Wt 297 lb (134.7 kg)   SpO2 92%   BMI 50.98 kg/m²   Temp (24hrs), Av.5 °F (36.9 °C), Min:98.3 °F (36.8 °C), Max:98.7 °F (37.1 °C)    Recent Labs     21  1136 21  1605 21  0636   POCGLU 148* 164* 158* 103       I/O (24Hr): Intake/Output Summary (Last 24 hours) at 2021 0828  Last data filed at 2021 0700  Gross per 24 hour   Intake --   Output 1100 ml   Net -1100 ml       Labs:  Hematology:  Recent Labs     21  0921  0500 21  0452   WBC 6.0 5.0 5.5   RBC 2.61* 2.68* 2.70*   HGB 7.1* 7.1* 7.1*   HCT 23.8* 24.1* 23.8*   MCV 91.2 89.9 88.1   MCH 27.2 26.5 26.3   MCHC 29.8 29.5 29.8   RDW 14.9* 15.0* 15.2*    310 303   MPV 10.1 9.9 9.8   CRP  --   --  122.0*     Chemistry:  Recent Labs     21  0921  0500 21  0452   * 129* 134*   K 4.9 5.2 5.3   CL 95* 94* 96*   CO2 23 24 25   GLUCOSE 214* 110* 102*   BUN 31* 32* 35*   CREATININE 1.06* 1.19* 1.19*   MG 1.6 1.8 1.8   ANIONGAP 13 11 13   LABGLOM 51* 45* 45*   GFRAA >60 54* 54*   CALCIUM 8.4* 8.2* 8.3*   PHOS 5.1* 5.1* 4.6*     Recent Labs     21  0921  1157 21  0500 21  0641 213 21  0452 21  0659 21  1136 21  1605 21  21521  0636   LABALBU 2.3*  --  2.3*  --   --  2.5*  --   --   --   --   --    POCGLU  --    < >  --    < > 207*  --  142* 148* 164* 158* 103    < > = values in this interval not displayed. ABG:  Lab Results   Component Value Date    FIO2 INFORMATION NOT PROVIDED 10/30/2021     Lab Results   Component Value Date/Time    SPECIAL RT 4ML 10/21/2021 11:00 PM     Lab Results   Component Value Date/Time    CULTURE NO GROWTH 6 DAYS 10/21/2021 11:00 PM       Radiology:  XR FEMUR LEFT (MIN 2 VIEWS)    Result Date: 10/22/2021  1. No acute osseous abnormality in the left femur or tibia/fibula. 2.  Left knee arthroplasty in place without evidence for hardware loosening. 3.  Leg length measurements provided above.   Please note limitations and landmarks used. XR KNEE LEFT (3 VIEWS)    Result Date: 10/22/2021  1. Soft tissue edema and effusion. 2. No acute osseous abnormality. XR TIBIA FIBULA LEFT (2 VIEWS)    Result Date: 10/22/2021  1. No acute osseous abnormality in the left femur or tibia/fibula. 2.  Left knee arthroplasty in place without evidence for hardware loosening. 3.  Leg length measurements provided above. Please note limitations and landmarks used. XR CHEST PORTABLE    Result Date: 10/22/2021  1. Aberrant positioning of the right upper extremity PICC line. Tip is either within the distal left subclavian vein, or directed into the azygous vein. Repositioning is recommended 2. Report was marked to be called to a licensed caregiver     NM Cardiac Stress Test Nuclear Imaging    Result Date: 10/25/2021  Perfusion:  Stress-induced reversible defects as above involving 12% of the left ventricular myocardium at stress. Function:  Normal left ventricular ejection fraction of 55%. Mild perfusion defect in the apex and mid inferior wall. Risk stratification: HIGH DUE TO PERFUSION DEFECTS. Notes concerning risk stratification: Risk stratification incorporates both clinical history and some testing results. Final risk determination is the responsibility of the ordering provider as other patient information and test results may increase or decrease the risk assessment reported for this examination. Risk stratification criteria are adapted from \"Noninvasive Risk Stratification\" criteria from [employer]. Al, ACC/AATS/AHA/ASE/ASNC/SCAI/SCCT/STS 2017 Appropriate Use Criteria For Coronary Revascularization in Patients With Stable Ischemic Heart Disease [facility] Volume 69, Issue 17, May 2017 High risk (>3% annual death or MI) 1. Severe resting LV dysfunction (LVEF <35%) not readily explained by non coronary causes 2.  Resting perfusion abnormalities greater than 10% of the myocardium in patients without prior history or evidence of MI3. Stress-induced perfusion abnormalities encumbering greater than or equal to 10% myocardium or stress segmental scores indicating multiple vascular territories with abnormalities 4. Stress-induced LV dilatation (TID ratio greater than 1.19 for exercise and greater than 1.39 for regadenoson) Intermediate risk (1% to 3% annual death or MI) 1. Mild/moderate resting LV dysfunction (LVEF 35% to 49%) not readily explained by non coronary causes. 2. Resting perfusion abnormalities in 5%-9.9% of the myocardium in patients without a history or prior evidence of MI 3. Stress-induced perfusion abnormality encumbering 5%-9.9% of the myocardium or stress segmental scores indicating 1 vascular territory with abnormalities but without LV dilation 4. Small wall motion abnormality involving 1-2 segments and only 1 coronary bed. Low Risk (Less than 1% annual death or MI) 1. Normal or small myocardial perfusion defect at rest or with stress encumbering less than 5% of the myocardium. Physical Examination:        General appearance:  alert, cooperative and no distress, obese female. Mental Status:  oriented to person, place and time and normal affect  Lungs:  clear to auscultation bilaterally, normal effort  Heart:  regular rate and rhythm, no murmur  Abdomen:  soft, nontender, nondistended, normal bowel sounds, no masses, hepatomegaly, splenomegaly  Extremities:  no edema, redness, tenderness in the calves  Skin:  no gross lesions, rashes, induration. Knee incision c/d/i, drain in place.      Assessment:        Hospital Problems         Last Modified POA    * (Principal) Staphylococcal arthritis of left knee ([address] 10/22/2021 Yes    Type 2 diabetes mellitus without complication, without long-term current use of insulin ([address] 10/22/2021 Yes    PITA on CPAP 10/22/2021 Yes    CAD (coronary artery disease) 10/22/2021 Yes    COPD (chronic obstructive pulmonary disease) ([address] 10/22/2021 Yes    Primary hypertension 10/22/2021 Yes Acute kidney injury superimposed on CKD ([facility] 10/22/2021 Yes    Normocytic normochromic anemia 10/22/2021 Yes          Plan:        Left knee periprosthetic joint infection: S/p knee explant, Irrigation/debridement, with placement of static antibiotic spacer and ortho fusion nail on 10/27. Continue Rocephin 2 gm IV every 24 hours  For one month followed by long term oral supression therapy. Midline placed. Needs follow up with Dr. Kassidy Conteh in 3 weeks. Blood cultures NGDT . Hyperk- hold cozaar, normotensive. Caution with restarting   Post operative anemia: 2/2 blood loss anemia +/-Functional NOY. Received one unit of PRBC on 10/28 for anemia, tachycardia and hgb of 6.8. Tsat: 9, Ferritin 372. Since blood cultures negative and infection clearing, will start IV iron while waiting on placement, may be discharged on PO when bed available. Constipation: continue bowel regimen. Continue polyethylene glycol. Hyponatremia: 2/2 osmotic drag from hyperglycemia with component of SIADH due to pain/sepsis. Urine sodium 63, urine osm 403. Diabetes mellitus type II with Hyperglycema:  Lantus 38 units and restarted Metformin. QHS, ISS  Morbid Obesity BMI of 51: recommend weight loss and lifestyle modification  Uncontrolled Hypertension-improving: continue home antihypertensive regimen.  Continue Coreg 3.125, Norvasc  Suspected PITA: needs outpt sleep study  DVT ppx  PT/OT    Dispo: dc today, continue abx until until 11/20/2021        Edward Reynoso,   11/4/2021  8:28 AM

## 2021-11-04 NOTE — PLAN OF CARE
Problem: Pain:  Goal: Pain level will decrease  Description: Pain level will decrease  11/4/2021 0927 by Regino Shankar RN  Outcome: Completed  11/4/2021 0703 by Amy Pompa RN  Outcome: Ongoing  Goal: Control of acute pain  Description: Control of acute pain  11/4/2021 0927 by Regino Shankar RN  Outcome: Completed  11/4/2021 0703 by Amy Pompa RN  Outcome: Ongoing  Goal: Control of chronic pain  Description: Control of chronic pain  11/4/2021 0927 by Regino Shankar RN  Outcome: Completed  11/4/2021 0703 by Amy Pompa RN  Outcome: Ongoing     Problem: Falls - Risk of:  Goal: Will remain free from falls  Description: Will remain free from falls  11/4/2021 0927 by Regino Shankar RN  Outcome: Completed  11/4/2021 0703 by Amy Pompa RN  Outcome: Ongoing  Goal: Absence of physical injury  Description: Absence of physical injury  11/4/2021 0927 by Regino Shankar RN  Outcome: Completed  11/4/2021 0703 by Amy Pompa RN  Outcome: Ongoing     Problem: Skin Integrity:  Goal: Will show no infection signs and symptoms  Description: Will show no infection signs and symptoms  11/4/2021 0927 by Regino Shankar RN  Outcome: Completed  11/4/2021 0703 by Amy Pompa RN  Outcome: Ongoing  Goal: Absence of new skin breakdown  Description: Absence of new skin breakdown  11/4/2021 0927 by Regino Shankar RN  Outcome: Completed  11/4/2021 0703 by Amy Pompa RN  Outcome: Ongoing     Problem: Musculor/Skeletal Functional Status  Goal: Highest potential functional level  11/4/2021 0927 by Regino Shankar RN  Outcome: Completed  11/4/2021 0703 by Amy Pompa RN  Outcome: Ongoing     Problem: Nutrition  Goal: Optimal nutrition therapy  11/4/2021 0927 by Regino Shankar RN  Outcome: Completed  11/4/2021 0703 by Amy Pompa RN  Outcome: Ongoing

## 2021-11-04 NOTE — DISCHARGE SUMMARY
[facility]  Office: [address], DO, Mati Doe DO, Neville Payton, DO, Kylie Lyons, DO, Gavi El MD, Divine Willett MD, Sharon Pantoja MD, Melvin Starr MD, Nelida White MD, Alek Banerjee MD, Kristian Sharma MD, Alicia Estrada MD, Manasa Oropeza, DO, Vida Pedroza DO, Tuyet Salinas MD,  Rebekah Fox DO, Jena Mei MD, Joyce Power MD, Char Kraus MD, Monet Dangelo MD, Melvin Boucher MD, Harpreet Sheffield MD, Mati Donahue, [facility], [facility], CNP, Matias Sauceda, CNP, Ana Hammer, CNS, Sandor Bland, CNP, Óscar Hurtado, CNP, Kai Dominique, CNP, Jraen Hart, CNP, Nishant Frazier, CNP, Anabell Bay, CNP, Carlos Guillaume PA-C, Víctor Young, [facility], [facility], [facility], Bert Geovanny, CNP, Canelo Cancel, CNP, Yuri Car, CNP, Jaspal Fermo, CNP, Dominga Garzael, CNP, Cruz Rader,  [facility]    Discharge Summary     Patient ID: Julio C Lackey  :  1950   MRN: [id number]     ACCOUNT:  [de-identified]   Patient's PCP: Lalita Mccollum Date: 10/21/2021   Discharge Date: 2021     Length of Stay: 14  Code Status:  Full Code  Admitting Physician: Vida Pedroza DO  Discharge Physician: Vida Pedroza DO     Active Discharge Diagnoses:     Hospital Problem Lists:  Principal Problem:    Staphylococcal arthritis of left knee [facility])  Active Problems:    Type 2 diabetes mellitus without complication, without long-term current use of insulin (HCC)    PITA on CPAP    CAD (coronary artery disease)    COPD (chronic obstructive pulmonary disease) ([facility]    Primary hypertension    Acute kidney injury superimposed on CKD ([facility]    Normocytic normochromic anemia  Resolved Problems:    * No resolved hospital problems.  *      Admission Condition:  good     Discharged Condition: good    Hospital Stay:     Hospital Course:  80 y.o. female being seen for a left knee periprosthetic joint infection.      S/p knee 8.3 11/03/2021     PT/INR:  No results found for: PTINR, PROTIME, INR  PTT: No results found for: APTT  FLP:  No results found for: CHOL, TRIG, HDL   Radiology:  CT CHEST W CONTRAST    Result Date: 11/1/2021  1. Mildly enlarged right hilar lymph nodes. Mild cardiomegaly. Other shotty mediastinal lymph nodes are present. 2.  Nonspecific periaortic lymph node and portal lymph nodes. 3.  Pleural thickening and chronic changes in the lungs without significant intraparenchymal nodules. XR CHEST PORTABLE    Result Date: 10/30/2021  Cardiomegaly and vascular congestion. Right hilar prominence suspicious for enlarged lymph nodes or mass. RECOMMENDATION: CT scanning of the chest may prove helpful for further assessment of the hilar structures. FL MODIFIED BARIUM SWALLOW W VIDEO    Result Date: 11/3/2021  No aspiration. Flash penetration with thin liquid consistency which resolved with chin tuck maneuver. Please see separate speech pathology report for full discussion of findings and recommendations. Consultations:    Consults:     Final Specialist Recommendations/Findings:   IP CONSULT TO SOCIAL WORK  IP CONSULT TO INFECTIOUS DISEASES  IP CONSULT TO ORTHOPEDIC SURGERY  PHARMACY CONSULT FOR RENAL DOSING  IP CONSULT TO CARDIOLOGY  IP CONSULT TO IV TEAM  IP CONSULT TO IV TEAM  IP CONSULT TO IV TEAM      The patient was seen and examined on day of discharge and this discharge summary is in conjunction with any daily progress note from day of discharge. Discharge plan:     Disposition:  To a non-[facility] facility    Physician Follow Up:   Mahsa Max, [address] [address] [address] [facility]  [phone]    Schedule an appointment as soon as possible for a visit in 3 weeks  Infectious disease follow up    Kun Berman, [address] [facility] [id number] [place] [address] [address] [place] [address] [address] [facility]  [phone]    In 2 weeks      [place]  [phone]    Schedule an appointment as soon as possible for a visit in 1 week  hospital followup       Requiring Further Evaluation/Follow Up POST HOSPITALIZATION/Incidental Findings: none    Diet: regular diet    Activity: As tolerated    Instructions to Patient: none    Discharge Medications:      Medication List      START taking these medications    amLODIPine 10 MG tablet  Commonly known as: NORVASC  Take 1 tablet by mouth daily     carvedilol 3.125 MG tablet  Commonly known as: COREG  Take 1 tablet by mouth 2 times daily (with meals)     cefTRIAXone  infusion  Commonly known as: ROCEPHIN  Infuse 2,000 mg intravenously every 24 hours for 16 days Compound per protocol     furosemide 40 MG tablet  Commonly known as: LASIX  Take 1 tablet by mouth daily     oxyCODONE-acetaminophen 5-325 MG per tablet  Commonly known as: PERCOCET  Take 2 tablets by mouth every 8 hours as needed for Pain for up to 3 days.         CONTINUE taking these medications    clopidogrel 75 MG tablet  Commonly known as: PLAVIX     fenofibrate 160 MG tablet  Commonly known as: TRIGLIDE     insulin glargine 100 UNIT/ML injection vial  Commonly known as: LANTUS     insulin lispro 100 UNIT/ML injection vial  Commonly known as: HUMALOG     omeprazole 20 MG delayed release capsule  Commonly known as: PRILOSEC        STOP taking these medications    losartan 100 MG tablet  Commonly known as: COZAAR     metFORMIN 1000 MG tablet  Commonly known as: GLUCOPHAGE           Where to Get Your Medications      These medications were sent to [address] [address]    Phone: [phone]   amLODIPine 10 MG tablet  carvedilol 3.125 MG tablet  furosemide 40 MG tablet     You can get these medications from any pharmacy    Bring a paper prescription for each of these medications  cefTRIAXone  infusion  oxyCODONE-acetaminophen 5-325 MG per tablet         No discharge procedures on file.    Time Spent on discharge is  40 mins in patient examination, evaluation, counseling as well as medication reconciliation, prescriptions for required medications, discharge plan and follow up. Electronically signed by   Roseanna Preston DO  11/4/2021  8:27 AM      Thank you Dr. Estrellita Pichardo for the opportunity to be involved in this patient's care.

## 2021-11-09 ENCOUNTER — TELEPHONE (OUTPATIENT)
Dept: INFECTIOUS DISEASES | Age: 71
End: 2021-11-09

## 2021-11-09 DIAGNOSIS — T84.54XA INFECTION ASSOCIATED WITH INTERNAL LEFT KNEE PROSTHESIS, INITIAL ENCOUNTER (HCC): Primary | ICD-10-CM

## 2021-11-09 NOTE — TELEPHONE ENCOUNTER
Dr. Charlie Gilbert- pt is scheduled with you on 11/18//21. Brian Mckeon from the nursing facility called and wondered if you want the patient to get labs done prior to her visit. Please advise.  Thank you. Missy Cruz

## 2021-11-09 NOTE — TELEPHONE ENCOUNTER
González Monroe, APRN - CNP  You 1 hour ago (3:09 PM)     HH    BMP, CBC, Blood cultures X 2 CRP please. Thanks!     Message text

## 2021-11-11 ENCOUNTER — TELEPHONE (OUTPATIENT)
Dept: ORTHOPEDIC SURGERY | Age: 71
End: 2021-11-11

## 2021-11-11 NOTE — TELEPHONE ENCOUNTER
Left KNEE EXPLANT, IRRIGATION & DEBRIDEMENT, STATIC ANTIBIOTIC SPACER, LINK ORTHO FUSION NAIL SET- 10/26/2021   Nurse from [facility] called stating the wound vac is \"not working, sucking. \" I had her leave it on for now. She has a post-op appointment with [facility] on Monday. Please advise if you would like me to call them back and have her do something different.

## 2021-11-12 DIAGNOSIS — M00.062 STAPHYLOCOCCAL ARTHRITIS OF LEFT KNEE (HCC): Primary | ICD-10-CM

## 2021-11-15 LAB
BASOPHILS ABSOLUTE: NORMAL
BASOPHILS RELATIVE PERCENT: NORMAL
BUN BLDV-MCNC: NORMAL MG/DL
C-REACTIVE PROTEIN: NORMAL
CALCIUM SERPL-MCNC: NORMAL MG/DL
CHLORIDE BLD-SCNC: NORMAL MMOL/L
CO2: NORMAL
CREAT SERPL-MCNC: NORMAL MG/DL
EOSINOPHILS ABSOLUTE: NORMAL
EOSINOPHILS RELATIVE PERCENT: NORMAL
GFR CALCULATED: NORMAL
GLUCOSE BLD-MCNC: NORMAL MG/DL
HCT VFR BLD CALC: NORMAL %
HEMOGLOBIN: NORMAL
LYMPHOCYTES ABSOLUTE: NORMAL
LYMPHOCYTES RELATIVE PERCENT: NORMAL
MCH RBC QN AUTO: NORMAL PG
MCHC RBC AUTO-ENTMCNC: NORMAL G/DL
MCV RBC AUTO: NORMAL FL
MONOCYTES ABSOLUTE: NORMAL
MONOCYTES RELATIVE PERCENT: NORMAL
NEUTROPHILS ABSOLUTE: NORMAL
NEUTROPHILS RELATIVE PERCENT: NORMAL
PLATELET # BLD: NORMAL 10*3/UL
PMV BLD AUTO: NORMAL FL
POTASSIUM SERPL-SCNC: NORMAL MMOL/L
RBC # BLD: NORMAL 10*6/UL
SODIUM BLD-SCNC: NORMAL MMOL/L
WBC # BLD: NORMAL 10*3/UL

## 2021-11-16 DIAGNOSIS — M00.062 STAPHYLOCOCCAL ARTHRITIS OF LEFT KNEE (HCC): Primary | ICD-10-CM

## 2021-11-17 ENCOUNTER — OFFICE VISIT (OUTPATIENT)
Dept: ORTHOPEDIC SURGERY | Age: 71
End: 2021-11-17

## 2021-11-17 VITALS — WEIGHT: 293 LBS | HEIGHT: 64 IN | BODY MASS INDEX: 50.02 KG/M2

## 2021-11-17 DIAGNOSIS — M00.062 STAPHYLOCOCCAL ARTHRITIS OF LEFT KNEE (HCC): Primary | ICD-10-CM

## 2021-11-17 PROCEDURE — 99024 POSTOP FOLLOW-UP VISIT: CPT | Performed by: PHYSICIAN ASSISTANT

## 2021-11-17 ASSESSMENT — ENCOUNTER SYMPTOMS
COLOR CHANGE: 0
SHORTNESS OF BREATH: 0
VOMITING: 0
COUGH: 0

## 2021-11-17 NOTE — PROGRESS NOTES
[facility]  [facility]  [address] [facility]  Dept: [phone]  Dept Fax: [phone]        Postoperative follow-up note    Subjective:   Lisa Roberts is a 80y.o. year old female who presents to our office today for postoperative followup regarding her   1. Staphylococcal arthritis of left knee [facility])        Chief Complaint   Patient presents with    Post-Op Check     Left knee explant and I&D on 10/26/2021       Date of Surgery: 10/26/2021    Lisa Roberts  is a 80y.o. year old female who presents to our office today for postoperative follow up after having undergone a left knee explant with irrigation and debridement, with placement of static antibiotic spacer on 10/26/2021 with Dr. Manju Biggs DO. The patient denies fevers, chills, nausea, vomiting, diarrhea. The patient has started physical therapy. Patient is currently staying at [facility] nursing [facility] in [place]. She notes that she has begun physical therapy but has been extremely hesitant to place any weight on the left lower extremity. Patient notes that she is primarily been laying in bed but will sit up to eat her meals. She notes that she was unaware that she could place weight on the left lower extremity. Her discharge notes did state partial weightbearing on the left lower extremity. Patient was placed on IV ceftriaxone 2 g daily per infectious disease. The patient notes that she did have issues with her PICC line and did not get the antibiotics for 24 hours then got the PICC line replaced recently. She has restarted her IV antibiotics. Patient has a follow-up with infectious disease and Dr. Gonzalo Lu tomorrow 11/18/2021. Patient notes that there was not an area of redness on the lateral side of the incision earlier today. She just noticed the area after reremoved the dressing.     She notes that the wound VAC stopped working on 11/11/2021 and her nursing staff removed it. Have been using this sterile saline and ABD pads over the incision daily. Patient notes she still has sutures in place. She notes significant pain within the right knee if she twists the leg. She denies numbness and or tingling in the left lower extremity. Review of Systems   Constitutional: Negative for activity change and fever. HENT: Negative for sneezing. Respiratory: Negative for cough and shortness of breath. Cardiovascular: Negative for chest pain. Gastrointestinal: Negative for vomiting. Musculoskeletal: Positive for arthralgias (left knee ) and joint swelling (left knee). Negative for myalgias. Skin: Negative for color change. Neurological: Negative for weakness and numbness. Psychiatric/Behavioral: Negative for sleep disturbance. Objective :   General: Keith Chambers is a 80 y.o. female who is alert and oriented and sitting comfortably in our office. Ortho Exam  MS: Patient arrives seated in wheelchair. Patient was unwilling to stand during appointment. Patient arrived the ABD pad over the surgical incision on the left knee. After removal of the dressing, evaluation of the left knee reveals intact sutures along the anterior aspect of the left knee. There is a small area of erythema noted approximately two thirds down the surgical incision on the lateral aspect. The area of erythema is blanchable with palpation. There is no open wound or incision drainage noted. Chronic lymphedema changes noted the distal aspect of the left lower extremity (Pictures below). The patient is able to actively complete a straight leg raise while seated. She is able to flex and dorsiflex the left ankle. Left calf is supple and nontender with palpation. Sensation is intact to light touch to the left lower extremity without focal deficits present. The skin is noted to be pink and warm brisk capillary refill distally. Neuro: alert.  oriented  Eyes: Extra-ocular muscles intact  Mouth: Oral mucosa moist. No perioral lesions  Pulm: Respirations unlabored and regular. Skin: warm, well perfused  Psych:   Patient has good fund of knowledge and displays understanging of exam, diagnosis, and plan. Radiology:  Please refer to radiology read from 11/17/2021 for most recent imaging result. Assessment:      1. Staphylococcal arthritis of left knee (HCC)         Plan:      The patient is currently 3 weeks postoperative after undergoing a left total knee explant with irrigation and debridement and static antibiotic spacer placement completed on 10/26/2021 by Dr. Manish Boyd DO due to staphylococcal arthritis within the left knee. I personally reviewed the patient's left knee x-rays today in office and compared them to post-operative x-rays from 10/26/2021. There is a linear lucency noted along the posterior/proximal aspect of the tibia, which is best seen on the lateral image, the lucency is unchanged since the previous x-ray on 10/26/2021. Otherwise the hardware within the left knee is stable without signs of loosening. The patient is currently staying at [facility] in [facility]. At this time would like the patient to continue facility physical therapy. She may be partial weightbearing on the left lower extremity while utilizing a walker. Her sutures were left in place to continue allow the surgical incision to heal.  Patient is to continue IV antibiotics as prescribed by infectious disease. Follow-up with Dr. Fady Perez on 11/18/2021 with Infectious Disease. The patient may continue to take her oral pain medications as prescribed by our office. The dressing on the surgical incision should be changed daily or more often if needed. The nursing staff was instructed to call our office if they noticed any incision redness, drainage, wound dehiscence or signs of infection to the left lower extremity.   A letter was

## 2021-11-18 ENCOUNTER — OFFICE VISIT (OUTPATIENT)
Dept: INFECTIOUS DISEASES | Age: 71
End: 2021-11-18
Payer: COMMERCIAL

## 2021-11-18 ENCOUNTER — TELEPHONE (OUTPATIENT)
Dept: INFECTIOUS DISEASES | Age: 71
End: 2021-11-18

## 2021-11-18 VITALS
SYSTOLIC BLOOD PRESSURE: 144 MMHG | OXYGEN SATURATION: 100 % | DIASTOLIC BLOOD PRESSURE: 71 MMHG | HEART RATE: 77 BPM | RESPIRATION RATE: 16 BRPM | BODY MASS INDEX: 44.73 KG/M2 | HEIGHT: 64 IN | WEIGHT: 262 LBS | TEMPERATURE: 98.7 F

## 2021-11-18 DIAGNOSIS — E11.9 TYPE 2 DIABETES MELLITUS WITHOUT COMPLICATION, WITHOUT LONG-TERM CURRENT USE OF INSULIN (HCC): ICD-10-CM

## 2021-11-18 DIAGNOSIS — M00.062 STAPHYLOCOCCAL ARTHRITIS OF LEFT KNEE (HCC): Primary | ICD-10-CM

## 2021-11-18 PROCEDURE — 99214 OFFICE O/P EST MOD 30 MIN: CPT | Performed by: INTERNAL MEDICINE

## 2021-11-18 RX ORDER — OXYCODONE HYDROCHLORIDE AND ACETAMINOPHEN 5; 325 MG/1; MG/1
1 TABLET ORAL EVERY 4 HOURS PRN
COMMUNITY

## 2021-11-18 NOTE — PROGRESS NOTES
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 Stop date 11-22-21)  · Long term oral antibiotic suppression following the above with Keflex 500 mg po qid to start on 11/23/21  · Orthopedic surgery following-  S/p  explantation and antibiotic spacer device placement 10-26-21. · Office f/up in 2-3 weeks with Dr Jocelyne Hatchet for infection. Please call [phone] for appointment and coordinate with follow up visit with Orthopedics. Medical Decision Making/Summary/Discussion:11/18/2021     ·   Infection Control Recommendations   · Universal Precautions  ·     Antimicrobial Stewardship Recommendations     · Simplification of therapy    Coordination of Outpatient Care:   · Estimated Length of IV antimicrobials:4 weeks  · Patient will need Midline Catheter Insertion: Yes  · Patient will need PICC line Insertion:No  · Patient will need: Home IV , [place],  SNF,  LTAC: TBD  · Patient will need outpatient wound care:Yes    Chief complaint/reason for consultation:   · Lt knee pyogenic arthritis      History of Present Illness:   Melvin Dobson is a 80y.o.-year-old female who was initially admitted on (Not on file). Patient seen at the request of Dr. Vaishnavi Metzger:    Patient with a Hx of prior Lt TKA  In 2007 at [facility] under Dr Salina Cabral, She subsequently experienced  Lt knee infections which required additional surgeries x 2. She has underlying DM 2, Essential HTN, CKD,PITA and COPD.      Presented to [facility] because of acute onset of pain with ambulation. The knee joint was aspirated and grew Strep. Agalactiae. She was started on antibiotics (ceftriaxone) and was awaiting evaluation at tertiary care center because of her Hx of prior surgeries and infections. [facility] refused transfer. She presented to [facility] because of ongoing severe pain, erythema and inflammation. Ortho has evaluated and plan intervention on 10-25-21. Had pre-op evaluation by Cardiology including stress test.  Patient had positive stress test.   S/p left heart cath 10/26: normal coronary arteries, preserved LV systolic function    Orthopedic Surgery performed  Left knee I&D with fusion nail on 10/26/21 static antibiotic explant/spacer placement. Patient has been doing well post-op. Will be going to SNF on 11-3-21 to continue antibiotic treatment and rehab. Denies any fever, chills, nausea, vomiting  She will need IV ceftriaxone x 4 weeks and then long term oral antibiotic suppression. Will coordinate follow up visit with time she comes to see Ortho for follow up. Office Visit 11/18/21:    Upon evaluation the patient was complaining of some tenderness to the left lateral knee. The surgical site appears clean without signs of infection. There is some redness to the lower region of the surgical site that appears to be contact dermatitis from the adhesive dressing. If possible, the dressing should be changed to an ABD with Kerlix wrapped around the knee and ACE wrap to keep the dressing in place. The patient is following up with Dr. Joshua Coleman in about a week. We can follow-up with the patient in 2 to 3 weeks. Her IV Ceftriaxone will be completed on 11/22/21 and she will need to start on Keflex on 11/23/21 for chronic suppression therapy.        11/18/21            Left knee 11/17/21    Left knee 11/17/21      Labs, X rays reviewed: 11/18/2021    11/15/21:    CRP: 4.3  WBC: 3.9  Hgb: 8.3  BUN: 35  Creat: 1.23    Cultures:  Urine:  ·   Blood:  · 10/21/21: no growth  · 11/10/21: Not completed  Sputum :  ·   Wound:  · Knee aspirate: Strep agalactiae      COVID rapid 10/22/21: negative    Discussed with patient, RN, IM. I have personally reviewed the past medical history, past surgical history, medications, social history, and family history, and I have updated the database accordingly. Past Medical History:     Past Medical History:   Diagnosis Date    Arthritis     Diabetes mellitus ([address]     Hyperlipidemia     Hypertension        Past Surgical  History:     Past Surgical History:   Procedure Laterality Date    HYSTERECTOMY      KNEE SURGERY Left 10/26/2021    KNEE EXPLANT, IRRIGATION & DEBRIDEMENT, STATIC ANTIBIOTIC SPACER, LINK ORTHO FUSION NAIL SET    REVISION TOTAL KNEE ARTHROPLASTY Left 10/26/2021    KNEE EXPLANT, IRRIGATION & DEBRIDEMENT, STATIC ANTIBIOTIC SPACER, LINK ORTHO FUSION NAIL SET- performed by Heather Alaniz DO at [facility] OR       Medications:         Social History:     Social History     Socioeconomic History    Marital status:      Spouse name: Not on file    Number of children: Not on file    Years of education: Not on file    Highest education level: Not on file   Occupational History    Not on file   Tobacco Use    Smoking status: Never Smoker    Smokeless tobacco: Never Used   Substance and Sexual Activity    Alcohol use: No    Drug use: No    Sexual activity: Not on file   Other Topics Concern    Not on file   Social History Narrative    Not on file     Social Determinants of Health     Financial Resource Strain:     Difficulty of Paying Living Expenses: Not on file   Food Insecurity:     Worried About Running Out of Food in the Last Year: Not on file    Katie of Food in the Last Year: Not on file   Transportation Needs:     Lack of Transportation (Medical): Not on file    Lack of Transportation (Non-Medical):  Not on file   Physical Activity:     Days of Exercise per Week: Not on file    Minutes of Exercise per Session: Not on file   Stress:     Feeling of Stress : Not on file   Social Connections:     Frequency of Communication with Friends and Family: Not on file    Frequency of Social Gatherings with Friends and Family: Not on file    Attends [employer] Services: Not on file    Active Member of Clubs or Organizations: Not on file    Attends Club or Organization Meetings: Not on file    Marital Status: Not on file   Intimate Partner Violence:     Fear of Current or Ex-Partner: Not on file    Emotionally Abused: Not on file    Physically Abused: Not on file    Sexually Abused: Not on file   Housing Stability:     Unable to Pay for Housing in the Last Year: Not on file    Number of [place] in the Last Year: Not on file    Unstable Housing in the Last Year: Not on file       Family History:   No family history on file. Allergies:   Bactrim [sulfamethoxazole-trimethoprim], Codeine, and Lisinopril     Review of Systems:   Constitutional: No fevers or chills. No systemic complaints  Head: No headaches  Eyes: No double vision or blurry vision. No conjunctival inflammation. ENT: No sore throat or runny nose. . No hearing loss, tinnitus or vertigo. Cardiovascular: No chest pain or palpitations. No shortness of breath. No FRANCES  Lung: No shortness of breath or cough. No sputum production  Abdomen: No nausea, vomiting, diarrhea, or abdominal pain. Valorie Stands No cramps. Genitourinary: No increased urinary frequency, or dysuria. No hematuria. No suprapubic or CVA pain  Musculoskeletal: No muscle aches or pains. No joint effusions, swelling or deformities. Lt knee effusion  Hematologic: No bleeding or bruising. Neurologic: No headache, weakness, numbness, or tingling. Integument: No rash, no ulcers. Lt knee erythema  Psychiatric: No depression. Endocrine: No polyuria, no polydipsia, no polyphagia.     Physical Examination :     Patient Vitals for the past 8 hrs:   BP Pulse Resp SpO2 Height   11/03/21 1528 -- -- -- -- 5' 4\" (1.626 m)   11/03/21 0800 (!) 144/54 77 18 93 % --     General Appearance: Awake, alert, and in no apparent distress  Head:  Normocephalic, no trauma  Eyes: Pupils equal, round, reactive to light and accommodation; extraocular movements intact; sclera anicteric; conjunctivae pink. No embolic phenomena. ENT: Oropharynx clear, without erythema, exudate, or thrush. No tenderness of sinuses. Mouth/throat: mucosa pink and moist. No lesions. Dentition in good repair. Neck:Supple, without lymphadenopathy. Thyroid normal, No bruits. Pulmonary/Chest: Clear to auscultation, without wheezes, rales, or rhonchi. No dullness to percussion. Cardiovascular: Regular rate and rhythm without murmurs, rubs, or gallops. Abdomen: Soft, non tender. Bowel sounds normal. No organomegaly  All four Extremities: No cyanosis, clubbing, edema. Lt knee effusion and erythema. Neurologic: No gross sensory or motor deficits. Skin: Warm and dry with good turgor. No signs of peripheral arterial or venous insufficiency. No ulcerations. No open wounds. Medical Decision Making -Laboratory:   I have independently reviewed/ordered the following labs:    CBC with Differential:   No results for input(s): WBC, HGB, HCT, PLT, SEGSPCT, BANDSPCT, LYMPHOPCT, MONOPCT, EOSPCT in the last 72 hours. BMP:   No results for input(s): NA, K, CL, CO2, BUN, CREATININE, MG in the last 72 hours. Invalid input(s): CA  Hepatic Function Panel:   No results for input(s): PROT, LABALBU, BILIDIR, IBILI, BILITOT, ALKPHOS, ALT, AST in the last 72 hours. No results for input(s): RPR in the last 72 hours. No results for input(s): HIV in the last 72 hours. No results for input(s): BC in the last 72 hours.   Lab Results   Component Value Date    MUCUS NOT REPORTED 10/22/2021    RBC 2.70 11/03/2021    TRICHOMONAS NOT REPORTED 10/22/2021    WBC 5.5 11/03/2021    YEAST NOT REPORTED 10/22/2021    TURBIDITY Clear 10/22/2021     Lab Results   Component Value Date    CREATININE 1.19 11/03/2021    GLUCOSE 102 11/03/2021       Medical Decision Making-Imaging:     EXAMINATION:   2 X-RAY VIEWS OF THE LEFT TIBIA AND FIBULA; 2 X-RAY VIEWS OF THE LEFT FEMUR. RULER WAS USED.       10/22/2021 9:46 am       COMPARISON:   Left knee radiographs dated 10/21/2021       HISTORY:   ORDERING SYSTEM PROVIDED HISTORY: leg length eval   TECHNOLOGIST PROVIDED HISTORY:   leg length eval   Reason for Exam: leg length eval; Best possible images at this time due to pt   condition . -JEK/GW       FINDINGS:   Left femur: Left femoral head is not visualized due to overlying soft tissue   attenuation. The superior tip of the greater trochanter is used as a   landmark. Femoral length from the superior tip of the greater trochanter to   the medial femoral condyle is approximately 39.2 cm. Left total knee arthroplasty is in place. No acute fracture of the left   femur. No evidence of hardware loosening. Left leg: Distance from the medial femoral condyle to the mid tibial plafond   is 36 cm, which includes the polyethylene liner in the knee arthroplasty. (Total leg length of approximately 75.2 cm from superior tip of greater   trochanter to mid tibial plafond). The liner measures up to 2 cm in   thickness. No acute fracture involving the tibia or fibula. No periprosthetic lucency   in the proximal tibia. Ankle mortise alignment is preserved. Scattered   dystrophic calcifications in the leg. Impression   1. No acute osseous abnormality in the left femur or tibia/fibula. 2.  Left knee arthroplasty in place without evidence for hardware loosening. 3.  Leg length measurements provided above. Please note limitations and   landmarks used. CT extremity lower left with contrast    Result Date: 10/18/2021  History: Acute left lower leg and ankle redness Exam/Technique:  Thin axial images through the left lower extremity were obtained from the superior aspect of the acetabulum to the left foot following the intravenous demonstration of 100 mL Omnipaque 300. Study was supplemented by sagittal and coronal reconstructed images. Comparison: None Findings: There is a left knee arthroplasty in place. Full evaluation of the area of the knee is compromised by extensive metallic artifact. There is a joint effusion of the left knee with fluid seen in the supra patellar bursa. There is no evidence for an acute osseous abnormality. No lytic or blastic processes are seen. No evidence of osteolysis demonstrated. No soft tissue masses or fluid collections are identified. IMPRESSION: 1. Left knee joint effusion with a left knee arthroplasty in place. 2. Otherwise normal CT of the left lower extremity. Workstation:[id number] Finalized by Gregory Ferrer MD on 10/18/2021     Medical Decision [id number]:   Microbiology Results   Procedure Component Value Units Date/Time   Body fluid culture includes gram stain [[id number]] (Abnormal) Collected: 10/18/21 1210   Specimen: Fluid Updated: 10/19/21 1252   Gram Stain Result WHITE BLOOD CELLS PRESENT   FEW GRAM POSITIVE COCCI   QUANTITY NOT SUFFICIENT TO CONCENTRATE INTERPRET RESULTS WITH CAUTION   A Negative report does not exclude the possibility of infection because results are dependent on adequate specimen collection. Culture RARE STREPTOCOCCUS AGALACTIAE (GROUP B)   SARS COV 2 (COVID-19) [facility] ID Onsite Test [[id number]] Collected: 10/18/21 0253   Specimen: Nasopharynx Updated: 10/18/21 0335   Specimen Naso Pharynx   Sent to Testing to be performed at [facility]. COVID-19 [facility] Report to Follow. SARS CoV 2 [[id number]] Collected: 10/18/21 0253   Specimen: Nasopharynx Updated: 10/18/21 0336   First Test? UNKNOWN   Employed in Healthcare? UNKNOWN   Symptoms Defined by CDC? NO   Symptom Onset? U^UNKNOWN   Hospitalized for Covid? UNKNOWN   ICU for Covid? UNKNOWN   Congregate Setting? UNKNOWN   Pregnant?  NO Specimen Type Naso Pharynx   SARS COV 2 Presumptive Negative         Thank you for allowing us to participate in the care of this patient. Please call with questions.     Estrella José, APRN - CNP  [address] [facility]      Pager: [phone] - Office: [phone]

## 2021-11-18 NOTE — PROGRESS NOTES
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 Stop date 11-22-21)  · Long term oral antibiotic suppression following the above with Keflex 500 mg po qid to start on 11/23/21  · Orthopedic surgery following-  S/p  explantation and antibiotic spacer device placement 10-26-21. · Office f/up: Patient will see Orthopedics in 1 week. When she gets a a subsequent follow up visit with Orthopedics please call [phone] for appointment the same day as follow up visit with Orthopedics. Medical Decision Making/Summary/Discussion:11/18/2021     ·   Infection Control Recommendations   · Universal Precautions  ·     Antimicrobial Stewardship Recommendations     · Simplification of therapy    Coordination of Outpatient Care:   · Estimated Length of IV antimicrobials:4 weeks  · Patient will need Midline Catheter Insertion: Yes  · Patient will need PICC line Insertion:No  · Patient will need: Home IV , [place],  SNF,  LTAC: TBD  · Patient will need outpatient wound care:Yes    Chief complaint/reason for consultation:   · Lt knee pyogenic arthritis      History of Present Illness:   Grant Elliott is a 80y.o.-year-old female who was initially admitted on (Not on file). Patient seen at the request of Dr. Dannette Skiff:    Patient with a Hx of prior Lt TKA  In 2007 at [facility] under Dr Magnus Jensen, She subsequently experienced  Lt knee infections which required additional surgeries x 2. She has underlying DM 2, Essential HTN, CKD,PITA and COPD.      Presented to [facility] because of acute onset of pain with ambulation. The knee joint was aspirated and grew Strep. Agalactiae. She was started on antibiotics (ceftriaxone) and was awaiting evaluation at tertiary care center because of her Hx of prior surgeries and infections. [facility] refused transfer. She presented to [facility] because of ongoing severe pain, erythema and inflammation. Ortho has evaluated and plan intervention on 10-25-21. Had pre-op evaluation by Cardiology including stress test.  Patient had positive stress test.   S/p left heart cath 10/26: normal coronary arteries, preserved LV systolic function    Orthopedic Surgery performed  Left knee I&D with fusion nail on 10/26/21 static antibiotic explant/spacer placement. Patient has been doing well post-op. Will be going to SNF on 11-3-21 to continue antibiotic treatment and rehab. Denies any fever, chills, nausea, vomiting  She will need IV ceftriaxone x 4 weeks and then long term oral antibiotic suppression. Will coordinate follow up visit with time she comes to see Ortho for follow up. CURRENT EVALUATION : 11/18/21:    Patient seen in the office on 11-18-21. Upon evaluation the patient was complaining of some tenderness to the left lateral knee. The surgical site appears clean without signs of infection. There is some redness to the lower region of the surgical site that appears to be contact dermatitis from the adhesive dressing. If possible, the dressing should be changed to an ABD with Kerlix wrapped around the knee and ACE wrap to keep the dressing in place. The patient is following up with Dr. Wally Soto in about a week. When she gets a subsequent follow up visit with Dr Wally Soto we can arrange a follow up visit with us the same day. Her IV Ceftriaxone will be completed on 11/22/21 and she will need to start on Keflex on 11/23/21 for chronic suppression therapy.        11/18/21            Left knee 11/17/21    Left knee 11/17/21      Labs, X rays reviewed: 11/18/2021    CRP: 4.3  WBC: 3.9  Hgb: 8.3  BUN: 35  Creat: 1.23    Cultures:  Urine:  ·   Blood:  · 10/21/21: no growth  · 11/10/21: Not completed  Sputum :  ·   Wound:  · Knee aspirate: Strep agalactiae      COVID rapid 10/22/21: negative    Discussed with patient, RN, IM. I have personally reviewed the past medical history, past surgical history, medications, social history, and family history, and I have updated the database accordingly. Past Medical History:     Past Medical History:   Diagnosis Date    Arthritis     Diabetes mellitus ([facility] [address]     Hyperlipidemia     Hypertension        Past Surgical  History:     Past Surgical History:   Procedure Laterality Date    HYSTERECTOMY      KNEE SURGERY Left 10/26/2021    KNEE EXPLANT, IRRIGATION & DEBRIDEMENT, STATIC ANTIBIOTIC SPACER, LINK ORTHO FUSION NAIL SET    REVISION TOTAL KNEE ARTHROPLASTY Left 10/26/2021    KNEE EXPLANT, IRRIGATION & DEBRIDEMENT, STATIC ANTIBIOTIC SPACER, LINK ORTHO FUSION NAIL SET- performed by Veronica Valdez DO at [facility] OR       Medications:         Social History:     Social History     Socioeconomic History    Marital status:       Spouse name: Not on file    Number of children: Not on file    Years of education: Not on file    Highest education level: Not on file   Occupational History    Not on file   Tobacco Use    Smoking status: Never Smoker    Smokeless tobacco: Never Used   Substance and Sexual Activity    Alcohol use: No    Drug use: No    Sexual activity: Not on file   Other Topics Concern    Not on file   Social History Narrative    Not on file     Social Determinants of Health     Financial Resource Strain:     Difficulty of Paying Living Expenses: Not on file   Food Insecurity:     Worried About Running Out of Food in the Last Year: Not on file    Katie of Food in the Last Year: Not on file   Transportation Needs:     Lack of Transportation (Medical): Not on file    Lack of Transportation (Non-Medical): Not on file   Physical Activity:     Days of Exercise per Week: Not on file    Minutes of Exercise per Session: Not on file   Stress:     Feeling of Stress : Not on file   Social Connections:     Frequency of Communication with Friends and Family: Not on file    Frequency of Social Gatherings with Friends and Family: Not on file    Attends Yazdanism Services: Not on file    Active Member of [address] [place] [address] or Organizations: Not on file    Attends Club or Organization Meetings: Not on file    Marital Status: Not on file   Intimate Partner Violence:     Fear of Current or Ex-Partner: Not on file    Emotionally Abused: Not on file    Physically Abused: Not on file    Sexually Abused: Not on file   Housing Stability:     Unable to Pay for Housing in the Last Year: Not on file    Number of [place] in the Last Year: Not on file    Unstable Housing in the Last Year: Not on file       Family History:   No family history on file. Allergies:   Bactrim [sulfamethoxazole-trimethoprim], Codeine, and Lisinopril     Review of Systems:   Constitutional: No fevers or chills. No systemic complaints  Head: No headaches  Eyes: No double vision or blurry vision. No conjunctival inflammation. ENT: No sore throat or runny nose. . No hearing loss, tinnitus or vertigo. Cardiovascular: No chest pain or palpitations. No shortness of breath. No FRANCES  Lung: No shortness of breath or cough. No sputum production  Abdomen: No nausea, vomiting, diarrhea, or abdominal pain. Parul Rast No cramps. Genitourinary: No increased urinary frequency, or dysuria. No hematuria. No suprapubic or CVA pain  Musculoskeletal: No muscle aches or pains. No joint effusions, swelling or deformities. Lt knee effusion  Hematologic: No bleeding or bruising. Neurologic: No headache, weakness, numbness, or tingling. Integument: No rash, no ulcers. Lt knee erythema  Psychiatric: No depression.    Endocrine: No polyuria, no polydipsia, no polyphagia. Physical Examination :     Patient Vitals for the past 8 hrs:   BP Pulse Resp SpO2 Height   11/03/21 1528 -- -- -- -- 5' 4\" (1.626 m)   11/03/21 0800 (!) 144/54 77 18 93 % --     General Appearance: Awake, alert, and in no apparent distress  Head:  Normocephalic, no trauma  Eyes: Pupils equal, round, reactive to light and accommodation; extraocular movements intact; sclera anicteric; conjunctivae pink. No embolic phenomena. ENT: Oropharynx clear, without erythema, exudate, or thrush. No tenderness of sinuses. Mouth/throat: mucosa pink and moist. No lesions. Dentition in good repair. Neck:Supple, without lymphadenopathy. Thyroid normal, No bruits. Pulmonary/Chest: Clear to auscultation, without wheezes, rales, or rhonchi. No dullness to percussion. Cardiovascular: Regular rate and rhythm without murmurs, rubs, or gallops. Abdomen: Soft, non tender. Bowel sounds normal. No organomegaly  All four Extremities: No cyanosis, clubbing, edema. Lt knee effusion and erythema. Neurologic: No gross sensory or motor deficits. Skin: Warm and dry with good turgor. No signs of peripheral arterial or venous insufficiency. No ulcerations. No open wounds. Medical Decision Making -Laboratory:   I have independently reviewed/ordered the following labs:    CBC with Differential:   No results for input(s): WBC, HGB, HCT, PLT, SEGSPCT, BANDSPCT, LYMPHOPCT, MONOPCT, EOSPCT in the last 72 hours. BMP:   No results for input(s): NA, K, CL, CO2, BUN, CREATININE, MG in the last 72 hours. Invalid input(s): CA  Hepatic Function Panel:   No results for input(s): PROT, LABALBU, BILIDIR, IBILI, BILITOT, ALKPHOS, ALT, AST in the last 72 hours. No results for input(s): RPR in the last 72 hours. No results for input(s): HIV in the last 72 hours. No results for input(s): BC in the last 72 hours.   Lab Results   Component Value Date    MUCUS NOT REPORTED 10/22/2021    RBC 2.70 11/03/2021 TRICHOMONAS NOT REPORTED 10/22/2021    WBC 5.5 11/03/2021    YEAST NOT REPORTED 10/22/2021    TURBIDITY Clear 10/22/2021     Lab Results   Component Value Date    CREATININE 1.19 11/03/2021    GLUCOSE 102 11/03/2021       Medical Decision Making-Imaging:     EXAMINATION:   2 X-RAY VIEWS OF THE LEFT TIBIA AND FIBULA; 2 X-RAY VIEWS OF THE LEFT FEMUR. RULER WAS USED.       10/22/2021 9:46 am       COMPARISON:   Left knee radiographs dated 10/21/2021       HISTORY:   ORDERING SYSTEM PROVIDED HISTORY: leg length eval   TECHNOLOGIST PROVIDED HISTORY:   leg length eval   Reason for Exam: leg length eval; Best possible images at this time due to pt   condition . -JEK/GW       FINDINGS:   Left femur: Left femoral head is not visualized due to overlying soft tissue   attenuation. The superior tip of the greater trochanter is used as a   landmark. Femoral length from the superior tip of the greater trochanter to   the medial femoral condyle is approximately 39.2 cm. Left total knee arthroplasty is in place. No acute fracture of the left   femur. No evidence of hardware loosening. Left leg: Distance from the medial femoral condyle to the mid tibial plafond   is 36 cm, which includes the polyethylene liner in the knee arthroplasty. (Total leg length of approximately 75.2 cm from superior tip of greater   trochanter to mid tibial plafond). The liner measures up to 2 cm in   thickness. No acute fracture involving the tibia or fibula. No periprosthetic lucency   in the proximal tibia. Ankle mortise alignment is preserved. Scattered   dystrophic calcifications in the leg. Impression   1. No acute osseous abnormality in the left femur or tibia/fibula. 2.  Left knee arthroplasty in place without evidence for hardware loosening. 3.  Leg length measurements provided above. Please note limitations and   landmarks used.          CT extremity lower left with contrast    Result Date: 10/18/2021  History: Acute left lower leg and ankle redness Exam/Technique: Thin axial images through the left lower extremity were obtained from the superior aspect of the acetabulum to the left foot following the intravenous demonstration of 100 mL Omnipaque 300. Study was supplemented by sagittal and coronal reconstructed images. Comparison: None Findings: There is a left knee arthroplasty in place. Full evaluation of the area of the knee is compromised by extensive metallic artifact. There is a joint effusion of the left knee with fluid seen in the supra patellar bursa. There is no evidence for an acute osseous abnormality. No lytic or blastic processes are seen. No evidence of osteolysis demonstrated. No soft tissue masses or fluid collections are identified. IMPRESSION: 1. Left knee joint effusion with a left knee arthroplasty in place. 2. Otherwise normal CT of the left lower extremity. Workstation:[id number] Finalized by Antonietta Fernando MD on 10/18/2021     Medical Decision [id number]-[id number]-[id number]:   Microbiology Results   Procedure Component Value Units Date/Time   Body fluid culture includes gram stain [[id number]] (Abnormal) Collected: 10/18/21 1210   Specimen: Fluid Updated: 10/19/21 1252   Gram Stain Result WHITE BLOOD CELLS PRESENT   FEW GRAM POSITIVE COCCI   QUANTITY NOT SUFFICIENT TO CONCENTRATE INTERPRET RESULTS WITH CAUTION   A Negative report does not exclude the possibility of infection because results are dependent on adequate specimen collection. Culture RARE STREPTOCOCCUS AGALACTIAE (GROUP B)   SARS COV 2 (COVID-19) [facility] ID Onsite Test [[id number]] Collected: 10/18/21 0253   Specimen: Nasopharynx Updated: 10/18/21 0335   Specimen Naso Pharynx   Sent to Testing to be performed at [address] [place] [address]. COVID-19 [facility] Report to Follow. SARS CoV 2 [[id number]] Collected: 10/18/21 0253   Specimen: Nasopharynx Updated: 10/18/21 0336   First Test? UNKNOWN   Employed in Healthcare?  UNKNOWN   Symptoms Defined by CDC? NO   Symptom Onset? U^UNKNOWN   Hospitalized for Covid? UNKNOWN   ICU for Covid? UNKNOWN   Congregate Setting? UNKNOWN   Pregnant? NO   Specimen Type Naso Pharynx   SARS COV 2 Presumptive Negative         Thank you for allowing us to participate in the care of this patient. Please call with questions. Tony Freitas, APRN - CNP     ATTESTATION:    I have discussed the case, including pertinent history and exam findings with the APRN. I have evaluated the  History, physical findings and pictures of the patient and the key elements of the encounter have been performed by me. I have reviewed the laboratory data, other diagnostic studies and discussed them with the APRN. I have updated the medical record where necessary. I agree with the assessment, plan and orders as documented by the APRN.     Savage Love MD.      [place], [place]      Pager: [phone] - Office: [phone]

## 2021-11-19 DIAGNOSIS — T84.54XA INFECTION ASSOCIATED WITH INTERNAL LEFT KNEE PROSTHESIS, INITIAL ENCOUNTER (HCC): ICD-10-CM

## 2021-11-29 ENCOUNTER — TELEPHONE (OUTPATIENT)
Dept: INFECTIOUS DISEASES | Age: 71
End: 2021-11-29

## 2021-11-29 NOTE — TELEPHONE ENCOUNTER
----- Message from [employer] sent at 11/29/2021  9:33 AM EST -----  Gonzalez Gambino, labs ordered by JOSE LUIS Berman on 11/19/21 fell into the overdue results folder. Please contact patient to see if this test was completed outside of a [facility] facility. If so please obtain results and if testing has not been completed please advise patient to complete. If testing needs to be rescheduled please do so.  Thank you.    ----- Message -----  From: SYSTEM  Sent: 11/27/2021   5:33 AM EST  To: [phone]  Clinical Staff

## 2021-12-02 ENCOUNTER — TELEPHONE (OUTPATIENT)
Dept: INFECTIOUS DISEASES | Age: 71
End: 2021-12-02

## 2021-12-02 NOTE — TELEPHONE ENCOUNTER
Received call from [facility] approval from Dr Oneyda Corrigan to pull mid line. Informed Keny Calabrese.

## 2021-12-07 ENCOUNTER — NURSE ONLY (OUTPATIENT)
Dept: ORTHOPEDIC SURGERY | Age: 71
End: 2021-12-07

## 2021-12-07 DIAGNOSIS — M00.062 STAPHYLOCOCCAL ARTHRITIS OF LEFT KNEE (HCC): Primary | ICD-10-CM

## 2021-12-07 NOTE — PROGRESS NOTES
Aneta Lemon  is a 80y.o. year old female who presents to our office today for postoperative follow up after having undergone a left knee explant with irrigation and debridement, with placement of static antibiotic spacer on 10/26/2021 with Dr. Laura Weston DO. Patient originally had an appointment with Neno Temple PA-C at [facility] but her transportation brought her to the wrong office and left her in the waiting room. Patient was evaluated by Sean Cordero DO, Resident, and he feels that sutures are appropriate to remove today. Patient in the office for suture removal only and will follow up with Jocy DUENAS on 12/14/21 at the [facility] location to be evaluated for her pain that she is having. Sutures removed without complication. Patient's ride called for a transport back to facility and appointment reminder placed into a folder for the patient to give to the nurses at the facility.

## 2021-12-14 ENCOUNTER — OFFICE VISIT (OUTPATIENT)
Dept: ORTHOPEDIC SURGERY | Age: 71
End: 2021-12-14

## 2021-12-14 VITALS — RESPIRATION RATE: 12 BRPM | WEIGHT: 262 LBS | HEIGHT: 64 IN | BODY MASS INDEX: 44.73 KG/M2

## 2021-12-14 DIAGNOSIS — M00.062 STAPHYLOCOCCAL ARTHRITIS OF LEFT KNEE (HCC): Primary | ICD-10-CM

## 2021-12-14 PROCEDURE — 99024 POSTOP FOLLOW-UP VISIT: CPT | Performed by: PHYSICIAN ASSISTANT

## 2021-12-14 ASSESSMENT — ENCOUNTER SYMPTOMS
COLOR CHANGE: 0
COUGH: 0
SHORTNESS OF BREATH: 0
VOMITING: 0

## 2021-12-14 NOTE — PROGRESS NOTES
[place] [address] [facility]  [address] [place]  [address] [address]  Dept: [phone]  Dept Fax: [phone]        Postoperative follow-up note    Subjective:   Julio C Lackey is a 80y.o. year old female who presents to our office today for postoperative followup regarding her   1. Staphylococcal arthritis of left knee ([address]    . Chief Complaint   Patient presents with    Knee Pain     left        Date of Surgery: 10/26/2021 left knee explant and I&D    Julio C Lackey  is a 80y.o. year old female who presents to our office today for postoperative follow up after having undergone a left knee explant with irrigation and debridement with placement of static antibiotic spacer on 10/26/2021 with Dr. Veena Salgado DO. The patient denies fevers, chills, nausea, vomiting, diarrhea. The patient has started physical therapy. She is currently staying at [facility] in [place]. Patient had a previously scheduled appointment on 12/7/2021 but has been having significant difficulties with transportation and was taken to the wrong office. Patient's left knee sutures were removed by orthopedic resident Lizandro Aponte DO (PGY 5). Patient notes that since the sutures have been removed she has noticed significant improvement in her left knee discomfort. She notes that she has been able to complete her daily physical therapy exercises and is able to walk approximately 300 feet at the facility before she has to take a brake. Patient notes that she has not had to take oral pain medication. She is not taking over-the-counter pain medication either at this time. She notes that she has been taking Keflex 500 mg 4 times daily as prescribed by infectious disease. The patient notes that she will be on chronic antibiotic suppression.     Overall the patient notes that she is doing much better and would like to be discharged from the facility soon and return home. She notes that she will have her sister coming in to stay with her until she is ready to take care of herself independently. Patient does not have a scheduled follow-up with infectious disease and was last evaluated on 11/18/2021 by DINESH Llanos CNP. At that appointment she was she was instructed to follow-up in 2 to 3 weeks. Due to the recent increase in COVID-19 cases in our general area the infectious disease department is not actively seeing outpatient consults at the [facility]. Rey's office. Review of Systems   Constitutional: Negative for activity change and fever. HENT: Negative for sneezing. Respiratory: Negative for cough and shortness of breath. Cardiovascular: Negative for chest pain. Gastrointestinal: Negative for vomiting. Musculoskeletal: Negative for arthralgias, joint swelling and myalgias. Skin: Negative for color change. Neurological: Negative for weakness and numbness. Psychiatric/Behavioral: Negative for sleep disturbance. Objective :   General: Junie Bui is a 80 y.o. female who is alert and oriented and sitting comfortably in our office. Ortho Exam  MS:   Patient arrived sitting in the wheelchair. Patient also she is able to stand with a walker. Surgical incision on the anterior aspect of the left knee is healing well without signs of infection noted. There are 2-3 areas of mild eschar noted along the surgical incision. There is no erythema, ecchymosis, new skin lesions or signs of infection noted. Chronic lymphedema changes noted in the distal aspect of the left lower extremity. No tenderness noted with palpation in the left lower extremity. Patient is able to complete an active straight leg raise while seated. She is able to fully plantarflex and dorsiflex the left ankle. The left calf is supple and nontender with palpation.   Sensation is intact to light touch to the left lower extremity without focal deficits present. The skin is noted to be pink warm with brisk capillary refill distally. Neuro: alert. oriented  Eyes: Extra-ocular muscles intact  Mouth: Oral mucosa moist. No perioral lesions  Pulm: Respirations unlabored and regular. Skin: warm, well perfused  Psych:   Patient has good fund of knowledge and displays understanging of exam, diagnosis, and plan. Radiology:    XR KNEE LEFT - 11/17/2021     Narrative   History: Status post removal infected total knee arthroplasty and    placement of static knee fusion nail spacer       Findings: AP lateral and oblique x-rays of the left knee done in the    office today shows static fusion nail spacer in good position with    fracture of proximal tibia as visualized previously unchanged from October 26, 2021.  No signs of loosening of the spacer is appreciated.  Evaluation    of the hardware is incomplete on these images as the images do not extend    proximal enough to visualize the hardware in the AP and lateral    projections.  No further evidence of fracture, subluxation, dislocation,    radiopaque foreign body, radiopaque tumors noted.       Impression: Knee fusion nail static spacer in good position as described    above. Assessment:      1. Staphylococcal arthritis of left knee (HCC)         Plan:       The patient is currently 7 weeks postoperative after undergoing a left total knee explant with irrigation and debridement and static antibiotic spacer placement completed on 10/26/2021 by Dr. Lizabeth Mane DO due to staphylococcal arthritis within the left knee. Overall the patient is doing much better.  Instructions were given back to the facility as listed below:    1.)  From an orthopedic standpoint the patient is able to be discharged from the skilled nursing facility to home as long as she has a safe home environment with a shower chair, walker, grab bars and any other accommodations that would help her to be

## 2021-12-14 NOTE — LETTER
Jocy Castle, ATC  [address] [facility] AND SPORTS MEDICINE  [address] [address] [place] [address]  Dept: [phone]  Dept Fax: [phone]  12/14/21  Patient: Page Camargo  YOB: 1950      Dear Reanna Headley,    I had the pleasure of seeing one of your patients, Zoila Telles today in the office. Below are the relevant portions of my assessment and plan of care. IMPRESSION:  1. Staphylococcal arthritis of left knee (HCC)      PLAN:  1.)  From an orthopedic standpoint the patient is able to be discharged from the skilled nursing facility to home as long as she has a safe home environment with a shower chair, walker, grab bars and any other accommodations that would help her to be ambulatory. 2.)  Continue Keflex 500 mg 4 times daily as prescribed by infectious disease for chronic infection suppression. 3.)  Continue facility physical therapy and transition to home physical therapy when she is discharged. 4.)  Continue over-the-counter Tylenol and/or ibuprofen for any left lower extremity discomfort. Patient notes that she has not had to take narcotic pain medication for the last few days. 5.)  Continue to monitor left knee for any increased redness, swelling, incisional drainage or signs of infection. Patient may shower but should keep the left lower extremity clean and dry otherwise. No submerging the left lower extremity in any bodies of water including bath, hot tub or pool. Call with any concerns for infection on the left knee. 6.) Follow Up in 3 weeks with Dr. So  on January 5th, 2022 at 10:10am at our [facility] Location. Thank you for allowing me to participate in the care of this patient. I will keep you updated on this patient's follow up and I look forward to serving you and your patients again in the future.       Jocy Garcia PA-C

## 2021-12-15 ENCOUNTER — TELEPHONE (OUTPATIENT)
Dept: INFECTIOUS DISEASES | Age: 71
End: 2021-12-15

## 2021-12-15 NOTE — TELEPHONE ENCOUNTER
Jocy, can you please route a message to [facility] with what you need? She is overwhelmed at the Hospital and doesn't have time to call. She read your office note, she just wants to know what you need.  Thank you

## 2021-12-15 NOTE — TELEPHONE ENCOUNTER
Per Marely Conde, patient can follow up in a few months as televisit as long as she is still doing fine.

## 2021-12-15 NOTE — TELEPHONE ENCOUNTER
Jocy Garcia PA-C  You 6 minutes ago (9:40 AM)     AP    I sent her an inbox message.  Thank you    Message text

## 2022-01-04 ENCOUNTER — OFFICE VISIT (OUTPATIENT)
Dept: INFECTIOUS DISEASES | Age: 72
End: 2022-01-04
Payer: COMMERCIAL

## 2022-01-04 VITALS
DIASTOLIC BLOOD PRESSURE: 65 MMHG | SYSTOLIC BLOOD PRESSURE: 127 MMHG | HEIGHT: 60 IN | BODY MASS INDEX: 51.44 KG/M2 | TEMPERATURE: 97.4 F | HEART RATE: 72 BPM | WEIGHT: 262 LBS

## 2022-01-04 DIAGNOSIS — M00.062 STAPHYLOCOCCAL ARTHRITIS OF LEFT KNEE (HCC): Primary | ICD-10-CM

## 2022-01-04 DIAGNOSIS — T84.54XD INFECTION ASSOCIATED WITH INTERNAL LEFT KNEE PROSTHESIS, SUBSEQUENT ENCOUNTER: ICD-10-CM

## 2022-01-04 DIAGNOSIS — E11.9 TYPE 2 DIABETES MELLITUS WITHOUT COMPLICATION, WITHOUT LONG-TERM CURRENT USE OF INSULIN (HCC): ICD-10-CM

## 2022-01-04 PROCEDURE — 2022F DILAT RTA XM EVC RTNOPTHY: CPT | Performed by: INTERNAL MEDICINE

## 2022-01-04 PROCEDURE — G8400 PT W/DXA NO RESULTS DOC: HCPCS | Performed by: INTERNAL MEDICINE

## 2022-01-04 PROCEDURE — 1123F ACP DISCUSS/DSCN MKR DOCD: CPT | Performed by: INTERNAL MEDICINE

## 2022-01-04 PROCEDURE — 1036F TOBACCO NON-USER: CPT | Performed by: INTERNAL MEDICINE

## 2022-01-04 PROCEDURE — G8484 FLU IMMUNIZE NO ADMIN: HCPCS | Performed by: INTERNAL MEDICINE

## 2022-01-04 PROCEDURE — 3046F HEMOGLOBIN A1C LEVEL >9.0%: CPT | Performed by: INTERNAL MEDICINE

## 2022-01-04 PROCEDURE — 1090F PRES/ABSN URINE INCON ASSESS: CPT | Performed by: INTERNAL MEDICINE

## 2022-01-04 PROCEDURE — 4040F PNEUMOC VAC/ADMIN/RCVD: CPT | Performed by: INTERNAL MEDICINE

## 2022-01-04 PROCEDURE — 3017F COLORECTAL CA SCREEN DOC REV: CPT | Performed by: INTERNAL MEDICINE

## 2022-01-04 PROCEDURE — G8417 CALC BMI ABV UP PARAM F/U: HCPCS | Performed by: INTERNAL MEDICINE

## 2022-01-04 PROCEDURE — 99214 OFFICE O/P EST MOD 30 MIN: CPT | Performed by: INTERNAL MEDICINE

## 2022-01-04 PROCEDURE — G8427 DOCREV CUR MEDS BY ELIG CLIN: HCPCS | Performed by: INTERNAL MEDICINE

## 2022-01-04 RX ORDER — CEPHALEXIN 500 MG/1
500 CAPSULE ORAL 4 TIMES DAILY
Qty: 120 CAPSULE | Refills: 5 | Status: SHIPPED | OUTPATIENT
Start: 2022-01-04 | End: 2022-04-07 | Stop reason: SDUPTHER

## 2022-01-04 NOTE — PROGRESS NOTES
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 Stop date 11-22-21)  · Long term oral antibiotic suppression following the above with Keflex 500 mg po qid to start on 11/23/21  · S/p  explantation and antibiotic spacer device placement 10-26-21. Medical Decision Making/Summary/Discussion:1/4/2022     ·   Infection Control Recommendations   · [place] Precautions    Antimicrobial Stewardship Recommendations     · Simplification of therapy    Coordination of Outpatient Care:   · Estimated Length of IV antimicrobials:4 weeks. Stop date 11-22-21  · Patient will need Midline Catheter Insertion: Yes  · Patient will need PICC line Insertion:No  · Patient will need outpatient wound care:Yes    Chief complaint/reason for consultation:   · Lt knee pyogenic arthritis      History of Present Illness:   Gilmar Ortega is a 80y.o.-year-old female who was initially admitted on (Not on file). Patient seen at the request of Dr. Nikos Lawson:    Patient with a Hx of prior Lt TKA  In 2007 at [facility] under Dr Hernán Sarah, She subsequently experienced  Lt knee infections which required additional surgeries x 2. She has underlying DM 2, Essential HTN, CKD,PITA and COPD. Presented to [facility] because of acute onset of pain with ambulation. The knee joint was aspirated and grew Strep. Agalactiae. She was started on antibiotics (ceftriaxone) and was awaiting evaluation at tertiary care center because of her Hx of prior surgeries and infections.     [facility] refused transfer. She presented to [facility] because of ongoing severe pain, erythema and inflammation. Ortho has evaluated and plan intervention on 10-25-21. Had pre-op evaluation by Cardiology including stress test.  Patient had positive stress test.   S/p left heart cath 10/26: normal coronary arteries, preserved LV systolic function    Orthopedic Surgery performed  Left knee I&D with fusion nail on 10/26/21 static antibiotic explant/spacer placement. Patient has been doing well post-op. Will be going to SNF on 11-3-21 to continue antibiotic treatment and rehab. Denies any fever, chills, nausea, vomiting  She will need IV ceftriaxone x 4 weeks and then long term oral antibiotic suppression. Will coordinate follow up visit with time she comes to see Ortho for follow up. CURRENT EVALUATION : 1-4-22    Patient evaluated in the office on 1-4-22 accompanied by family. The patient complained of some tenderness on the left lateral area of knee. The surgical site appears clean without signs of infection. The patient is following up with Dr. Shane Howard in about a week. Her IV Ceftriaxone was completed on 11/22/21 and she is taking Keflex since 11/23/21 for chronic suppression therapy. She is tolerating treatment well.      11/18/21          Left knee 11/17/21      Labs, X rays reviewed: 1/4/2022    CRP: 4.3-->122  WBC: 3.9-->5.5  Hgb: 8.3-->7.1    BUN: 35  Creat: 1.23-->1.19    Cultures:  Urine:  ·   Blood:  · 10/21/21: no growth  · 11/10/21: Not completed  · 11-15-21: Coagulase negative Staphylococci x 1  Sputum :  ·   Wound:  · Knee aspirate: Strep agalactiae      COVID rapid 10/22/21: negative    Discussed with patient,family    I have personally reviewed the past medical history, past surgical history, medications, social history, and family history, and I have updated the database accordingly.   Past Medical History:     Past Medical History:   Diagnosis Date    Arthritis     Diabetes mellitus Partner Violence:     Fear of Current or Ex-Partner: Not on file    Emotionally Abused: Not on file    Physically Abused: Not on file    Sexually Abused: Not on file   Housing Stability:     Unable to Pay for Housing in the Last Year: Not on file    Number of Yefri in the Last Year: Not on file    Unstable Housing in the Last Year: Not on file       Family History:   History reviewed. No pertinent family history. Allergies:   Bactrim [sulfamethoxazole-trimethoprim], Codeine, and Lisinopril     Review of Systems:   Constitutional: No fevers or chills. No systemic complaints  Head: No headaches  Eyes: No double vision or blurry vision. No conjunctival inflammation. ENT: No sore throat or runny nose. . No hearing loss, tinnitus or vertigo. Cardiovascular: No chest pain or palpitations. No shortness of breath. No FRANCES  Lung: No shortness of breath or cough. No sputum production  Abdomen: No nausea, vomiting, diarrhea, or abdominal pain. Ankit Pesa No cramps. Genitourinary: No increased urinary frequency, or dysuria. No hematuria. No suprapubic or CVA pain  Musculoskeletal: No muscle aches or pains. No joint effusions, swelling or deformities. Lt knee effusion  Hematologic: No bleeding or bruising. Neurologic: No headache, weakness, numbness, or tingling. Integument: No rash, no ulcers. Lt knee erythema  Psychiatric: No depression. Endocrine: No polyuria, no polydipsia, no polyphagia. Physical Examination :     Patient Vitals for the past 8 hrs:   BP Pulse Resp SpO2 Height   11/03/21 1528 -- -- -- -- 5' 4\" (1.626 m)   11/03/21 0800 (!) 144/54 77 18 93 % --     General Appearance: Awake, alert, and in no apparent distress  Head:  Normocephalic, no trauma  Eyes: Pupils equal, round, reactive to light and accommodation; extraocular movements intact; sclera anicteric; conjunctivae pink. No embolic phenomena. ENT: Oropharynx clear, without erythema, exudate, or thrush. No tenderness of sinuses.  Mouth/throat: mucosa pink and moist. No lesions. Dentition in good repair. Neck:Supple, without lymphadenopathy. Thyroid normal, No bruits. Pulmonary/Chest: Clear to auscultation, without wheezes, rales, or rhonchi. No dullness to percussion. Cardiovascular: Regular rate and rhythm without murmurs, rubs, or gallops. Abdomen: Soft, non tender. Bowel sounds normal. No organomegaly  All four Extremities: No cyanosis, clubbing, edema. Lt knee: no effusion, no erythema. Neurologic: No gross sensory or motor deficits. Skin: Warm and dry with good turgor. No signs of peripheral arterial or venous insufficiency. No ulcerations. No open wounds. Medical Decision Making -Laboratory:   I have independently reviewed/ordered the following labs:    CBC with Differential:   No results for input(s): WBC, HGB, HCT, PLT, SEGSPCT, BANDSPCT, LYMPHOPCT, MONOPCT, EOSPCT in the last 72 hours. BMP:   No results for input(s): NA, K, CL, CO2, BUN, CREATININE, CA, MG in the last 72 hours. Hepatic Function Panel:   No results for input(s): PROT, LABALBU, BILIDIR, IBILI, BILITOT, ALKPHOS, ALT, AST in the last 72 hours. No results for input(s): RPR in the last 72 hours. No results for input(s): HIV in the last 72 hours. No results for input(s): BC in the last 72 hours. Lab Results   Component Value Date    MUCUS NOT REPORTED 10/22/2021    RBC 2.70 11/03/2021    TRICHOMONAS NOT REPORTED 10/22/2021    WBC 5.5 11/03/2021    YEAST NOT REPORTED 10/22/2021    TURBIDITY Clear 10/22/2021     Lab Results   Component Value Date    CREATININE 1.19 11/03/2021    GLUCOSE 102 11/03/2021       Medical Decision Making-Imaging:     EXAMINATION:   2 X-RAY VIEWS OF THE LEFT TIBIA AND FIBULA; 2 X-RAY VIEWS OF THE LEFT FEMUR.    RULER WAS USED.       10/22/2021 9:46 am       COMPARISON:   Left knee radiographs dated 10/21/2021       HISTORY:   ORDERING SYSTEM PROVIDED HISTORY: leg length eval   TECHNOLOGIST PROVIDED HISTORY:   leg length eval   Reason for Exam: leg length eval; Best possible images at this time due to pt   condition . -JEK/GW       FINDINGS:   Left femur: Left femoral head is not visualized due to overlying soft tissue   attenuation. The superior tip of the greater trochanter is used as a   landmark. Femoral length from the superior tip of the greater trochanter to   the medial femoral condyle is approximately 39.2 cm. Left total knee arthroplasty is in place. No acute fracture of the left   femur. No evidence of hardware loosening. Left leg: Distance from the medial femoral condyle to the mid tibial plafond   is 36 cm, which includes the polyethylene liner in the knee arthroplasty. (Total leg length of approximately 75.2 cm from superior tip of greater   trochanter to mid tibial plafond). The liner measures up to 2 cm in   thickness. No acute fracture involving the tibia or fibula. No periprosthetic lucency   in the proximal tibia. Ankle mortise alignment is preserved. Scattered   dystrophic calcifications in the leg. Impression   1. No acute osseous abnormality in the left femur or tibia/fibula. 2.  Left knee arthroplasty in place without evidence for hardware loosening. 3.  Leg length measurements provided above. Please note limitations and   landmarks used. CT extremity lower left with contrast    Result Date: 10/18/2021  History: Acute left lower leg and ankle redness Exam/Technique: Thin axial images through the left lower extremity were obtained from the superior aspect of the acetabulum to the left foot following the intravenous demonstration of 100 mL Omnipaque 300. Study was supplemented by sagittal and coronal reconstructed images. Comparison: None Findings: There is a left knee arthroplasty in place. Full evaluation of the area of the knee is compromised by extensive metallic artifact. There is a joint effusion of the left knee with fluid seen in the supra patellar bursa.  There is no evidence for an acute osseous abnormality. No lytic or blastic processes are seen. No evidence of osteolysis demonstrated. No soft tissue masses or fluid collections are identified. IMPRESSION: 1. Left knee joint effusion with a left knee arthroplasty in place. 2. Otherwise normal CT of the left lower extremity. Workstation:[id number] Finalized by Lorena Medel MD on 10/18/2021     Medical Decision [id number]:   Microbiology Results   Procedure Component Value Units Date/Time   Body fluid culture includes gram stain [[id number]] (Abnormal) Collected: 10/18/21 1210   Specimen: Fluid Updated: 10/19/21 1252   Gram Stain Result WHITE BLOOD CELLS PRESENT   FEW GRAM POSITIVE COCCI   QUANTITY NOT SUFFICIENT TO CONCENTRATE INTERPRET RESULTS WITH CAUTION   A Negative report does not exclude the possibility of infection because results are dependent on adequate specimen collection. Culture RARE STREPTOCOCCUS AGALACTIAE (GROUP B)   SARS COV 2 (COVID-19) [facility] ID Onsite Test [[id number]] Collected: 10/18/21 0253   Specimen: Nasopharynx Updated: 10/18/21 0335   Specimen Naso Pharynx   Sent to Testing to be performed at [address] [place] [address]. COVID-19 [facility] Report to Follow. SARS CoV 2 [[id number]] Collected: 10/18/21 0253   Specimen: Nasopharynx Updated: 10/18/21 0336   First Test? UNKNOWN   Employed in Healthcare? UNKNOWN   Symptoms Defined by CDC? NO   Symptom Onset? U^UNKNOWN   Hospitalized for Covid? UNKNOWN   ICU for Covid? UNKNOWN   Congregate Setting? UNKNOWN   Pregnant? NO   Specimen Type Naso Pharynx   SARS COV 2 Presumptive Negative         Thank you for allowing us to participate in the care of this patient. Please call with questions.     Anjali Lopez MD  [place], [place]      Pager: [phone] - Office: [phone]

## 2022-01-05 ENCOUNTER — TELEPHONE (OUTPATIENT)
Dept: ORTHOPEDIC SURGERY | Age: 72
End: 2022-01-05

## 2022-01-05 NOTE — TELEPHONE ENCOUNTER
Patient is status post left knee explant, I & D and static antibiotic spacer implant; date of surgery 10/26/2021. Please call patient at [phone] to advise if the next appointment will need to be with Dr. Manoj George or if it can be with Jocy Garcia. Thank you.

## 2022-01-05 NOTE — TELEPHONE ENCOUNTER
Patient called and stated her transportation did not arrive therefore she will not be able to make it in for today's appointment with Dr. Shane Howard. Please return her call to [phone] to advise. Thank you.

## 2022-01-13 ENCOUNTER — OFFICE VISIT (OUTPATIENT)
Dept: ORTHOPEDIC SURGERY | Age: 72
End: 2022-01-13

## 2022-01-13 VITALS — RESPIRATION RATE: 12 BRPM | HEIGHT: 60 IN | BODY MASS INDEX: 51.44 KG/M2 | WEIGHT: 262 LBS

## 2022-01-13 DIAGNOSIS — M00.062 STAPHYLOCOCCAL ARTHRITIS OF LEFT KNEE (HCC): Primary | ICD-10-CM

## 2022-01-13 PROCEDURE — 99024 POSTOP FOLLOW-UP VISIT: CPT | Performed by: PHYSICIAN ASSISTANT

## 2022-01-13 NOTE — PROGRESS NOTES
[address] [facility] AND SPORTS MEDICINE  [address] [facility] [address] [facility] [place]  [address] [address]  Dept: [phone]  Dept Fax: [phone]        Postoperative follow-up note    Subjective:   Kya Valencia is a 80y.o. year old female who presents to our office today for postoperative followup regarding her   1. Staphylococcal arthritis of left knee [facility])        Chief Complaint   Patient presents with    Post-Op Check     DOS: 10/26/2021 left knee         Date of Surgery: 10/26/2021     Kya Valencia  is a 80y.o. year old female who presents to our office today for postoperative follow up after having undergone a left knee explant with irrigation and debridement with placement of static antibiotic spacer on 10/26/2021 with Dr. Judith Quarles DO. The patient denies fevers, chills, nausea, vomiting, diarrhea. The patient returned from home from the skilled nursing facility and is undergoing home physical therapy 1 day/week. She notes that she is also doing her home exercises at least 5 days a week. Patient denies pain within the left knee at this time. She notes that she does have mild discomfort if she twists the knee, otherwise she is doing very well. Patient has continued to take oral Keflex as prescribed by infectious disease and will be on that medication for life for chronic suppression. The patient tolerates the medication well. Review of Systems   Constitutional: Negative for activity change and fever. HENT: Negative for sneezing. Respiratory: Negative for cough and shortness of breath. Cardiovascular: Negative for chest pain. Gastrointestinal: Negative for vomiting. Musculoskeletal: Positive for arthralgias (Mild left knee). Negative for joint swelling and myalgias. Skin: Negative for color change. Neurological: Negative for weakness and numbness.    Psychiatric/Behavioral: Negative for sleep disturbance. Objective :   General: Sayda Hernandez is a 80 y.o. female who is alert and oriented and sitting comfortably in our office. Ortho Exam  MS: Evaluation of the left lower extremity reveals a well-healed surgical incision along the anterior aspect of the left knee (picture below). There is no erythema, new skin lesions or signs of infection noted to the left knee. There is chronic venous stasis changes noted in the distal aspect of the left lower extremity, unchanged when compared to previous appointment on 12/14/2021. The patient is able to complete a seated straight leg raise without difficulty. Patient has full range of motion of the left ankle without discomfort noted. Sensation is intact to light touch to the left lower extremity without focal deficits present. The skin is noted to be warm with brisk capillary refill distally on the left foot. Neuro: alert. oriented  Eyes: Extra-ocular muscles intact  Mouth: Oral mucosa moist. No perioral lesions  Pulm: Respirations unlabored and regular. Skin: warm, well perfused  Psych:   Patient has good fund of knowledge and displays understanging of exam, diagnosis, and plan.             Radiology:     XR KNEE LEFT - 11/17/2021     Narrative   History: Status post removal infected total knee arthroplasty and    placement of static knee fusion nail spacer       Findings: AP lateral and oblique x-rays of the left knee done in the    office today shows static fusion nail spacer in good position with    fracture of proximal tibia as visualized previously unchanged from October 26, 2021.  No signs of loosening of the spacer is appreciated.  Evaluation    of the hardware is incomplete on these images as the images do not extend    proximal enough to visualize the hardware in the AP and lateral    projections.  No further evidence of fracture, subluxation, dislocation,    radiopaque foreign body, radiopaque tumors noted.       Impression: Knee fusion nail static spacer in good position as described    above.            Assessment:      1. Staphylococcal arthritis of left knee ([address]         Plan:      Patient is currently 2 and half months postoperative after undergoing a left knee explant with irrigation and debridement with placement of a static antibiotic spacer on 10/26/2021 with Dr. Bonnie Duong DO. Overall the patient is doing very well and has very minimal pain within the left knee. Her incision is well-healed and she is tolerating her oral antibiotic, Keflex, very well. The patient is to continue with home physical therapy to work on lower extremity strengthening, gait training and balance. The patient will follow-up with Dr. Buffy Aponte in 4-6 weeks to discuss possible antibiotic spacer removal and left total knee arthroplasty revision. Patient notes that she does have a follow-up appointment with infectious disease in April 2022. Patient was instructed to call our office with any questions or concerns prior to her next appointment. She voiced her understanding. Follow up: Return in about 6 weeks (around 2/24/2022) for post-operative follow up with Dr. Buffy Aponte. No orders of the defined types were placed in this encounter. No orders of the defined types were placed in this encounter. This note is created with the assistance of a speech recognition program.  While intending to generate a document that actually reflects the content of the visit, the document can still have some errors including those of syntax and sound a like substitutions which may escape proof reading. In such instances, actual meaning can be extrapolated by contextual diversion.      Electronically signed by Geri Floyd PA-C on 1/14/2022 at 3:14 PM

## 2022-01-14 ASSESSMENT — ENCOUNTER SYMPTOMS
SHORTNESS OF BREATH: 0
COLOR CHANGE: 0
VOMITING: 0
COUGH: 0

## 2022-02-07 ENCOUNTER — TELEPHONE (OUTPATIENT)
Dept: ORTHOPEDIC SURGERY | Age: 72
End: 2022-02-07

## 2022-02-07 NOTE — TELEPHONE ENCOUNTER
Tried calling patient about post-op scheduled for 02/07/2022 which was a no show. She answered the phone when I identified myself she hung up.

## 2022-03-07 ENCOUNTER — OFFICE VISIT (OUTPATIENT)
Dept: ORTHOPEDIC SURGERY | Age: 72
End: 2022-03-07
Payer: COMMERCIAL

## 2022-03-07 ENCOUNTER — TELEPHONE (OUTPATIENT)
Dept: ORTHOPEDIC SURGERY | Age: 72
End: 2022-03-07

## 2022-03-07 VITALS — WEIGHT: 262 LBS | BODY MASS INDEX: 51.44 KG/M2 | HEIGHT: 60 IN

## 2022-03-07 DIAGNOSIS — G89.29 CHRONIC PAIN OF LEFT KNEE: Primary | ICD-10-CM

## 2022-03-07 DIAGNOSIS — M25.562 CHRONIC PAIN OF LEFT KNEE: Primary | ICD-10-CM

## 2022-03-07 PROCEDURE — 99214 OFFICE O/P EST MOD 30 MIN: CPT | Performed by: ORTHOPAEDIC SURGERY

## 2022-03-07 ASSESSMENT — ENCOUNTER SYMPTOMS
SHORTNESS OF BREATH: 0
ROS SKIN COMMENTS: NEGATIVE FOR RASH
EYE DISCHARGE: 0
ABDOMINAL PAIN: 0

## 2022-03-07 NOTE — PROGRESS NOTES
[address]  [address]  Dept: [phone]  Dept Fax: [phone]        Ambulatory Follow Up      Subjective:   Refugio Jones is a 80y.o. year old female who presents to our office today for routine followup regarding her   1. Chronic pain of left knee    . Chief Complaint   Patient presents with    Follow-up     a left knee explant with irrigation and debridement with placement of static antibiotic spacer on 10/26/2021       HPI     Loretta Gilliland is a 10year-old female who presents to the office today for recheck of her left knee. She underwent left knee explantation and irrigation and debridement with placement of a static antibiotic spacer on 10/26/2021. She notes feeling much better now as far as not feeling sick like she did previously. However she is happy with her knee not being able to bend it she would like to consider knee revision surgery at some point. She is denies any fevers, chills, nausea, vomiting, diarrhea. Review of Systems   Constitutional: Positive for activity change. Negative for fever. HENT: Negative for dental problem. Eyes: Negative for discharge. Respiratory: Negative for shortness of breath. Cardiovascular: Negative for chest pain. Gastrointestinal: Negative for abdominal pain. Genitourinary: Negative. Musculoskeletal: Positive for arthralgias. Skin:        Negative for rash   Neurological: Positive for weakness. Psychiatric/Behavioral: Negative for confusion. I have reviewed the CC, HPI, ROS, PMH, FHX, Social History, and if not present in this note, I have reviewed in the patient's chart. I agree with the documentation provided by other staff and have reviewed their documentation prior to providing my signature indicating agreement. Objective :   Ht 5' (1.524 m)   Wt 262 lb (118.8 kg)   BMI 51.17 kg/m²  Body mass index is 51.17 kg/m².   General: Gilmar Ortega is a 80 y.o. female who is alert and oriented and sitting comfortably in our office. Ortho Exam  MS: Left knee incisions well-healed. No instability of the left knee is appreciated. Motor, sensory, vascular examination left lower extremity is grossly intact without focal deficits. No signs of infection left knee appreciated. Neuro: alert and oriented to person and place. Eyes: Extra-ocular muscles intact  Mouth: Oral mucosa moist. No perioral lesions  Pulm: Respirations unlabored and regular. Symmetric chest excursion without outward deformity is noted. Skin: warm, well perfused  Psych:   Patient has good fund of knowledge and displays understanging of exam, diagnosis, and plan. Radiology:     XR KNEE LEFT (3 VIEWS)    Result Date: 3/7/2022  History: Static fusion nail spacer left knee secondary to infection Findings: AP, lateral x-rays of the left knee done in the office today shows static spacer with cement spacer intact in good position without complications. No further evidence of fracture, subluxation, dislocation or radiopaque foreign body or radiopaque tumors noted. Impression: Left knee static spacer in good position as described above. Assessment:      1. Chronic pain of left knee       Plan:      The patient is on chronic Keflex antibiotic suppression. Consideration for total knee arthroplasty revision could be made at some point if she is felt to be safe from an infectious disease standpoint. I am going to ask our infectious disease colleagues to help us determine if she is safe enough to undergo revision arthroplasty. I spoke with the patient at length about her significant increased risk of infection if she were to choose a revision arthroplasty as she notes understanding. All details of the procedure, as well as risks, benefits and alternatives, including the option of non operative versus operative treatment were discussed.   The patient understands that risks of diversion      Electronically signed by Simone Serve, DO, Nathanial Fabry on 3/7/2022 at 4:15 PM

## 2022-03-07 NOTE — TELEPHONE ENCOUNTER
Called dr Luo Fees office to schedule appt for patient per Dr Milka Hayward request.  Dr Osvaldo Andino would like to know dr's thoughts on left knee revision replacement surgery. Per , she has appt on 4/7/22. Will note patient's chart and address at pt's appt.

## 2022-04-06 NOTE — PROGRESS NOTES
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 Stop date 11-22-21)  Long term oral antibiotic suppression with Keflex 500 mg po qid to start on 11/23/21. No stop date. Renewal of Keflex ordered 4/7/22  Recommend ace bandages or compression stockings on bilateral lower extremities at night or during the day to help with venous insufficiency and edema. History of Present Illness:   Lata Abreu is a 71y.o.-year-old female who was seen in the office on 4/7/2022. Patient seen at the request of Dr. Shaka Castro:    Patient with a Hx of prior Lt TKA  In 2007 at [facility] under Dr Kelly Rueda, She subsequently experienced  Lt knee infections which required additional surgeries x 2. She has underlying DM 2, Essential HTN, CKD,PITA and COPD. Presented to [facility] because of acute onset of pain with ambulation. The knee joint was aspirated and grew Strep. Agalactiae. She was started on antibiotics (ceftriaxone) and was awaiting evaluation at tertiary care center because of her Hx of prior surgeries and infections. [facility] refused transfer. She presented to [facility] because of ongoing severe pain, erythema and inflammation. Ortho has evaluated and plan intervention on 10-25-21.     Had pre-op evaluation by Cardiology including stress test.  Patient had positive stress test.   S/p left heart cath 10/26: normal coronary arteries, preserved LV systolic function    Orthopedic Surgery performed  Left knee I&D with fusion nail on 10/26/21 S/P antibiotic explant/spacer placement. Patient has been doing well post-op. Will be going to SNF on 11-3-21 to continue antibiotic treatment and rehab. Denies any fever, chills, nausea, vomiting  She will need IV ceftriaxone x 4 weeks and then long term oral antibiotic suppression. Office Visit: 1-4-22    Patient evaluated in the office on 1-4-22 accompanied by family. The patient complained of some tenderness on the left lateral area of knee. The surgical site appears clean without signs of infection. The patient is following up with Dr. Ferdinand Roach in about a week. Her IV Ceftriaxone was completed on 11/22/21 and she is taking Keflex since 11/23/21 for chronic suppression therapy. She is tolerating treatment well. CURRENT EVALUATION :  Office Visit 4/7/22    The patient presented today for a follow-up. She was in a wheelchair as it is difficult for her to get around. She has been taking the Keflex for suppression as ordered and has been doing very well with no new s/s of infection. The site of the knee surgery has healed very well. She is unable to bend her left leg as there is a aryan present. She uses a walker at home. She denied any fevers, chills, nausea, vomiting or diarrhea since initiating Keflex. She is experiencing BLE pain and swelling as well as skin changes that are consistent with venous stasis dermatitis. I had a long discussion with her concerning the need to apply some pressure to the lower extremity to avoid further development of edema. I am concerned that if additional edema develops she may have venous stasis ulcerations develop which could lead then to recurrence of cellulitis. The patient was instructed as to how to apply an Ace bandage to the foot and leg. I advised her to employ these every night so as to decrease the edema by the time she wakes up in the morning.   The patient was successful in applying an Ace bandage in the Last Year: Not on file    Ran Out of Food in the Last Year: Not on file   Transportation Needs:     Lack of Transportation (Medical): Not on file    Lack of Transportation (Non-Medical): Not on file   Physical Activity:     Days of Exercise per Week: Not on file    Minutes of Exercise per Session: Not on file   Stress:     Feeling of Stress : Not on file   Social Connections:     Frequency of Communication with Friends and Family: Not on file    Frequency of Social Gatherings with Friends and Family: Not on file    Attends [employer]ism Services: Not on file    Active Member of [address] [place] [address] or Organizations: Not on file    Attends Club or Organization Meetings: Not on file    Marital Status: Not on file   Intimate Partner Violence:     Fear of Current or Ex-Partner: Not on file    Emotionally Abused: Not on file    Physically Abused: Not on file    Sexually Abused: Not on file   Housing Stability:     Unable to Pay for Housing in the Last Year: Not on file    Number of [place] in the Last Year: Not on file    Unstable Housing in the Last Year: Not on file       Family History:   History reviewed. No pertinent family history. Allergies:   Bactrim [sulfamethoxazole-trimethoprim], Codeine, and Lisinopril     Review of Systems:   Constitutional: No fevers or chills. No systemic complaints  Head: No headaches  Eyes: No double vision or blurry vision. No conjunctival inflammation. ENT: No sore throat or runny nose. . No hearing loss, tinnitus or vertigo. Cardiovascular: No chest pain or palpitations. No shortness of breath. No FRANCES  Lung: No shortness of breath or cough. No sputum production  Abdomen: No nausea, vomiting, diarrhea, or abdominal pain. Anneliese Myers No cramps. Genitourinary: No increased urinary frequency, or dysuria. No hematuria. No suprapubic or CVA pain  Musculoskeletal: No muscle aches or pains. No joint effusions, swelling or deformities.  Lt knee effusion  Hematologic: No bleeding or bruising. Neurologic: No headache, weakness, numbness, or tingling. Integument: No rash, no ulcers. Lt knee erythema  Psychiatric: No depression. Endocrine: No polyuria, no polydipsia, no polyphagia. Physical Examination :     Patient Vitals for the past 8 hrs:   BP Pulse Resp SpO2 Height   11/03/21 1528 -- -- -- -- 5' 4\" (1.626 m)   11/03/21 0800 (!) 144/54 77 18 93 % --     General Appearance: Awake, alert, and in no apparent distress  Head:  Normocephalic, no trauma  Eyes: Pupils equal, round, reactive to light and accommodation; extraocular movements intact; sclera anicteric; conjunctivae pink. No embolic phenomena. ENT: Oropharynx clear, without erythema, exudate, or thrush. No tenderness of sinuses. Mouth/throat: mucosa pink and moist. No lesions. Dentition in good repair. Neck:Supple, without lymphadenopathy. Thyroid normal, No bruits. Pulmonary/Chest: Clear to auscultation, without wheezes, rales, or rhonchi. No dullness to percussion. Cardiovascular: Regular rate and rhythm without murmurs, rubs, or gallops. Abdomen: Soft, non tender. Bowel sounds normal. No organomegaly  All four Extremities: No cyanosis, clubbing. Lt knee: no effusion, no erythema. Left knee incision well-healed. She has developed venous stasis dermatitis in the lower extremities, prominent on the left side  Neurologic: No gross sensory or motor deficits. Skin: Warm and dry with good turgor. Signs of peripheral venous insufficiency. Skin with peau d'orange texture. Medical Decision Making -Laboratory:   I have independently reviewed/ordered the following labs:    CBC with Differential:   No results for input(s): WBC, HGB, HCT, PLT, SEGSPCT, BANDSPCT, LYMPHOPCT, MONOPCT, EOSPCT in the last 72 hours. BMP:   No results for input(s): NA, K, CL, CO2, BUN, CREATININE, CA, MG in the last 72 hours.   Hepatic Function Panel:   No results for input(s): PROT, LABALBU, BILIDIR, IBILI, BILITOT, ALKPHOS, ALT, AST in the last 72 hours. No results for input(s): RPR in the last 72 hours. No results for input(s): HIV in the last 72 hours. No results for input(s): BC in the last 72 hours. Lab Results   Component Value Date    MUCUS NOT REPORTED 10/22/2021    RBC 2.70 11/03/2021    TRICHOMONAS NOT REPORTED 10/22/2021    WBC 5.5 11/03/2021    YEAST NOT REPORTED 10/22/2021    TURBIDITY Clear 10/22/2021     Lab Results   Component Value Date    CREATININE 1.19 11/03/2021    GLUCOSE 102 11/03/2021       Medical Decision Making-Imaging:     EXAMINATION:   2 X-RAY VIEWS OF THE LEFT TIBIA AND FIBULA; 2 X-RAY VIEWS OF THE LEFT FEMUR. RULER WAS USED.       10/22/2021 9:46 am       COMPARISON:   Left knee radiographs dated 10/21/2021       HISTORY:   ORDERING SYSTEM PROVIDED HISTORY: leg length eval   TECHNOLOGIST PROVIDED HISTORY:   leg length eval   Reason for Exam: leg length eval; Best possible images at this time due to pt   condition . -JEK/GW       FINDINGS:   Left femur: Left femoral head is not visualized due to overlying soft tissue   attenuation. The superior tip of the greater trochanter is used as a   landmark. Femoral length from the superior tip of the greater trochanter to   the medial femoral condyle is approximately 39.2 cm. Left total knee arthroplasty is in place. No acute fracture of the left   femur. No evidence of hardware loosening. Left leg: Distance from the medial femoral condyle to the mid tibial plafond   is 36 cm, which includes the polyethylene liner in the knee arthroplasty. (Total leg length of approximately 75.2 cm from superior tip of greater   trochanter to mid tibial plafond). The liner measures up to 2 cm in   thickness. No acute fracture involving the tibia or fibula. No periprosthetic lucency   in the proximal tibia. Ankle mortise alignment is preserved. Scattered   dystrophic calcifications in the leg. Impression   1.   No acute osseous abnormality in the left femur or tibia/fibula. 2.  Left knee arthroplasty in place without evidence for hardware loosening. 3.  Leg length measurements provided above. Please note limitations and   landmarks used. CT extremity lower left with contrast    Result Date: 10/18/2021  History: Acute left lower leg and ankle redness Exam/Technique: Thin axial images through the left lower extremity were obtained from the superior aspect of the acetabulum to the left foot following the intravenous demonstration of 100 mL Omnipaque 300. Study was supplemented by sagittal and coronal reconstructed images. Comparison: None Findings: There is a left knee arthroplasty in place. Full evaluation of the area of the knee is compromised by extensive metallic artifact. There is a joint effusion of the left knee with fluid seen in the supra patellar bursa. There is no evidence for an acute osseous abnormality. No lytic or blastic processes are seen. No evidence of osteolysis demonstrated. No soft tissue masses or fluid collections are identified. IMPRESSION: 1. Left knee joint effusion with a left knee arthroplasty in place. 2. Otherwise normal CT of the left lower extremity. Workstation:[id number] Finalized by Cici Swan MD on 10/18/2021     Medical Decision [facility]:   Microbiology Results   Procedure Component Value Units Date/Time   Body fluid culture includes gram stain [[id number]] (Abnormal) Collected: 10/18/21 1210   Specimen: Fluid Updated: 10/19/21 1252   Gram Stain Result WHITE BLOOD CELLS PRESENT   FEW GRAM POSITIVE COCCI   QUANTITY NOT SUFFICIENT TO CONCENTRATE INTERPRET RESULTS WITH CAUTION   A Negative report does not exclude the possibility of infection because results are dependent on adequate specimen collection.    Culture RARE STREPTOCOCCUS AGALACTIAE (GROUP B)   SARS COV 2 (COVID-19) [facility] ID Onsite Test [[id number]] Collected: 10/18/21 0253   Specimen: Nasopharynx Updated: 10/18/21 0335   Specimen Naso Pharynx Sent to Testing to be performed at [address] [place] [address] COVID-19 [facility] Report to Follow. SARS CoV 2 [[id number]] Collected: 10/18/21 0253   Specimen: Nasopharynx Updated: 10/18/21 0336   First Test? UNKNOWN   Employed in Healthcare? UNKNOWN   Symptoms Defined by CDC? NO   Symptom Onset? U^UNKNOWN   Hospitalized for Covid? UNKNOWN   ICU for Covid? UNKNOWN   Congregate Setting? UNKNOWN   Pregnant? NO   Specimen Type Naso Pharynx   SARS COV 2 Presumptive Negative         Thank you for allowing us to participate in the care of this patient. Please call with questions.     Brigido Gentile MD  [place], [place]      Pager: [phone] - Office: [phone]

## 2022-04-07 ENCOUNTER — OFFICE VISIT (OUTPATIENT)
Dept: INFECTIOUS DISEASES | Age: 72
End: 2022-04-07
Payer: COMMERCIAL

## 2022-04-07 VITALS
SYSTOLIC BLOOD PRESSURE: 139 MMHG | HEIGHT: 61 IN | WEIGHT: 262 LBS | HEART RATE: 75 BPM | TEMPERATURE: 97.4 F | OXYGEN SATURATION: 98 % | DIASTOLIC BLOOD PRESSURE: 74 MMHG | BODY MASS INDEX: 49.47 KG/M2

## 2022-04-07 DIAGNOSIS — T84.54XD INFECTION ASSOCIATED WITH INTERNAL LEFT KNEE PROSTHESIS, SUBSEQUENT ENCOUNTER: Primary | ICD-10-CM

## 2022-04-07 DIAGNOSIS — E11.9 TYPE 2 DIABETES MELLITUS WITHOUT COMPLICATION, WITHOUT LONG-TERM CURRENT USE OF INSULIN (HCC): ICD-10-CM

## 2022-04-07 DIAGNOSIS — I87.2 VENOUS STASIS DERMATITIS OF LEFT LOWER EXTREMITY: ICD-10-CM

## 2022-04-07 PROCEDURE — 99214 OFFICE O/P EST MOD 30 MIN: CPT | Performed by: INTERNAL MEDICINE

## 2022-04-07 RX ORDER — CEPHALEXIN 500 MG/1
500 CAPSULE ORAL 4 TIMES DAILY
Qty: 360 CAPSULE | Refills: 5 | Status: SHIPPED | OUTPATIENT
Start: 2022-04-07 | End: 2023-09-29

## 2022-04-14 ENCOUNTER — TELEPHONE (OUTPATIENT)
Dept: ORTHOPEDIC SURGERY | Age: 72
End: 2022-04-14

## 2022-04-14 NOTE — TELEPHONE ENCOUNTER
Patient called and would like someone to call her due to wanting to know about her left leg she has a straight aryan in the leg and she would like to know if she would need to follow up with Dr Cielo Argueta and make an appt, she is uncertain what to do from here as she wants to be able to start bending the knee, [phone], thank you

## 2022-05-23 ENCOUNTER — OFFICE VISIT (OUTPATIENT)
Dept: ORTHOPEDIC SURGERY | Age: 72
End: 2022-05-23
Payer: COMMERCIAL

## 2022-05-23 VITALS — HEIGHT: 61 IN | WEIGHT: 262 LBS | BODY MASS INDEX: 49.47 KG/M2

## 2022-05-23 DIAGNOSIS — M00.062 STAPHYLOCOCCAL ARTHRITIS OF LEFT KNEE (HCC): ICD-10-CM

## 2022-05-23 PROCEDURE — 99213 OFFICE O/P EST LOW 20 MIN: CPT | Performed by: ORTHOPAEDIC SURGERY

## 2022-05-23 ASSESSMENT — ENCOUNTER SYMPTOMS
SHORTNESS OF BREATH: 0
EYE DISCHARGE: 0
ROS SKIN COMMENTS: NEGATIVE FOR RASH
ABDOMINAL PAIN: 0

## 2022-05-23 NOTE — PROGRESS NOTES
[address]  [address]  Dept: [phone]  Dept Fax: [phone]        Ambulatory Follow Up      Subjective:   Wes Hallman is a 71y.o. year old female who presents to our office today for routine followup regarding her   1. Staphylococcal arthritis of left knee ([address]    . Chief Complaint   Patient presents with    Follow-up     left knee i & d       HPI     Sabina Hatchet is a 73-year-old female who presents the office today for recheck. She underwent left total knee arthroplasty incision, irrigation, debridement and removal of left total knee arthroplasty with placement of a static antibiotic spacer on 10/26/2021. The patient states at the time of her initial surgery she was feeling quite sick. She does not feel sick anymore. She does not like her stiff left knee but otherwise is doing well. She has been able to do some ambulation with her left knee. She states she recently felt some cracking with her left knee and would like to have x-rays today to look at that closer. Review of Systems   Constitutional: Positive for activity change. Negative for fever. HENT: Negative for dental problem. Eyes: Negative for discharge. Respiratory: Negative for shortness of breath. Cardiovascular: Negative for chest pain. Gastrointestinal: Negative for abdominal pain. Genitourinary: Negative. Musculoskeletal: Positive for arthralgias. Skin:        Negative for rash   Neurological: Positive for weakness. Psychiatric/Behavioral: Negative for confusion. I have reviewed the CC, HPI, ROS, PMH, FHX, Social History, and if not present in this note, I have reviewed in the patient's chart. I agree with the documentation provided by other staff and have reviewed their documentation prior to providing my signature indicating agreement.     Objective :   Ht 5' 1\" (1.549 m)   Wt 262 lb (118.8 kg)   BMI 49.50 kg/m²  Body mass index is 49.5 kg/m². General: Nathan Ham is a 71 y.o. female who is alert and oriented and sitting comfortably in our office. Ortho Exam  MS: Left knee incisions well-healed without signs of infection. Mild chronic lymphedema pretibial area bilaterally is noted. Motor, sensory, vascular examination to the left lower extremity is grossly intact without focal deficits. Neuro: alert and oriented to person and place. Eyes: Extra-ocular muscles intact  Mouth: Oral mucosa moist. No perioral lesions  Pulm: Respirations unlabored and regular. Symmetric chest excursion without outward deformity is noted. Skin: warm, well perfused  Psych:   Patient has good fund of knowledge and displays understanging of exam, diagnosis, and plan. Radiology: XR KNEE LEFT (1-2 VIEWS)    Result Date: 5/23/2022  History: Status post static spacer left knee for total knee arthroplasty infection Findings: Nonweightbearing AP and lateral x-rays of the left knee done in the office today shows static fusion nail spacer placement with cement spacer in good position without complications unchanged in alignment from previous x-ray evaluation of March 7, 2022. Mild periosteal reaction along the medial aspect of the distal femur is appreciated. No further evidence of fracture, subluxation, dislocation, radiopaque foreign body, radiopaque tumors noted. Impression: Static knee spacer left knee as described above. Assessment:      1. Staphylococcal arthritis of left knee [facility])       Plan:      Historically the patient underwent left total knee arthroplasty in 2007 with 2 subsequent incision, irrigation, debridement prior to her surgery in October 2021 for knee explant and placement of antibiotic spacer. With her past medical history and her history with the knee she would be at high risk for reinfection if she would decide to try and do a revision knee arthroplasty. In general, I think she has 3 options here. 1 is to live with the antibiotic spacer and chronic antibiotic suppression for which she is on Keflex 500 mg p.o. 4 times daily. The second option would be an attempted revision knee arthroplasty which would potentially place her at high risk for reinfection. The third option would be above-the-knee amputation. The patient is going to consider her options I plan to see her back in 2 months for further evaluation and we will repeat x-rays at that time. Follow up:No follow-ups on file. No orders of the defined types were placed in this encounter.         Orders Placed This Encounter   Procedures    XR KNEE LEFT (1-2 VIEWS)     AP and lateral only     Standing Status:   Future     Number of Occurrences:   1     Standing Expiration Date:   5/23/2023       This note is created with the assistance of a speech recognition program.  While intending to generate a document that actually reflects the content of the visit, the document can still have some errors including those of syntax and sound a like substitutions which may escape proof reading.  In such instances, actual meaning can be extrapolated by contextual diversion      Electronically signed by Kirti Wellington on 5/23/2022 at 10:06 AM

## 2022-09-12 DIAGNOSIS — M25.562 LEFT KNEE PAIN, UNSPECIFIED CHRONICITY: Primary | ICD-10-CM

## 2022-09-13 ENCOUNTER — OFFICE VISIT (OUTPATIENT)
Dept: ORTHOPEDIC SURGERY | Age: 72
End: 2022-09-13
Payer: MEDICARE

## 2022-09-13 VITALS
WEIGHT: 262 LBS | BODY MASS INDEX: 49.47 KG/M2 | RESPIRATION RATE: 14 BRPM | DIASTOLIC BLOOD PRESSURE: 59 MMHG | HEART RATE: 67 BPM | SYSTOLIC BLOOD PRESSURE: 152 MMHG | HEIGHT: 61 IN

## 2022-09-13 DIAGNOSIS — M00.062 STAPHYLOCOCCAL ARTHRITIS OF LEFT KNEE (HCC): Primary | ICD-10-CM

## 2022-09-13 PROCEDURE — 1036F TOBACCO NON-USER: CPT | Performed by: PHYSICIAN ASSISTANT

## 2022-09-13 PROCEDURE — G8427 DOCREV CUR MEDS BY ELIG CLIN: HCPCS | Performed by: PHYSICIAN ASSISTANT

## 2022-09-13 PROCEDURE — G8400 PT W/DXA NO RESULTS DOC: HCPCS | Performed by: PHYSICIAN ASSISTANT

## 2022-09-13 PROCEDURE — 1123F ACP DISCUSS/DSCN MKR DOCD: CPT | Performed by: PHYSICIAN ASSISTANT

## 2022-09-13 PROCEDURE — 1090F PRES/ABSN URINE INCON ASSESS: CPT | Performed by: PHYSICIAN ASSISTANT

## 2022-09-13 PROCEDURE — 99213 OFFICE O/P EST LOW 20 MIN: CPT | Performed by: PHYSICIAN ASSISTANT

## 2022-09-13 PROCEDURE — 3017F COLORECTAL CA SCREEN DOC REV: CPT | Performed by: PHYSICIAN ASSISTANT

## 2022-09-13 PROCEDURE — G8417 CALC BMI ABV UP PARAM F/U: HCPCS | Performed by: PHYSICIAN ASSISTANT

## 2022-09-13 ASSESSMENT — ENCOUNTER SYMPTOMS
VOMITING: 0
CONSTIPATION: 0
APNEA: 0
CHEST TIGHTNESS: 0
RESPIRATORY NEGATIVE: 1
ABDOMINAL DISTENTION: 0
SHORTNESS OF BREATH: 0
NAUSEA: 0
ABDOMINAL PAIN: 0
DIARRHEA: 0
COLOR CHANGE: 0
COUGH: 0

## 2022-09-13 NOTE — PROGRESS NOTES
[address] AND SPORTS MEDICINE  [facility]  [address] [address]  Dept: [phone]  Dept Fax: [phone]        Ambulatory Follow Up      Subjective:   Lee Chandra is a 71y.o. year old female who presents to our office today for routine followup regarding her   1. Staphylococcal arthritis of left knee ([address]    . Chief Complaint   Patient presents with    Knee Pain     L Knee Pain           HPI Lee Chandra  is a 71 y.o.  female who presents today in follow for nee pain for a chronically infected left knee revision. She saw Dr. Shirley Carty on 5/23/2022 and was told she could live with it, have another revision, or have an above-the-knee amputation. She was supposed to follow-up with Dr. Shirley Carty and discuss those options with him. He continues to take her Keflex 500 mg 4 times a day. She states her knee \"pops\" and feels she will fall. She states she ambulates with a walker. She is in the office today with a wheelchair and oxygen. Review of Systems   Constitutional:  Positive for activity change. Negative for appetite change, fatigue and fever. Respiratory: Negative. Negative for apnea, cough, chest tightness and shortness of breath. Cardiovascular: Negative. Negative for chest pain, palpitations and leg swelling. Gastrointestinal:  Negative for abdominal distention, abdominal pain, constipation, diarrhea, nausea and vomiting. Genitourinary:  Negative for difficulty urinating, dysuria and hematuria. Musculoskeletal:  Positive for arthralgias and gait problem. Negative for joint swelling and myalgias. Skin:  Negative for color change and rash. Neurological:  Negative for dizziness, weakness, numbness and headaches. Psychiatric/Behavioral:  Positive for sleep disturbance.         Objective :   BP (!) 152/59   Pulse 67   Resp 14   Ht 5' 1\" (1.549 m)   Wt 262 lb (118.8 kg)   BMI 49.50 kg/m² Body mass index is 49.5 kg/m². General: Brittnaie Rodriguez is a 71 y.o. female who is alert and oriented and sitting comfortably in our office. Orthopedics:    GENERAL: Alert and oriented X3 in no acute distress. SKIN: Intact without lesions or ulcerations. NEURO: Musculoskeletal and axillary nerves intact to sensory and motor testing. VASC: Capillary refill is less than 3 seconds. Knee Exam    LOCATION:  Left Knee  GEN: Alert and oriented X 3, in no acute distress. GAIT: The patient's gait was observed while entering the exam room and was noted to be antalgic. The extremity is in anatomic alignment. SKIN: Intact without rashes, lesions, or ulcerations. No obvious deformity or swelling. NEURO: The patient responds to light touch throughout bilateral LE. Patellar and Achilles reflexes are 2/4. VASC: The bilateral LE is neurovascularly intact with 2/4 DP and 2/4 PT pulses. Brisk capillary refill. ROM: Able to bend knee due to fusion. There is no effusion. MUSC: slightly decreased quad tone  PALP: There is medial and lateral joint line pain. Radiology:     3 views nonweightbearing including AP lateral and oblique of the left knee reveal static fusion nail spacer placement with cement spacer in good position without complications. Unchanged in alignment from previous x-ray on 5/23/2022. Mild periosteal reaction along the medial aspect of the distal femur is appreciated. No further evidence of fracture, subluxation, dislocation, radiopaque foreign body or radiopaque tumors noted. Impression: Static knee spacer left knee as described above    Procedure: no    Assessment:      1. Staphylococcal arthritis of left knee [facility])       Plan:      Patient states that her knee hurts all the time and she does not want to continue living with it like this. I explained to the patient that there is no quick fix for this.   She still has the same 3 options that Dr. Long Root gave her at her last visit which was live with it, attempt a revision of her knee arthroplasty or an above-the-knee amputation. The patient was very tearful in the office and stated she feels she needs to sit down with Dr. Gilford Sievert to discuss her options again. I am in agreement that this is only at decision that her and Dr. Gilford Sievert can make together. The patient will call the office if she has any questions or concerns between now and her appointment with Dr. Gilford Sievert. She should continue taking her antibiotics as prescribed. Follow up:Return With Dr. Gilford Sievert to discuss surgical options. No orders of the defined types were placed in this encounter. No orders of the defined types were placed in this encounter. This note is created with the assistance of a speech recognition program.  While intending to generate a document that actually reflects the content of the visit, the document can still have some errors including those of syntax and sound a like substitutions which may escape proof reading. In such instances, actual meaning can be extrapolated by contextual diversion.      Electronically signed by Iraida Becker PA-C on 9/13/2022 at 5:44 PM

## 2023-04-24 RX ORDER — CEPHALEXIN 500 MG/1
CAPSULE ORAL
Qty: 360 CAPSULE | Refills: 0 | Status: SHIPPED | OUTPATIENT
Start: 2023-04-24

## 2023-05-04 ENCOUNTER — OFFICE VISIT (OUTPATIENT)
Dept: INFECTIOUS DISEASES | Age: 73
End: 2023-05-04
Payer: MEDICARE

## 2023-05-04 VITALS
HEIGHT: 61 IN | HEART RATE: 81 BPM | WEIGHT: 264 LBS | BODY MASS INDEX: 49.84 KG/M2 | SYSTOLIC BLOOD PRESSURE: 98 MMHG | TEMPERATURE: 97.1 F | DIASTOLIC BLOOD PRESSURE: 58 MMHG

## 2023-05-04 DIAGNOSIS — T84.54XD INFECTION ASSOCIATED WITH INTERNAL LEFT KNEE PROSTHESIS, SUBSEQUENT ENCOUNTER: ICD-10-CM

## 2023-05-04 DIAGNOSIS — M00.062 STAPHYLOCOCCAL ARTHRITIS OF LEFT KNEE (HCC): ICD-10-CM

## 2023-05-04 DIAGNOSIS — I87.2 VENOUS STASIS DERMATITIS OF LEFT LOWER EXTREMITY: ICD-10-CM

## 2023-05-04 DIAGNOSIS — E11.9 TYPE 2 DIABETES MELLITUS WITHOUT COMPLICATION, WITHOUT LONG-TERM CURRENT USE OF INSULIN (HCC): ICD-10-CM

## 2023-05-04 DIAGNOSIS — Z99.89 OSA ON CPAP: Primary | ICD-10-CM

## 2023-05-04 DIAGNOSIS — G47.33 OSA ON CPAP: Primary | ICD-10-CM

## 2023-05-04 PROCEDURE — 99213 OFFICE O/P EST LOW 20 MIN: CPT | Performed by: INTERNAL MEDICINE

## 2023-05-04 PROCEDURE — 1123F ACP DISCUSS/DSCN MKR DOCD: CPT | Performed by: INTERNAL MEDICINE

## 2023-05-04 PROCEDURE — 3078F DIAST BP <80 MM HG: CPT | Performed by: INTERNAL MEDICINE

## 2023-05-04 PROCEDURE — 3074F SYST BP LT 130 MM HG: CPT | Performed by: INTERNAL MEDICINE

## 2023-05-04 RX ORDER — CEPHALEXIN 500 MG/1
500 CAPSULE ORAL 4 TIMES DAILY
Qty: 360 CAPSULE | Refills: 4 | Status: SHIPPED | OUTPATIENT
Start: 2023-05-04 | End: 2023-08-02

## 2023-05-04 NOTE — PROGRESS NOTES
approximately 39.2 cm. Left total knee arthroplasty is in place. No acute fracture of the left   femur. No evidence of hardware loosening. Left leg: Distance from the medial femoral condyle to the mid tibial plafond   is 36 cm, which includes the polyethylene liner in the knee arthroplasty. (Total leg length of approximately 75.2 cm from superior tip of greater   trochanter to mid tibial plafond). The liner measures up to 2 cm in   thickness. No acute fracture involving the tibia or fibula. No periprosthetic lucency   in the proximal tibia. Ankle mortise alignment is preserved. Scattered   dystrophic calcifications in the leg. Impression   1. No acute osseous abnormality in the left femur or tibia/fibula. 2.  Left knee arthroplasty in place without evidence for hardware loosening. 3.  Leg length measurements provided above. Please note limitations and   landmarks used. CT extremity lower left with contrast    Result Date: 10/18/2021  History: Acute left lower leg and ankle redness Exam/Technique: Thin axial images through the left lower extremity were obtained from the superior aspect of the acetabulum to the left foot following the intravenous demonstration of 100 mL Omnipaque 300. Study was supplemented by sagittal and coronal reconstructed images. Comparison: None Findings: There is a left knee arthroplasty in place. Full evaluation of the area of the knee is compromised by extensive metallic artifact. There is a joint effusion of the left knee with fluid seen in the supra patellar bursa. There is no evidence for an acute osseous abnormality. No lytic or blastic processes are seen. No evidence of osteolysis demonstrated. No soft tissue masses or fluid collections are identified. IMPRESSION: 1. Left knee joint effusion with a left knee arthroplasty in place. 2. Otherwise normal CT of the left lower extremity.  Workstation:[id number] Finalized by Karl Mohr

## 2024-05-18 ENCOUNTER — HOSPITAL ENCOUNTER (OUTPATIENT)
Dept: HOSPITAL 101 - LAB | Age: 74
Discharge: HOME | End: 2024-05-18
Payer: MEDICARE

## 2024-05-18 DIAGNOSIS — R06.00: Primary | ICD-10-CM

## 2024-05-18 LAB
ADD MANUAL DIFF: NO
ALANINE AMINOTRANSFERASE: 41 U/L (ref 14–59)
ALBUMIN GLOBULIN RATIO: 1
ALBUMIN LEVEL: 3.2 G/DL (ref 3.4–5)
ALKALINE PHOSPHATASE: 177 U/L (ref 46–116)
ANION GAP: 13.3
ASPARTATE AMINO TRANSFERASE: 17 U/L (ref 15–37)
BLOOD UREA NITROGEN: 54 MG/DL (ref 7–18)
CALCIUM: 8.2 MG/DL (ref 8.5–10.1)
CARBON DIOXIDE: 25.6 MMOL/L (ref 21–32)
CHLORIDE: 101 MMOL/L (ref 98–107)
ESTIMATED GFR (AFRICAN AMERICA: 45 (ref 60–?)
ESTIMATED GFR (NON-AFRICAN AME: 37 (ref 60–?)
GLOBULIN: 3.1 G/DL
GLUCOSE: 284 MG/DL (ref 74–106)
HEMATOCRIT: 36.8 % (ref 36–48)
HEMOGLOBIN: 11.4 G/DL (ref 12–16)
IMMATURE GRANULOCYTES ABS AUTO: 0.03 10^3/UL (ref 0–0.03)
IMMATURE GRANULOCYTES PCT AUTO: 0.3 % (ref 0–0.5)
LYMPHOCYTES  ABSOLUTE AUTO: 0.9 10^3/UL (ref 1.2–3.8)
MCV RBC: 92.2 FL (ref 81–99)
MEAN CORPUSCULAR HEMOGLOBIN: 28.6 PG (ref 26.7–34)
MEAN CORPUSCULAR HGB CONC: 31 G/DL (ref 29.9–35.2)
NT PRO B TYPE NATRIURETIC PEPT: 1993 PG/ML (ref ?–900)
PLATELET COUNT: 325 10^3/UL (ref 150–450)
POTASSIUM: 4.9 MMOL/L (ref 3.5–5.1)
RED BLOOD COUNT: 3.99 10^6/UL (ref 4.2–5.4)
SODIUM: 135 MMOL/L (ref 136–145)
TOTAL PROTEIN: 6.3 G/DL (ref 6.4–8.2)
WHITE BLOOD COUNT: 9.3 10^3/UL (ref 4–11)

## 2024-05-18 PROCEDURE — 85025 COMPLETE CBC W/AUTO DIFF WBC: CPT

## 2024-05-18 PROCEDURE — 83880 ASSAY OF NATRIURETIC PEPTIDE: CPT

## 2024-05-18 PROCEDURE — 80053 COMPREHEN METABOLIC PANEL: CPT

## 2024-05-18 PROCEDURE — 36415 COLL VENOUS BLD VENIPUNCTURE: CPT

## 2024-08-16 RX ORDER — CEPHALEXIN 500 MG/1
CAPSULE ORAL
Qty: 360 CAPSULE | Refills: 0 | Status: SHIPPED | OUTPATIENT
Start: 2024-08-16

## 2024-08-16 NOTE — TELEPHONE ENCOUNTER
Last visit: 5/4/23  Next visit: no follow up scheduled. (Pt r/s 1 yr follow up, provider out for r/s appt. And has not r/s)    Refill request for Keflex. Per last dictation, patient continues Keflex for long term suppression. Please see pended script and advise.

## 2024-08-16 NOTE — TELEPHONE ENCOUNTER
Patient notified refill sent but will need follow up before more refills. Patient scheduled for 9/3/24.

## 2024-08-28 ENCOUNTER — HOSPITAL ENCOUNTER (EMERGENCY)
Age: 74
Discharge: HOME | End: 2024-08-28
Payer: MEDICARE

## 2024-08-28 VITALS
DIASTOLIC BLOOD PRESSURE: 78 MMHG | TEMPERATURE: 97.4 F | SYSTOLIC BLOOD PRESSURE: 158 MMHG | OXYGEN SATURATION: 95 % | HEART RATE: 89 BPM

## 2024-08-28 VITALS — SYSTOLIC BLOOD PRESSURE: 148 MMHG | DIASTOLIC BLOOD PRESSURE: 72 MMHG

## 2024-08-28 DIAGNOSIS — Z85.118: ICD-10-CM
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
## 2024-08-28 DIAGNOSIS — Z85.3: ICD-10-CM

## 2024-08-28 DIAGNOSIS — S81.801A: Primary | ICD-10-CM

## 2024-08-28 LAB
ADD MANUAL DIFF: NO
ALANINE AMINOTRANSFERASE: 16 U/L (ref 14–59)
ALBUMIN GLOBULIN RATIO: 0.8
ALBUMIN LEVEL: 3.2 G/DL (ref 3.4–5)
ALKALINE PHOSPHATASE: 151 U/L (ref 46–116)
ANION GAP: 14.4
ASPARTATE AMINO TRANSFERASE: 12 U/L (ref 15–37)
BLOOD UREA NITROGEN: 55 MG/DL (ref 7–18)
CALCIUM: 8.7 MG/DL (ref 8.5–10.1)
CARBON DIOXIDE: 25.5 MMOL/L (ref 21–32)
CHLORIDE: 103 MMOL/L (ref 98–107)
CO2 BLD-SCNC: 25.5 MMOL/L (ref 21–32)
ESTIMATED GFR (AFRICAN AMERICA: 34 (ref 60–?)
ESTIMATED GFR (NON-AFRICAN AME: 28 (ref 60–?)
GLOBULIN: 4.2 G/DL
GLUCOSE BLD-MCNC: 127 MG/DL (ref 74–106)
HCT VFR BLD CALC: 29.8 % (ref 36–48)
HEMATOCRIT: 29.8 % (ref 36–48)
HEMOGLOBIN: 9.5 G/DL (ref 12–16)
IMMATURE GRANULOCYTES ABS AUTO: 0.02 10^3/UL (ref 0–0.03)
IMMATURE GRANULOCYTES PCT AUTO: 0.3 % (ref 0–0.5)
LYMPHOCYTES  ABSOLUTE AUTO: 1.8 10^3/UL (ref 1.2–3.8)
MCV RBC: 90 FL (ref 81–99)
MEAN CORPUSCULAR HEMOGLOBIN: 28.7 PG (ref 26.7–34)
MEAN CORPUSCULAR HGB CONC: 31.9 G/DL (ref 29.9–35.2)
MEAN CORPUSCULAR VOLUME: 90 FL (ref 81–99)
PCO2 BLDV: 38 MMHG (ref 40–52)
PH BLDV: 7.42 [PH] (ref 7.33–7.43)
PLATELET # BLD: 162 10^3/UL (ref 150–450)
PLATELET COUNT: 162 10^3/UL (ref 150–450)
POTASSIUM SERPLBLD-SCNC: 4.9 MMOL/L (ref 3.5–5.1)
POTASSIUM: 4.9 MMOL/L (ref 3.5–5.1)
RED BLOOD COUNT: 3.31 10^6/UL (ref 4.2–5.4)
SODIUM BLD-SCNC: 138 MMOL/L (ref 136–145)
SODIUM: 138 MMOL/L (ref 136–145)
TOTAL PROTEIN: 7.4 G/DL (ref 6.4–8.2)
WBC # BLD: 6 10^3/UL (ref 4–11)
WHITE BLOOD COUNT: 6 10^3/UL (ref 4–11)

## 2024-08-28 PROCEDURE — 73552 X-RAY EXAM OF FEMUR 2/>: CPT

## 2024-08-28 PROCEDURE — 73590 X-RAY EXAM OF LOWER LEG: CPT

## 2024-08-28 PROCEDURE — 85025 COMPLETE CBC W/AUTO DIFF WBC: CPT

## 2024-08-28 PROCEDURE — 85652 RBC SED RATE AUTOMATED: CPT

## 2024-08-28 PROCEDURE — 80053 COMPREHEN METABOLIC PANEL: CPT

## 2024-08-28 PROCEDURE — 99284 EMERGENCY DEPT VISIT MOD MDM: CPT

## 2024-08-28 PROCEDURE — 86140 C-REACTIVE PROTEIN: CPT

## 2024-08-28 PROCEDURE — 36415 COLL VENOUS BLD VENIPUNCTURE: CPT

## 2024-08-28 PROCEDURE — 82800 BLOOD PH: CPT

## 2024-09-03 ENCOUNTER — HOSPITAL ENCOUNTER (OUTPATIENT)
Dept: GENERAL RADIOLOGY | Age: 74
Discharge: HOME | End: 2024-09-05
Payer: MEDICARE

## 2024-09-03 ENCOUNTER — OFFICE VISIT (OUTPATIENT)
Dept: INFECTIOUS DISEASES | Age: 74
End: 2024-09-03
Payer: MEDICARE

## 2024-09-03 ENCOUNTER — HOSPITAL ENCOUNTER (OUTPATIENT)
Age: 74
Discharge: HOME | End: 2024-09-05
Payer: MEDICARE

## 2024-09-03 ENCOUNTER — HOSPITAL ENCOUNTER (OUTPATIENT)
Dept: GENERAL RADIOLOGY | Age: 74
End: 2024-09-03
Payer: MEDICARE

## 2024-09-03 VITALS
SYSTOLIC BLOOD PRESSURE: 150 MMHG | TEMPERATURE: 97.3 F | BODY MASS INDEX: 53.42 KG/M2 | HEART RATE: 66 BPM | DIASTOLIC BLOOD PRESSURE: 70 MMHG | WEIGHT: 265 LBS | HEIGHT: 59 IN

## 2024-09-03 DIAGNOSIS — T84.54XD INFECTION ASSOCIATED WITH INTERNAL LEFT KNEE PROSTHESIS, SUBSEQUENT ENCOUNTER: ICD-10-CM

## 2024-09-03 DIAGNOSIS — M00.062 STAPHYLOCOCCAL ARTHRITIS OF LEFT KNEE (HCC): Primary | ICD-10-CM

## 2024-09-03 DIAGNOSIS — M00.062 STAPHYLOCOCCAL ARTHRITIS OF LEFT KNEE (HCC): ICD-10-CM

## 2024-09-03 PROCEDURE — 3078F DIAST BP <80 MM HG: CPT | Performed by: INTERNAL MEDICINE

## 2024-09-03 PROCEDURE — 73560 X-RAY EXAM OF KNEE 1 OR 2: CPT

## 2024-09-03 PROCEDURE — 1123F ACP DISCUSS/DSCN MKR DOCD: CPT | Performed by: INTERNAL MEDICINE

## 2024-09-03 PROCEDURE — 99213 OFFICE O/P EST LOW 20 MIN: CPT | Performed by: INTERNAL MEDICINE

## 2024-09-03 PROCEDURE — 3077F SYST BP >= 140 MM HG: CPT | Performed by: INTERNAL MEDICINE

## 2024-09-03 RX ORDER — NYSTATIN 100000 [USP'U]/G
1 POWDER TOPICAL 2 TIMES DAILY
COMMUNITY
Start: 2024-03-19

## 2024-09-03 RX ORDER — TORSEMIDE 20 MG/1
20 TABLET ORAL DAILY
COMMUNITY
Start: 2024-07-26

## 2024-09-03 RX ORDER — GINGER ROOT/GINGER ROOT EXT 262.5 MG
1 CAPSULE ORAL DAILY
COMMUNITY
Start: 2024-07-26

## 2024-09-03 RX ORDER — FOLIC ACID 1 MG/1
1 TABLET ORAL
COMMUNITY
Start: 2024-01-09

## 2024-09-03 RX ORDER — DOXYCYCLINE HYCLATE 100 MG
TABLET ORAL
COMMUNITY
Start: 2024-08-29

## 2024-09-03 RX ORDER — SODIUM BICARBONATE 325 MG/1
325 TABLET ORAL 2 TIMES DAILY
COMMUNITY
Start: 2024-07-12

## 2024-09-03 RX ORDER — LOPERAMIDE HCL 2 MG
2 CAPSULE ORAL 4 TIMES DAILY PRN
COMMUNITY
Start: 2024-03-19

## 2024-09-03 RX ORDER — PRAVASTATIN SODIUM 40 MG
40 TABLET ORAL NIGHTLY
COMMUNITY
Start: 2024-07-26

## 2024-09-03 NOTE — PROGRESS NOTES
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 Stop date 11-22-21)  Long term oral antibiotic suppression following the above with Keflex 500 mg po qid. Started on 11/23/21  S/p  explantation and antibiotic spacer device placement 10-26-21  Follow-up as needed    Medical Decision Making/Summary/Discussion:5/4/2023       Infection Control Recommendations   [place] Precautions    Chief complaint/reason for consultation:   Lt knee pyogenic arthritis    History of Present Illness:   Nicolasa Menezes is a 73 y.o.-year-old female who was initially admitted on (Not on file). Patient seen at the request of Dr. Tran    INITIAL HISTORY:    Patient with a Hx of prior Lt TKA  In 2007 at [facility] under Dr Vasquez, She subsequently experienced  Lt knee infections which required additional surgeries x 2.  She has underlying DM 2, Essential HTN, CKD,PITA and COPD.     Presented to [facility] because of acute onset of pain with ambulation. The knee joint was aspirated and grew Strep. Agalactiae. She was started on antibiotics (ceftriaxone) and was awaiting evaluation at tertiary care center because of her Hx of prior surgeries and infections.    [facility] refused transfer.  She presented to [facility] because of ongoing severe pain, erythema and inflammation.    Ortho has 
approximately 39.2 cm.       Left total knee arthroplasty is in place.  No acute fracture of the left   femur.  No evidence of hardware loosening.       Left leg: Distance from the medial femoral condyle to the mid tibial plafond   is 36 cm, which includes the polyethylene liner in the knee arthroplasty.   (Total leg length of approximately 75.2 cm from superior tip of greater   trochanter to mid tibial plafond).  The liner measures up to 2 cm in   thickness.       No acute fracture involving the tibia or fibula.  No periprosthetic lucency   in the proximal tibia.  Ankle mortise alignment is preserved.  Scattered   dystrophic calcifications in the leg.           Impression   1.  No acute osseous abnormality in the left femur or tibia/fibula.       2.  Left knee arthroplasty in place without evidence for hardware loosening.       3.  Leg length measurements provided above.  Please note limitations and   landmarks used.         CT extremity lower left with contrast    Result Date: 10/18/2021  History: Acute left lower leg and ankle redness Exam/Technique: Thin axial images through the left lower extremity were obtained from the superior aspect of the acetabulum to the left foot following the intravenous demonstration of 100 mL Omnipaque 300. Study was supplemented by sagittal and coronal reconstructed images. Comparison: None Findings: There is a left knee arthroplasty in place. Full evaluation of the area of the knee is compromised by extensive metallic artifact. There is a joint effusion of the left knee with fluid seen in the supra patellar bursa. There is no evidence for an acute osseous abnormality. No lytic or blastic processes are seen. No evidence of osteolysis demonstrated. No soft tissue masses or fluid collections are identified. IMPRESSION: 1. Left knee joint effusion with a left knee arthroplasty in place. 2. Otherwise normal CT of the left lower extremity. Workstation:[id number] Finalized by Simeon

## 2024-11-20 RX ORDER — CEPHALEXIN 500 MG/1
CAPSULE ORAL
Qty: 360 CAPSULE | Refills: 0 | Status: SHIPPED | OUTPATIENT
Start: 2024-11-20

## 2025-03-04 ENCOUNTER — OFFICE VISIT (OUTPATIENT)
Dept: INFECTIOUS DISEASES | Age: 75
End: 2025-03-04
Payer: MEDICARE

## 2025-03-04 VITALS
DIASTOLIC BLOOD PRESSURE: 74 MMHG | SYSTOLIC BLOOD PRESSURE: 139 MMHG | TEMPERATURE: 98.1 F | WEIGHT: 268 LBS | BODY MASS INDEX: 52.61 KG/M2 | HEIGHT: 60 IN | HEART RATE: 69 BPM

## 2025-03-04 DIAGNOSIS — T84.54XD INFECTION ASSOCIATED WITH INTERNAL LEFT KNEE PROSTHESIS, SUBSEQUENT ENCOUNTER: ICD-10-CM

## 2025-03-04 DIAGNOSIS — E11.9 TYPE 2 DIABETES MELLITUS WITHOUT COMPLICATION, WITHOUT LONG-TERM CURRENT USE OF INSULIN (HCC): ICD-10-CM

## 2025-03-04 DIAGNOSIS — M00.062 STAPHYLOCOCCAL ARTHRITIS OF LEFT KNEE (HCC): Primary | ICD-10-CM

## 2025-03-04 PROCEDURE — 1159F MED LIST DOCD IN RCRD: CPT | Performed by: INTERNAL MEDICINE

## 2025-03-04 PROCEDURE — 3075F SYST BP GE 130 - 139MM HG: CPT | Performed by: INTERNAL MEDICINE

## 2025-03-04 PROCEDURE — 3078F DIAST BP <80 MM HG: CPT | Performed by: INTERNAL MEDICINE

## 2025-03-04 PROCEDURE — 99213 OFFICE O/P EST LOW 20 MIN: CPT | Performed by: INTERNAL MEDICINE

## 2025-03-04 PROCEDURE — 1123F ACP DISCUSS/DSCN MKR DOCD: CPT | Performed by: INTERNAL MEDICINE

## 2025-03-04 RX ORDER — ALLOPURINOL 100 MG/1
100 TABLET ORAL DAILY
COMMUNITY

## 2025-03-04 RX ORDER — CEPHALEXIN 500 MG/1
500 CAPSULE ORAL 4 TIMES DAILY
Qty: 360 CAPSULE | Refills: 0 | Status: SHIPPED | OUTPATIENT
Start: 2025-03-04

## 2025-03-04 RX ORDER — EXEMESTANE 25 MG/1
25 TABLET ORAL DAILY
COMMUNITY

## 2025-03-04 RX ORDER — DAPAGLIFLOZIN 10 MG/1
10 TABLET, FILM COATED ORAL EVERY MORNING
COMMUNITY

## 2025-03-04 RX ORDER — DIPHENHYDRAMINE HCL 12.5MG/5ML
12.5 LIQUID (ML) ORAL 4 TIMES DAILY PRN
COMMUNITY

## 2025-03-04 RX ORDER — OLMESARTAN MEDOXOMIL AND HYDROCHLOROTHIAZIDE 40/25 40; 25 MG/1; MG/1
1 TABLET ORAL DAILY
COMMUNITY

## 2025-03-04 RX ORDER — VALACYCLOVIR HYDROCHLORIDE 1 G/1
1000 TABLET, FILM COATED ORAL 2 TIMES DAILY
COMMUNITY

## 2025-03-04 RX ORDER — ACETAMINOPHEN 325 MG/1
650 TABLET ORAL EVERY 4 HOURS PRN
COMMUNITY

## 2025-03-04 RX ORDER — FLUCONAZOLE 100 MG/1
100 TABLET ORAL DAILY
COMMUNITY

## 2025-03-04 NOTE — PROGRESS NOTES
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 Stop date 11-22-21)  Long term oral antibiotic suppression following the above with Keflex 500 mg po qid. Started on 11/23/21  S/p  explantation and antibiotic spacer device placement 10-26-21  Follow-up as needed    Medical Decision Making/Summary/Discussion:5/4/2023       Infection Control Recommendations   [place] Precautions    Chief complaint/reason for consultation:   Lt knee pyogenic arthritis    History of Present Illness:   Nioclasa Menezes is a 73 y.o.-year-old female who was initially admitted on (Not on file). Patient seen at the request of Dr. Tran    INITIAL HISTORY:    Patient with a Hx of prior Lt TKA  In 2007 at [facility] under Dr Vasquez, She subsequently experienced  Lt knee infections which required additional surgeries x 2.  She has underlying DM 2, Essential HTN, CKD,PITA and COPD.     Presented to [facility] because of acute onset of pain with ambulation. The knee joint was aspirated and grew Strep. Agalactiae. She was started on antibiotics (ceftriaxone) and was awaiting evaluation at tertiary care center because of her Hx of prior surgeries and infections.    [facility] refused transfer.  She presented to [facility] because of ongoing severe pain, erythema and inflammation.    Ortho has 
provided above.  Please note limitations and   landmarks used.         CT extremity lower left with contrast    Result Date: 10/18/2021  History: Acute left lower leg and ankle redness Exam/Technique: Thin axial images through the left lower extremity were obtained from the superior aspect of the acetabulum to the left foot following the intravenous demonstration of 100 mL Omnipaque 300. Study was supplemented by sagittal and coronal reconstructed images. Comparison: None Findings: There is a left knee arthroplasty in place. Full evaluation of the area of the knee is compromised by extensive metallic artifact. There is a joint effusion of the left knee with fluid seen in the supra patellar bursa. There is no evidence for an acute osseous abnormality. No lytic or blastic processes are seen. No evidence of osteolysis demonstrated. No soft tissue masses or fluid collections are identified. IMPRESSION: 1. Left knee joint effusion with a left knee arthroplasty in place. 2. Otherwise normal CT of the left lower extremity. Workstation:[id number] Finalized by Simeon Bazan MD on 10/18/2021     Medical Decision [id number]:             Thank you for allowing us to participate in the care of this patient. Please call with questions.    MD Loc Lynn participated in the evaluation of the patient under supervision.    ATTESTATION:    I have discussed the case, including pertinent history and exam findings with the medical resident or medical student. I have evaluated the  History, physical findings and pictures of the patient and the key elements of the encounter have been performed by me. I have reviewed the laboratory data, other diagnostic studies and discussed them with the medical resident or student. I have updated the medical record where necessary.    I agree with the assessment, plan and orders as documented by the medical resident or student and I have modified them as necessary.

## (undated) DEVICE — SUTURE PDS II SZ 0 L60IN ABSRB VLT L65MM TP-1 1/2 CIR Z991G

## (undated) DEVICE — GLOVE ORANGE PI 8   MSG9080

## (undated) DEVICE — STERILE PATIENT PROTECTIVE PAD FOR IMP® KNEE POSITIONERS & COHESIVE WRAP (10 / CASE): Brand: DE MAYO KNEE POSITIONER®

## (undated) DEVICE — OSCILLATING TIP SAW CARTRIDGE: Brand: PRECISION FALCON

## (undated) DEVICE — SUTURE PDS II 1 L36IN ABSRB VLT CTB-1 L36MM 1/2 CIR BLNT ZB347

## (undated) DEVICE — APPLICATOR MEDICATED 26 CC SOLUTION HI LT ORNG CHLORAPREP

## (undated) DEVICE — SYRINGE, LUER LOCK, 10ML: Brand: MEDLINE

## (undated) DEVICE — BRUSH TOOTH SURG SCRB FOR CANN CLN

## (undated) DEVICE — SUTURE VCRL SZ 1 L36IN ABSRB UD L48MM CTXB 1/2 CIR BLNT PNT JB977H

## (undated) DEVICE — ROD RMR L950MM DIA2.5MM W/ EXTN BALL TIP

## (undated) DEVICE — STOCKINETTE,IMPERVIOUS,12X48,STERILE: Brand: MEDLINE

## (undated) DEVICE — PACK PROCEDURE SURG SVMMC TOT KNEE

## (undated) DEVICE — PREVENA INCISION MANAGEMENT SYSTEM- PEEL & PLACE DRESSING: Brand: PREVENA™ PEEL & PLACE™

## (undated) DEVICE — SOLUTION IRRIG 3000ML 0.9% SOD CHL USP UROMATIC PLAS CONT

## (undated) DEVICE — TOTAL TRAY, 16FR 10ML SIL FOLEY, URN: Brand: MEDLINE

## (undated) DEVICE — TUBING, SUCTION, 9/32" X 20', STRAIGHT: Brand: MEDLINE INDUSTRIES, INC.

## (undated) DEVICE — GLOVE ORANGE PI 7 1/2   MSG9075

## (undated) DEVICE — YANKAUER,FLEXIBLE HANDLE,REGLR CAPACITY: Brand: MEDLINE INDUSTRIES, INC.

## (undated) DEVICE — HANDPIECE SET WITH COAXIAL HIGH FLOW TIP AND SUCTION TUBE: Brand: INTERPULSE

## (undated) DEVICE — THIN OFFSET (9.0 X 0.38 X 25.0MM)

## (undated) DEVICE — KIT NEG PRSS FOR NONLIN OR UPTO 90CM LIN INCIS PREVENA +

## (undated) DEVICE — 3M™ IOBAN™ 2 ANTIMICROBIAL INCISE DRAPE 6650EZ: Brand: IOBAN™ 2

## (undated) DEVICE — COVER XR CASS W24XL25IN CLR POLY FLM DRAPEABLE W REL LNR

## (undated) DEVICE — GLOVE SURG SZ 6 THK91MIL LTX FREE SYN POLYISOPRENE ANTI

## (undated) DEVICE — GOWN,AURORA,NONREINFORCED,LARGE: Brand: MEDLINE

## (undated) DEVICE — ZIPPERED TOGA, 2X LARGE: Brand: FLYTE

## (undated) DEVICE — Z DISCONTINUED USE 2272124 DRAPE SURG XL N INVASIVE 2 LAYR DISP

## (undated) DEVICE — WAX SURG 2.5GM HEMSTAT BNE BEESWAX PARAFFIN ISO PALMITATE

## (undated) DEVICE — NEPTUNE E-SEP SMOKE EVACUATION PENCIL, COATED, 70MM BLADE, PUSH BUTTON SWITCH: Brand: NEPTUNE E-SEP

## (undated) DEVICE — DRAPE,U/ SHT,SPLIT,PLAS,STERIL: Brand: MEDLINE

## (undated) DEVICE — BLADE, TONGUE, 6", STERILE: Brand: MEDLINE

## (undated) DEVICE — BIPOLAR SEALER 23-112-1 AQM 6.0: Brand: AQUAMANTYS ®

## (undated) DEVICE — GLOVE ORANGE PI 8 1/2   MSG9085

## (undated) DEVICE — CONTAINER,SPECIMEN,4OZ,OR STRL: Brand: MEDLINE

## (undated) DEVICE — CEMENT MIXING SYSTEM WITH FEMORAL BREAKWAY NOZZLE: Brand: REVOLUTION